# Patient Record
Sex: FEMALE | Race: WHITE | NOT HISPANIC OR LATINO | Employment: OTHER | URBAN - METROPOLITAN AREA
[De-identification: names, ages, dates, MRNs, and addresses within clinical notes are randomized per-mention and may not be internally consistent; named-entity substitution may affect disease eponyms.]

---

## 2017-01-10 ENCOUNTER — TRANSCRIBE ORDERS (OUTPATIENT)
Dept: ADMINISTRATIVE | Facility: HOSPITAL | Age: 62
End: 2017-01-10

## 2017-01-12 ENCOUNTER — TRANSCRIBE ORDERS (OUTPATIENT)
Dept: ADMINISTRATIVE | Facility: HOSPITAL | Age: 62
End: 2017-01-12

## 2017-01-12 ENCOUNTER — APPOINTMENT (OUTPATIENT)
Dept: LAB | Facility: MEDICAL CENTER | Age: 62
End: 2017-01-12
Payer: COMMERCIAL

## 2017-01-12 DIAGNOSIS — E78.49 OTHER HYPERLIPIDEMIA: Primary | ICD-10-CM

## 2017-01-12 DIAGNOSIS — E78.49 OTHER HYPERLIPIDEMIA: ICD-10-CM

## 2017-01-12 LAB
CHOLEST SERPL-MCNC: 198 MG/DL (ref 50–200)
HDLC SERPL-MCNC: 50 MG/DL (ref 40–60)
LDLC SERPL CALC-MCNC: 130 MG/DL (ref 0–100)
TRIGL SERPL-MCNC: 92 MG/DL

## 2017-01-12 PROCEDURE — 36415 COLL VENOUS BLD VENIPUNCTURE: CPT

## 2017-01-12 PROCEDURE — 80061 LIPID PANEL: CPT

## 2017-03-28 ENCOUNTER — APPOINTMENT (OUTPATIENT)
Dept: LAB | Facility: MEDICAL CENTER | Age: 62
End: 2017-03-28
Payer: COMMERCIAL

## 2017-03-28 DIAGNOSIS — E03.9 HYPOTHYROIDISM: ICD-10-CM

## 2017-03-28 DIAGNOSIS — C50.912 MALIGNANT NEOPLASM OF LEFT FEMALE BREAST (HCC): ICD-10-CM

## 2017-03-28 LAB
ALBUMIN SERPL BCP-MCNC: 3.9 G/DL (ref 3.5–5)
ALP SERPL-CCNC: 100 U/L (ref 46–116)
ALT SERPL W P-5'-P-CCNC: 28 U/L (ref 12–78)
ANION GAP SERPL CALCULATED.3IONS-SCNC: 5 MMOL/L (ref 4–13)
AST SERPL W P-5'-P-CCNC: 19 U/L (ref 5–45)
BASOPHILS # BLD AUTO: 0.06 THOUSANDS/ΜL (ref 0–0.1)
BASOPHILS NFR BLD AUTO: 1 % (ref 0–1)
BILIRUB SERPL-MCNC: 0.45 MG/DL (ref 0.2–1)
BUN SERPL-MCNC: 15 MG/DL (ref 5–25)
CALCIUM SERPL-MCNC: 9.1 MG/DL (ref 8.3–10.1)
CANCER AG27-29 SERPL-ACNC: 11.1 U/ML (ref 0–42.3)
CHLORIDE SERPL-SCNC: 103 MMOL/L (ref 100–108)
CO2 SERPL-SCNC: 29 MMOL/L (ref 21–32)
CREAT SERPL-MCNC: 0.81 MG/DL (ref 0.6–1.3)
EOSINOPHIL # BLD AUTO: 0.25 THOUSAND/ΜL (ref 0–0.61)
EOSINOPHIL NFR BLD AUTO: 2 % (ref 0–6)
ERYTHROCYTE [DISTWIDTH] IN BLOOD BY AUTOMATED COUNT: 14.7 % (ref 11.6–15.1)
GFR SERPL CREATININE-BSD FRML MDRD: >60 ML/MIN/1.73SQ M
GLUCOSE P FAST SERPL-MCNC: 93 MG/DL (ref 65–99)
HCT VFR BLD AUTO: 41.1 % (ref 34.8–46.1)
HGB BLD-MCNC: 13.6 G/DL (ref 11.5–15.4)
LYMPHOCYTES # BLD AUTO: 3.67 THOUSANDS/ΜL (ref 0.6–4.47)
LYMPHOCYTES NFR BLD AUTO: 36 % (ref 14–44)
MCH RBC QN AUTO: 31.6 PG (ref 26.8–34.3)
MCHC RBC AUTO-ENTMCNC: 33.1 G/DL (ref 31.4–37.4)
MCV RBC AUTO: 95 FL (ref 82–98)
MONOCYTES # BLD AUTO: 1.11 THOUSAND/ΜL (ref 0.17–1.22)
MONOCYTES NFR BLD AUTO: 11 % (ref 4–12)
NEUTROPHILS # BLD AUTO: 5.19 THOUSANDS/ΜL (ref 1.85–7.62)
NEUTS SEG NFR BLD AUTO: 50 % (ref 43–75)
NRBC BLD AUTO-RTO: 0 /100 WBCS
PLATELET # BLD AUTO: 393 THOUSANDS/UL (ref 149–390)
PMV BLD AUTO: 10.7 FL (ref 8.9–12.7)
POTASSIUM SERPL-SCNC: 4.7 MMOL/L (ref 3.5–5.3)
PROT SERPL-MCNC: 7.4 G/DL (ref 6.4–8.2)
RBC # BLD AUTO: 4.31 MILLION/UL (ref 3.81–5.12)
SODIUM SERPL-SCNC: 137 MMOL/L (ref 136–145)
WBC # BLD AUTO: 10.33 THOUSAND/UL (ref 4.31–10.16)

## 2017-03-28 PROCEDURE — 85025 COMPLETE CBC W/AUTO DIFF WBC: CPT

## 2017-03-28 PROCEDURE — 80053 COMPREHEN METABOLIC PANEL: CPT

## 2017-03-28 PROCEDURE — 36415 COLL VENOUS BLD VENIPUNCTURE: CPT

## 2017-03-28 PROCEDURE — 86300 IMMUNOASSAY TUMOR CA 15-3: CPT

## 2017-04-05 ENCOUNTER — ALLSCRIPTS OFFICE VISIT (OUTPATIENT)
Dept: OTHER | Facility: OTHER | Age: 62
End: 2017-04-05

## 2017-04-13 ENCOUNTER — ALLSCRIPTS OFFICE VISIT (OUTPATIENT)
Dept: OTHER | Facility: OTHER | Age: 62
End: 2017-04-13

## 2017-04-18 ENCOUNTER — LAB CONVERSION - ENCOUNTER (OUTPATIENT)
Dept: OTHER | Facility: OTHER | Age: 62
End: 2017-04-18

## 2017-04-18 LAB
ADDITIONAL INFORMATION (HISTORICAL): NORMAL
ADEQUACY: (HISTORICAL): NORMAL
COMMENT (HISTORICAL): NORMAL
CYTOTECHNOLOGIST: (HISTORICAL): NORMAL
INTERPRETATION (HISTORICAL): NORMAL
LMP (HISTORICAL): NORMAL
PREV. BX: (HISTORICAL): NORMAL
PREV. PAP (HISTORICAL): NORMAL
SOURCE (HISTORICAL): NORMAL

## 2017-07-20 ENCOUNTER — APPOINTMENT (OUTPATIENT)
Dept: LAB | Facility: MEDICAL CENTER | Age: 62
End: 2017-07-20
Payer: COMMERCIAL

## 2017-07-20 ENCOUNTER — TRANSCRIBE ORDERS (OUTPATIENT)
Dept: ADMINISTRATIVE | Facility: HOSPITAL | Age: 62
End: 2017-07-20

## 2017-07-20 DIAGNOSIS — I10 ESSENTIAL HYPERTENSION, MALIGNANT: ICD-10-CM

## 2017-07-20 DIAGNOSIS — E03.9 UNSPECIFIED HYPOTHYROIDISM: ICD-10-CM

## 2017-07-20 DIAGNOSIS — E78.5 OTHER AND UNSPECIFIED HYPERLIPIDEMIA: ICD-10-CM

## 2017-07-20 DIAGNOSIS — I10 ESSENTIAL HYPERTENSION, MALIGNANT: Primary | ICD-10-CM

## 2017-07-20 LAB
ALBUMIN SERPL BCP-MCNC: 4 G/DL (ref 3.5–5)
ALP SERPL-CCNC: 92 U/L (ref 46–116)
ALT SERPL W P-5'-P-CCNC: 29 U/L (ref 12–78)
ANION GAP SERPL CALCULATED.3IONS-SCNC: 6 MMOL/L (ref 4–13)
AST SERPL W P-5'-P-CCNC: 20 U/L (ref 5–45)
BASOPHILS # BLD AUTO: 0.06 THOUSANDS/ΜL (ref 0–0.1)
BASOPHILS NFR BLD AUTO: 1 % (ref 0–1)
BILIRUB SERPL-MCNC: 0.44 MG/DL (ref 0.2–1)
BUN SERPL-MCNC: 17 MG/DL (ref 5–25)
CALCIUM SERPL-MCNC: 9.2 MG/DL (ref 8.3–10.1)
CHLORIDE SERPL-SCNC: 103 MMOL/L (ref 100–108)
CHOLEST SERPL-MCNC: 202 MG/DL (ref 50–200)
CO2 SERPL-SCNC: 29 MMOL/L (ref 21–32)
CREAT SERPL-MCNC: 0.82 MG/DL (ref 0.6–1.3)
EOSINOPHIL # BLD AUTO: 0.34 THOUSAND/ΜL (ref 0–0.61)
EOSINOPHIL NFR BLD AUTO: 4 % (ref 0–6)
ERYTHROCYTE [DISTWIDTH] IN BLOOD BY AUTOMATED COUNT: 14.4 % (ref 11.6–15.1)
GFR SERPL CREATININE-BSD FRML MDRD: >60 ML/MIN/1.73SQ M
GLUCOSE P FAST SERPL-MCNC: 90 MG/DL (ref 65–99)
HCT VFR BLD AUTO: 42.9 % (ref 34.8–46.1)
HDLC SERPL-MCNC: 35 MG/DL (ref 40–60)
HGB BLD-MCNC: 14 G/DL (ref 11.5–15.4)
LDLC SERPL CALC-MCNC: 135 MG/DL (ref 0–100)
LYMPHOCYTES # BLD AUTO: 3.82 THOUSANDS/ΜL (ref 0.6–4.47)
LYMPHOCYTES NFR BLD AUTO: 40 % (ref 14–44)
MCH RBC QN AUTO: 31.3 PG (ref 26.8–34.3)
MCHC RBC AUTO-ENTMCNC: 32.6 G/DL (ref 31.4–37.4)
MCV RBC AUTO: 96 FL (ref 82–98)
MONOCYTES # BLD AUTO: 0.9 THOUSAND/ΜL (ref 0.17–1.22)
MONOCYTES NFR BLD AUTO: 9 % (ref 4–12)
NEUTROPHILS # BLD AUTO: 4.46 THOUSANDS/ΜL (ref 1.85–7.62)
NEUTS SEG NFR BLD AUTO: 46 % (ref 43–75)
NRBC BLD AUTO-RTO: 0 /100 WBCS
PLATELET # BLD AUTO: 426 THOUSANDS/UL (ref 149–390)
PMV BLD AUTO: 10.6 FL (ref 8.9–12.7)
POTASSIUM SERPL-SCNC: 4.1 MMOL/L (ref 3.5–5.3)
PROT SERPL-MCNC: 7.5 G/DL (ref 6.4–8.2)
RBC # BLD AUTO: 4.47 MILLION/UL (ref 3.81–5.12)
SODIUM SERPL-SCNC: 138 MMOL/L (ref 136–145)
T4 FREE SERPL-MCNC: 1.36 NG/DL (ref 0.76–1.46)
TRIGL SERPL-MCNC: 159 MG/DL
TSH SERPL DL<=0.05 MIU/L-ACNC: 1.66 UIU/ML (ref 0.36–3.74)
WBC # BLD AUTO: 9.61 THOUSAND/UL (ref 4.31–10.16)

## 2017-07-20 PROCEDURE — 80061 LIPID PANEL: CPT

## 2017-07-20 PROCEDURE — 36415 COLL VENOUS BLD VENIPUNCTURE: CPT

## 2017-07-20 PROCEDURE — 84443 ASSAY THYROID STIM HORMONE: CPT

## 2017-07-20 PROCEDURE — 84439 ASSAY OF FREE THYROXINE: CPT

## 2017-07-20 PROCEDURE — 80053 COMPREHEN METABOLIC PANEL: CPT

## 2017-07-20 PROCEDURE — 85025 COMPLETE CBC W/AUTO DIFF WBC: CPT

## 2017-09-25 DIAGNOSIS — C50.912 MALIGNANT NEOPLASM OF LEFT FEMALE BREAST (HCC): ICD-10-CM

## 2017-09-26 ENCOUNTER — TRANSCRIBE ORDERS (OUTPATIENT)
Dept: ADMINISTRATIVE | Facility: HOSPITAL | Age: 62
End: 2017-09-26

## 2017-09-26 ENCOUNTER — APPOINTMENT (OUTPATIENT)
Dept: LAB | Facility: MEDICAL CENTER | Age: 62
End: 2017-09-26
Payer: COMMERCIAL

## 2017-09-26 DIAGNOSIS — C50.912 MALIGNANT NEOPLASM OF LEFT FEMALE BREAST (HCC): ICD-10-CM

## 2017-09-26 LAB
ALBUMIN SERPL BCP-MCNC: 3.8 G/DL (ref 3.5–5)
ALP SERPL-CCNC: 91 U/L (ref 46–116)
ALT SERPL W P-5'-P-CCNC: 30 U/L (ref 12–78)
ANION GAP SERPL CALCULATED.3IONS-SCNC: 7 MMOL/L (ref 4–13)
AST SERPL W P-5'-P-CCNC: 20 U/L (ref 5–45)
BASOPHILS # BLD AUTO: 0.04 THOUSANDS/ΜL (ref 0–0.1)
BASOPHILS NFR BLD AUTO: 0 % (ref 0–1)
BILIRUB SERPL-MCNC: 0.52 MG/DL (ref 0.2–1)
BUN SERPL-MCNC: 16 MG/DL (ref 5–25)
CALCIUM SERPL-MCNC: 9.4 MG/DL (ref 8.3–10.1)
CANCER AG27-29 SERPL-ACNC: 11.4 U/ML (ref 0–42.3)
CHLORIDE SERPL-SCNC: 103 MMOL/L (ref 100–108)
CO2 SERPL-SCNC: 28 MMOL/L (ref 21–32)
CREAT SERPL-MCNC: 0.81 MG/DL (ref 0.6–1.3)
EOSINOPHIL # BLD AUTO: 0.23 THOUSAND/ΜL (ref 0–0.61)
EOSINOPHIL NFR BLD AUTO: 2 % (ref 0–6)
ERYTHROCYTE [DISTWIDTH] IN BLOOD BY AUTOMATED COUNT: 14.6 % (ref 11.6–15.1)
GFR SERPL CREATININE-BSD FRML MDRD: 79 ML/MIN/1.73SQ M
GLUCOSE P FAST SERPL-MCNC: 116 MG/DL (ref 65–99)
HCT VFR BLD AUTO: 41.2 % (ref 34.8–46.1)
HGB BLD-MCNC: 13.8 G/DL (ref 11.5–15.4)
LYMPHOCYTES # BLD AUTO: 3.19 THOUSANDS/ΜL (ref 0.6–4.47)
LYMPHOCYTES NFR BLD AUTO: 32 % (ref 14–44)
MCH RBC QN AUTO: 31.7 PG (ref 26.8–34.3)
MCHC RBC AUTO-ENTMCNC: 33.5 G/DL (ref 31.4–37.4)
MCV RBC AUTO: 95 FL (ref 82–98)
MONOCYTES # BLD AUTO: 0.92 THOUSAND/ΜL (ref 0.17–1.22)
MONOCYTES NFR BLD AUTO: 9 % (ref 4–12)
NEUTROPHILS # BLD AUTO: 5.66 THOUSANDS/ΜL (ref 1.85–7.62)
NEUTS SEG NFR BLD AUTO: 57 % (ref 43–75)
NRBC BLD AUTO-RTO: 0 /100 WBCS
PLATELET # BLD AUTO: 408 THOUSANDS/UL (ref 149–390)
PMV BLD AUTO: 10.8 FL (ref 8.9–12.7)
POTASSIUM SERPL-SCNC: 4.6 MMOL/L (ref 3.5–5.3)
PROT SERPL-MCNC: 7.7 G/DL (ref 6.4–8.2)
RBC # BLD AUTO: 4.36 MILLION/UL (ref 3.81–5.12)
SODIUM SERPL-SCNC: 138 MMOL/L (ref 136–145)
WBC # BLD AUTO: 10.06 THOUSAND/UL (ref 4.31–10.16)

## 2017-09-26 PROCEDURE — 85025 COMPLETE CBC W/AUTO DIFF WBC: CPT

## 2017-09-26 PROCEDURE — 86300 IMMUNOASSAY TUMOR CA 15-3: CPT

## 2017-09-26 PROCEDURE — 80053 COMPREHEN METABOLIC PANEL: CPT

## 2017-09-26 PROCEDURE — 36415 COLL VENOUS BLD VENIPUNCTURE: CPT

## 2017-10-04 ENCOUNTER — ALLSCRIPTS OFFICE VISIT (OUTPATIENT)
Dept: OTHER | Facility: OTHER | Age: 62
End: 2017-10-04

## 2017-10-06 NOTE — PROGRESS NOTES
Assessment  1  Breast cancer, left breast (174 9) (C50 912)    Plan  Breast cancer, left breast    · (1) CA 27 29; Status:Active; Requested for:26Mar2018; Perform:[facility]; IVT:[id number]; For:Breast cancer, left breast; Ordered By:Russell Camacho;   · (1) CBC/PLT/DIFF; Status:Active; Requested for:26Mar2018; Perform:[facility]; FRO:[id number]; For:Breast cancer, left breast; Ordered By:Russell Camacho;   · (1) COMPREHENSIVE METABOLIC PANEL; Status:Active; Requested for:26Mar2018; Perform:[facility]; LYR:[id number]; For:Breast cancer, left breast; Ordered By:Horacio Camacho;   · Follow-up visit in 6 months Evaluation and Treatment  Follow-up  Status: Complete   Done: [id number] 08:40AM   Ordered; For: Breast cancer, left breast; Ordered By: Eh Tamayo Performed:  Due: 08Apr2018; Last Updated By: Valeri Moeller; 10/4/2017 9:31:16 AM    Discussion/Summary  Discussion Summary:   Kris Davis remains in clinical remission of infiltrating ductal carcinoma of left breast, pTicpN0, hormone receptor negative, HER-2/alexia negative, diagnosed November 2013  patient aware to seek medical attention for lymph node enlargement, progressive joint pain of the knees or elsewhere, excessive fatigue, or if other new problems arise  Otherwise, I plan to see her again in 6 months  Chief Complaint  Chief Complaint Free Text Note Form: Kris Davis was seen in followup today in regards to infiltrating ductal carcinoma of left breast, pTic pN0, hormone receptor negative, HER-2/alexia negative, diagnosed November 2013  History of Present Illness  HPI: She has been feeling well  Previous Therapy:   1   Infiltrating ductal carcinoma, grade 3, 1 2 cm, ER negative, CT negative, HER-2/alexia negative and 0 7 cm, ER negative, CT negative, HER-2 negative, 3 sentinel lymph nodes with no metastatic tumor identified, pT1c,pN0, left total mastectomy and sentinel lymph node sampling  Dose dense Adriamycin and Cytoxan for 4 courses and then dose dense Taxol four courses from 2014 to 2014  Left breast implant reconstruction 2014 and again in 2014 and then TRAM flap reconstruction 2015  Leukocytosis with thrombocytosis was diagnosed in   Bone marrow examination in 1999 was minimally hypercellular with increased megakaryocytes and there was no JAK2 V617F mutation noted in 2008 at which time flow cytometry on peripheral blood demonstrated presence of granulocytes, lymphocytes, and monocytes without diagnostic changes of leukemia or lymphoma  Interval History: medical history: Hypothyroidism since age 28, arthritis of the knees bilaterally left greater than right, uterine fibroid was removed approximately 15 years ago, wisdom teeth extractions in her early 35s and tubal ligation  Past medical history: Leukocytosis with thrombocytosis was diagnosed in   Colonoscopy was done in 2010 and was unremarkable  history: Father  at age 59 with multiple myeloma  Mother  at age 76 with myocardial infarction  A brother and a sister are in good health and one other sister age 54 has diabetes mellitus and has undergone CABG  Both of her two daughters are in good health  history: She is  and lives with her   She was a cigarette smoker of a half-a-pack per day for 10 years in her 25s  She has 1-2 glasses of wine daily  She works in a bank  She gets regular exercise walking on the treadmill and walks outdoors almost daily and exercises with light weights daily  Review of Systems  Complete-Female:   Constitutional: She has been feeling well  ENT: no nosebleeds  Cardiovascular: no chest pain-and-no lower extremity edema  Respiratory: no shortness of breath-and-no cough  Gastrointestinal: no abdominal pain,-no constipation-and-no diarrhea     Musculoskeletal: There is chronic arthritis of the knees, but-as noted in HPI  Neurological: no headache-and-no difficulty walking  Psychiatric: no emotional problems  Hematologic/Lymphatic: no tendency for easy bruising  Active Problems  1  Abnormal findings on diagnostic imaging of breast (793 89) (R92 8)   2  Alopecia totalis (704 09) (L63 0)   3  Arthritis of both knees (716 96) (M17 0)   4  Breast cancer, left breast (174 9) (C50 912)   5  Carcinoma in situ of breast (233 0) (D05 90)   6  Elevated alkaline phosphatase level (790 5) (R74 8)   7  Encounter for routine gynecological examination (V72 31) (Z01 419)   8  Encounter for routine gynecological examination with Papanicolaou smear of cervix   (V7 31,V76 2) (Z01 419)   9  Encounter for screening mammogram for malignant neoplasm of breast (V76 12)   (Z12 31)   10  Herpes zoster (053 9) (B02 9)   11  History of allergy (V15 09) (Z88 9)   12  Hypothyroidism (244 9) (E03 9)   13  Lymphadenopathy, mediastinal (785 6) (R59 0)   14  Malignant neoplasm of breast, unspecified laterality   15  Menopausal symptoms (627 2) (N95 1)   16  Menopause (627 2)    Past Medical History  1  History of Breast Cancer (V10 3)   2  History of Summary Of Previous Pregnancies  2  (Total No )   3  History of Summary Of Previous Pregnancies Para 2  (Deliveries)    Surgical History  1  History of Breast Surgery Reconstruction   2  History of Radical Mastectomy Left Breast   3  History of Tubal Ligation    Family History  Father    1  Family history of Diabetes Mellitus (V18 0)   2  Family history of Hypertension (V17 49)  Sister    3  Family history of Diabetes Mellitus (V18 0)  Family History    4  Family history of Acute Myocardial Infarction (V17 3)   5  Family history of Bone Cancer   6  Family history of Hypothyroidism    Social History   · Former smoker (V15 82) (W30 170)   · Marital History - Currently    · Never A Smoker   · Sexually Active    Current Meds   1  Calcium TABS;    Therapy: (Recorded:2014) to Recorded   2  [employer] Fish Oil CAPS Recorded   3  Lisinopril 20 MG Oral Tablet Recorded   4  Multi-Day Vitamins TABS; Therapy: (Recorded:18Ykt8317) to Recorded   5  Synthroid 125 MCG Oral Tablet Recorded   6  Vitamin D CAPS; Therapy: (Recorded:04Apr2014) to Recorded   7  ZyrTEC Allergy 10 MG Oral Tablet; Therapy: (Recorded:01Yag4145) to Recorded    Allergies  1  Clindamycin HCl CAPS   2  Penicillins   3  Sulfa Drugs  4  Adhesive Tape    Vitals  Vital Signs    Recorded: [id number] 09:01AM   Temperature 97 4 F   Heart Rate 78   Respiration 16   Systolic 434   Diastolic 70   Height 5 ft 2 3 in   Weight 199 lb    BMI Calculated 36 05   BSA Calculated 1 91   O2 Saturation 97   Pain Scale 0     Physical Exam    Constitutional She appears well  Eyes EOMI  Ears, Nose, Mouth, and Throat Oropharynx is clear  Pulmonary   Auscultation of lungs: Clear to auscultation  Cardiovascular Heart has regular rate and rhythm -No lower extremity edema  Abdomen   Abdomen: Non-tender, no masses  Liver and spleen: No hepatomegaly or splenomegaly  Lymphatic   Palpation of lymph nodes in neck: No lymphadenopathy  -No axillary or inguinal lymphadenopathy  Musculoskeletal   Gait and station: Normal     Neurologic Neurological is grossly intact  Psychiatric   Orientation to person, place, and time: Normal     Mood and affect: Normal     Additional Exam:  No right breast mass, no recurrent tumor over the left chest reconstruction site  ECOG 0       Results/Data  (1) CBC/PLT/DIFF [id number] 09:10AM Jacquelyn Soler Order Number: [id number]     Test Name Result Flag Reference   WBC COUNT 10 06 Thousand/uL  4 31-10 16   RBC COUNT 4 36 Million/uL  3 81-5 12   HEMOGLOBIN 13 8 g/dL  11 5-15 4   HEMATOCRIT 41 2 %  34 8-46  1   MCV 95 fL  82-98   MCH 31 7 pg  26 8-34 3   MCHC 33 5 g/dL  31 4-37 4   RDW 14 6 %  11 6-15 1   MPV 10 8 fL  8 9-12 7   PLATELET COUNT 818 Thousands/uL H 149-390   nRBC AUTOMATED 0 /100 WBCs     NEUTROPHILS RELATIVE PERCENT 57 %  43-75   LYMPHOCYTES RELATIVE PERCENT 32 %  14-44   MONOCYTES RELATIVE PERCENT 9 %  4-12   EOSINOPHILS RELATIVE PERCENT 2 %  0-6   BASOPHILS RELATIVE PERCENT 0 %  0-1   NEUTROPHILS ABSOLUTE COUNT 5 66 Thousands/? ??L  1 85-7 62   LYMPHOCYTES ABSOLUTE COUNT 3 19 Thousands/? ??L  0 60-4 47   MONOCYTES ABSOLUTE COUNT 0 92 Thousand/? ??L  0 17-1 22   EOSINOPHILS ABSOLUTE COUNT 0 23 Thousand/? ??L  0 00-0 61   BASOPHILS ABSOLUTE COUNT 0 04 Thousands/? ??L  0 00-0 10   - Patient Instructions: This bloodwork is non-fasting  Please drink two glasses of water morning of bloodwork  (1) COMPREHENSIVE METABOLIC PANEL 14YUD1038 75:16OS Stacie Romo Order Number: [id number]     Test Name Result Flag Reference   SODIUM 138 mmol/L  136-145   POTASSIUM 4 6 mmol/L  3 5-5 3   CHLORIDE 103 mmol/L  100-108   CARBON DIOXIDE 28 mmol/L  21-32   ANION GAP (CALC) 7 mmol/L  4-13   BLOOD UREA NITROGEN 16 mg/dL  5-25   CREATININE 0 81 mg/dL  0 60-1 30   Standardized to IDMS reference method   CALCIUM 9 4 mg/dL  8 3-10 1   BILI, TOTAL 0 52 mg/dL  0 20-1 00   ALK PHOSPHATAS 91 U/L     ALT (SGPT) 30 U/L  12-78   Specimen collection should occur prior to Sulfasalazine and/or Sulfapyridine administration due to the potential for falsely depressed results  AST(SGOT) 20 U/L  5-45   Specimen collection should occur prior to Sulfasalazine administration due to the potential for falsely depressed results  ALBUMIN 3 8 g/dL  3 5-5 0   TOTAL PROTEIN 7 7 g/dL  6 4-8 2   eGFR 79 ml/min/1 73sq m     [facility] [employer] Disease Education Program recommendations are as follows:  GFR calculation is accurate only with a steady state creatinine  Chronic Kidney disease less than 60 ml/min/1 73 sq  meters  Kidney failure less than 15 ml/min/1 73 sq  meters     GLUCOSE FASTING 116 mg/dL H 65-99   Specimen collection should occur prior to Sulfasalazine administration due to the potential for falsely depressed results  Specimen collection should occur prior to Sulfapyridine administration due to the potential for falsely elevated results  (1) CA 27 29 [id number] 09:10AM Johanna Comber Order Number: [id number]     Test Name Result Flag Reference   CA 27 29(NEW) 11 4 U/mL  0 0-42 3     Results Form:   Results   Radiology Bone density of the heel from April 13, 2017: +0 3 RF  Lab Results From September 26, 2016: CA 27 29 is 20 4,March 28, 2017: CA 27 29 is11  1  September 26, 2017: CA 27 29 is 11 4, WBC 10 06, hemoglobin 13 8, platelets 508, creatinine 0 81, bili 0 52, AST 20, ALT 30, alk-phos 91  Future Appointments    Date/Time Provider Specialty Site   04/03/2018 08:40 AM AMINA Benitez   Hematology Oncology [facility]     Signatures   Electronically signed by : AMINA Mathias ; Oct  5 2017  9:48PM EST                       (Author)

## 2018-01-13 VITALS
HEIGHT: 62 IN | SYSTOLIC BLOOD PRESSURE: 102 MMHG | RESPIRATION RATE: 16 BRPM | BODY MASS INDEX: 36.62 KG/M2 | OXYGEN SATURATION: 97 % | WEIGHT: 199 LBS | TEMPERATURE: 97.4 F | DIASTOLIC BLOOD PRESSURE: 70 MMHG | HEART RATE: 78 BPM

## 2018-01-13 VITALS
WEIGHT: 200 LBS | HEART RATE: 78 BPM | RESPIRATION RATE: 16 BRPM | HEIGHT: 62 IN | BODY MASS INDEX: 36.8 KG/M2 | TEMPERATURE: 98.2 F | SYSTOLIC BLOOD PRESSURE: 150 MMHG | OXYGEN SATURATION: 97 % | DIASTOLIC BLOOD PRESSURE: 84 MMHG

## 2018-01-15 VITALS
HEIGHT: 62 IN | DIASTOLIC BLOOD PRESSURE: 94 MMHG | SYSTOLIC BLOOD PRESSURE: 144 MMHG | BODY MASS INDEX: 36.8 KG/M2 | WEIGHT: 200 LBS

## 2018-03-26 ENCOUNTER — TRANSCRIBE ORDERS (OUTPATIENT)
Dept: ADMINISTRATIVE | Facility: HOSPITAL | Age: 63
End: 2018-03-26

## 2018-03-26 ENCOUNTER — APPOINTMENT (OUTPATIENT)
Dept: LAB | Facility: MEDICAL CENTER | Age: 63
End: 2018-03-26
Payer: COMMERCIAL

## 2018-03-26 DIAGNOSIS — C50.912 MALIGNANT NEOPLASM OF LEFT FEMALE BREAST (HCC): ICD-10-CM

## 2018-03-26 LAB
ALBUMIN SERPL BCP-MCNC: 4.4 G/DL (ref 3.5–5)
ALP SERPL-CCNC: 106 U/L (ref 46–116)
ALT SERPL W P-5'-P-CCNC: 28 U/L (ref 12–78)
ANION GAP SERPL CALCULATED.3IONS-SCNC: 5 MMOL/L (ref 4–13)
AST SERPL W P-5'-P-CCNC: 18 U/L (ref 5–45)
BASOPHILS # BLD AUTO: 0.05 THOUSANDS/ΜL (ref 0–0.1)
BASOPHILS NFR BLD AUTO: 1 % (ref 0–1)
BILIRUB SERPL-MCNC: 0.65 MG/DL (ref 0.2–1)
BUN SERPL-MCNC: 15 MG/DL (ref 5–25)
CALCIUM SERPL-MCNC: 9.5 MG/DL (ref 8.3–10.1)
CANCER AG27-29 SERPL-ACNC: 14.9 U/ML (ref 0–42.3)
CHLORIDE SERPL-SCNC: 102 MMOL/L (ref 100–108)
CO2 SERPL-SCNC: 29 MMOL/L (ref 21–32)
CREAT SERPL-MCNC: 0.93 MG/DL (ref 0.6–1.3)
EOSINOPHIL # BLD AUTO: 0.33 THOUSAND/ΜL (ref 0–0.61)
EOSINOPHIL NFR BLD AUTO: 3 % (ref 0–6)
ERYTHROCYTE [DISTWIDTH] IN BLOOD BY AUTOMATED COUNT: 14 % (ref 11.6–15.1)
GFR SERPL CREATININE-BSD FRML MDRD: 67 ML/MIN/1.73SQ M
GLUCOSE P FAST SERPL-MCNC: 93 MG/DL (ref 65–99)
HCT VFR BLD AUTO: 42.4 % (ref 34.8–46.1)
HGB BLD-MCNC: 14.1 G/DL (ref 11.5–15.4)
LYMPHOCYTES # BLD AUTO: 3.77 THOUSANDS/ΜL (ref 0.6–4.47)
LYMPHOCYTES NFR BLD AUTO: 37 % (ref 14–44)
MCH RBC QN AUTO: 31.7 PG (ref 26.8–34.3)
MCHC RBC AUTO-ENTMCNC: 33.3 G/DL (ref 31.4–37.4)
MCV RBC AUTO: 95 FL (ref 82–98)
MONOCYTES # BLD AUTO: 1.01 THOUSAND/ΜL (ref 0.17–1.22)
MONOCYTES NFR BLD AUTO: 10 % (ref 4–12)
NEUTROPHILS # BLD AUTO: 5.03 THOUSANDS/ΜL (ref 1.85–7.62)
NEUTS SEG NFR BLD AUTO: 49 % (ref 43–75)
NRBC BLD AUTO-RTO: 0 /100 WBCS
PLATELET # BLD AUTO: 434 THOUSANDS/UL (ref 149–390)
PMV BLD AUTO: 10.5 FL (ref 8.9–12.7)
POTASSIUM SERPL-SCNC: 4.2 MMOL/L (ref 3.5–5.3)
PROT SERPL-MCNC: 7.9 G/DL (ref 6.4–8.2)
RBC # BLD AUTO: 4.45 MILLION/UL (ref 3.81–5.12)
SODIUM SERPL-SCNC: 136 MMOL/L (ref 136–145)
WBC # BLD AUTO: 10.21 THOUSAND/UL (ref 4.31–10.16)

## 2018-03-26 PROCEDURE — 85025 COMPLETE CBC W/AUTO DIFF WBC: CPT

## 2018-03-26 PROCEDURE — 86300 IMMUNOASSAY TUMOR CA 15-3: CPT

## 2018-03-26 PROCEDURE — 36415 COLL VENOUS BLD VENIPUNCTURE: CPT

## 2018-03-26 PROCEDURE — 80053 COMPREHEN METABOLIC PANEL: CPT

## 2018-04-03 ENCOUNTER — OFFICE VISIT (OUTPATIENT)
Dept: HEMATOLOGY ONCOLOGY | Facility: CLINIC | Age: 63
End: 2018-04-03
Payer: COMMERCIAL

## 2018-04-03 VITALS
BODY MASS INDEX: 36.99 KG/M2 | OXYGEN SATURATION: 97 % | HEIGHT: 62 IN | HEART RATE: 76 BPM | TEMPERATURE: 97.7 F | SYSTOLIC BLOOD PRESSURE: 150 MMHG | WEIGHT: 201 LBS | DIASTOLIC BLOOD PRESSURE: 100 MMHG

## 2018-04-03 DIAGNOSIS — C50.912 MALIGNANT NEOPLASM OF LEFT BREAST IN FEMALE, ESTROGEN RECEPTOR NEGATIVE, UNSPECIFIED SITE OF BREAST (HCC): Primary | ICD-10-CM

## 2018-04-03 DIAGNOSIS — Z17.1 MALIGNANT NEOPLASM OF LEFT BREAST IN FEMALE, ESTROGEN RECEPTOR NEGATIVE, UNSPECIFIED SITE OF BREAST (HCC): Primary | ICD-10-CM

## 2018-04-03 DIAGNOSIS — D72.829 LEUKOCYTOSIS, UNSPECIFIED TYPE: ICD-10-CM

## 2018-04-03 PROCEDURE — 99214 OFFICE O/P EST MOD 30 MIN: CPT | Performed by: INTERNAL MEDICINE

## 2018-04-03 RX ORDER — MULTIVIT-MIN/IRON/FOLIC ACID/K 18-600-40
CAPSULE ORAL
COMMUNITY
End: 2018-10-30 | Stop reason: CLARIF

## 2018-04-03 RX ORDER — LISINOPRIL 20 MG/1
TABLET ORAL
COMMUNITY
Start: 2018-03-30 | End: 2018-10-01 | Stop reason: SDUPTHER

## 2018-04-03 RX ORDER — CHLORHEXIDINE/GLYCERIN/HE-CELL
JELLY (GRAM) TOPICAL
COMMUNITY
End: 2021-09-24

## 2018-04-03 RX ORDER — MULTIVITAMIN
TABLET ORAL
COMMUNITY

## 2018-04-03 RX ORDER — LEVOTHYROXINE SODIUM 0.12 MG/1
125 TABLET ORAL DAILY
Refills: 1 | COMMUNITY
Start: 2018-02-22 | End: 2019-05-20 | Stop reason: SDUPTHER

## 2018-04-03 NOTE — PROGRESS NOTES
April 3, 2018    Pratik Abreu    She has been feeling well  She has been following up with Dr Dahlia Armstrong her breast surgeon every 6 months  ROS:  She has chronic arthritis of the knees  Hematology/Oncology History:    1  Infiltrating ductal carcinoma, grade 3, 1 2 cm, ER negative, MS negative, HER-2/alexia negative and 0 7 cm, ER negative, MS negative, HER-2 negative, 3 sentinel lymph nodes with no metastatic tumor identified, pT1c,pN0, left total mastectomy and sentinel lymph node sampling  Dose dense Adriamycin and Cytoxan for 4 courses and then dose dense Taxol four courses from March 2014 to June 2014  Left breast implant reconstruction August 2014 and again in October 2014 and then TRAM flap reconstruction February 2015     2  Leukocytosis, mild, and thrombocytosis, mild, was diagnosed in 1998  Bone marrow examination in January 1999 was minimally hypercellular with increased megakaryocytes and JAK2 V617F mutation not detected in April 2008 at which time flow cytometry on peripheral blood demonstrated presence of granulocytes, lymphocytes, and monocytes without diagnostic changes of leukemia or lymphoma  Physical Examination:    Blood pressure 150/100, pulse 76, temperature 97 7 °F (36 5 °C), temperature source Tympanic, height 5' 2 3" (1 582 m), weight 91 2 kg (201 lb), SpO2 97 %  Body surface area is 1 92 meters squared  She appears well  EOMI  Oropharynx clear  No lymphadenopathy of the neck or axilla  No right breast mass, no recurrent tumor over the left chest reconstruction site  Lungs are clear bilaterally  Heart has regular rate and rhythm  No hepatic or splenic enlargement  No inguinal lymphadenopathy  No lower extremity edema  Normal gait and station  Neurological is grossly intact  Oriented x3, normal mood and affect      ECOG 0    Laboratory:    From March 26, 2018:  CA 27 29 is 14 9, WBC 10 21 (4 31-10 16) with ANC 5 03 (1 85-7 62) and absolute lymphocyte count 3 77 (0  60-4 77), hemoglobin 14 1, platelets 865 (997-557), creatinine 0 93, AST 18, ALT 28, alk-phos 106, bili 0 65  3D bilateral screening mammogram and right breast ultrasound from August 24, 2017:  Breast are heterogeneously dense, no suspicious masses or calcifications, there is a 5 7 x 7 9 x 4 8 mm simple cyst at the 12 o'clock position of the right breast 2 cm from the nipple and 2 6 x 1 3 mm simple cyst at the 8 o'clock position of the right breast 5 cm from the nipple  Assessment/Plan:    Lorena Recinos remains in clinical remission of infiltrating ductal carcinoma of left breast, pTicpN0, hormone receptor negative, HER-2/alexia negative, diagnosed November 2013  There is stable mild leukocytosis, neutrophil count and lymphocyte count are within normal range, and stable mild thrombocytosis for approximately 20 years  The patient aware to seek medical attention for lymph node enlargement, progressive joint pain of the knees or elsewhere, excessive fatigue, or if other new problems arise  Otherwise, I plan to see her again in 6 months

## 2018-04-03 NOTE — PATIENT INSTRUCTIONS
The patient aware to seek medical attention for lymph node enlargement, progressive joint pain of the knees or elsewhere, excessive fatigue, or if other new problems arise

## 2018-05-03 ENCOUNTER — TRANSCRIBE ORDERS (OUTPATIENT)
Dept: ADMINISTRATIVE | Facility: HOSPITAL | Age: 63
End: 2018-05-03

## 2018-05-03 ENCOUNTER — APPOINTMENT (OUTPATIENT)
Dept: LAB | Facility: MEDICAL CENTER | Age: 63
End: 2018-05-03
Payer: COMMERCIAL

## 2018-05-03 DIAGNOSIS — I10 ESSENTIAL HYPERTENSION, MALIGNANT: ICD-10-CM

## 2018-05-03 DIAGNOSIS — E03.9 HYPOTHYROIDISM, ADULT: Primary | ICD-10-CM

## 2018-05-03 DIAGNOSIS — E03.9 HYPOTHYROIDISM, ADULT: ICD-10-CM

## 2018-05-03 LAB
ALBUMIN SERPL BCP-MCNC: 3.8 G/DL (ref 3.5–5)
ALP SERPL-CCNC: 83 U/L (ref 46–116)
ALT SERPL W P-5'-P-CCNC: 24 U/L (ref 12–78)
ANION GAP SERPL CALCULATED.3IONS-SCNC: 4 MMOL/L (ref 4–13)
AST SERPL W P-5'-P-CCNC: 17 U/L (ref 5–45)
BASOPHILS # BLD AUTO: 0.05 THOUSANDS/ΜL (ref 0–0.1)
BASOPHILS NFR BLD AUTO: 1 % (ref 0–1)
BILIRUB SERPL-MCNC: 0.38 MG/DL (ref 0.2–1)
BUN SERPL-MCNC: 17 MG/DL (ref 5–25)
CALCIUM SERPL-MCNC: 9.2 MG/DL (ref 8.3–10.1)
CHLORIDE SERPL-SCNC: 105 MMOL/L (ref 100–108)
CHOLEST SERPL-MCNC: 193 MG/DL (ref 50–200)
CO2 SERPL-SCNC: 30 MMOL/L (ref 21–32)
CREAT SERPL-MCNC: 0.83 MG/DL (ref 0.6–1.3)
EOSINOPHIL # BLD AUTO: 0.28 THOUSAND/ΜL (ref 0–0.61)
EOSINOPHIL NFR BLD AUTO: 3 % (ref 0–6)
ERYTHROCYTE [DISTWIDTH] IN BLOOD BY AUTOMATED COUNT: 14.2 % (ref 11.6–15.1)
GFR SERPL CREATININE-BSD FRML MDRD: 76 ML/MIN/1.73SQ M
GLUCOSE P FAST SERPL-MCNC: 100 MG/DL (ref 65–99)
HCT VFR BLD AUTO: 41.1 % (ref 34.8–46.1)
HDLC SERPL-MCNC: 38 MG/DL (ref 40–60)
HGB BLD-MCNC: 14 G/DL (ref 11.5–15.4)
LDLC SERPL CALC-MCNC: 125 MG/DL (ref 0–100)
LYMPHOCYTES # BLD AUTO: 3.95 THOUSANDS/ΜL (ref 0.6–4.47)
LYMPHOCYTES NFR BLD AUTO: 38 % (ref 14–44)
MCH RBC QN AUTO: 32.4 PG (ref 26.8–34.3)
MCHC RBC AUTO-ENTMCNC: 34.1 G/DL (ref 31.4–37.4)
MCV RBC AUTO: 95 FL (ref 82–98)
MONOCYTES # BLD AUTO: 1.18 THOUSAND/ΜL (ref 0.17–1.22)
MONOCYTES NFR BLD AUTO: 12 % (ref 4–12)
NEUTROPHILS # BLD AUTO: 4.81 THOUSANDS/ΜL (ref 1.85–7.62)
NEUTS SEG NFR BLD AUTO: 46 % (ref 43–75)
NONHDLC SERPL-MCNC: 155 MG/DL
NRBC BLD AUTO-RTO: 0 /100 WBCS
PLATELET # BLD AUTO: 409 THOUSANDS/UL (ref 149–390)
PMV BLD AUTO: 10.6 FL (ref 8.9–12.7)
POTASSIUM SERPL-SCNC: 4.3 MMOL/L (ref 3.5–5.3)
PROT SERPL-MCNC: 7.6 G/DL (ref 6.4–8.2)
RBC # BLD AUTO: 4.32 MILLION/UL (ref 3.81–5.12)
SODIUM SERPL-SCNC: 139 MMOL/L (ref 136–145)
T4 FREE SERPL-MCNC: 1.09 NG/DL (ref 0.76–1.46)
TRIGL SERPL-MCNC: 150 MG/DL
TSH SERPL DL<=0.05 MIU/L-ACNC: 1.33 UIU/ML (ref 0.36–3.74)
WBC # BLD AUTO: 10.3 THOUSAND/UL (ref 4.31–10.16)

## 2018-05-03 PROCEDURE — 85025 COMPLETE CBC W/AUTO DIFF WBC: CPT

## 2018-05-03 PROCEDURE — 36415 COLL VENOUS BLD VENIPUNCTURE: CPT

## 2018-05-03 PROCEDURE — 84443 ASSAY THYROID STIM HORMONE: CPT

## 2018-05-03 PROCEDURE — 80053 COMPREHEN METABOLIC PANEL: CPT

## 2018-05-03 PROCEDURE — 84439 ASSAY OF FREE THYROXINE: CPT

## 2018-05-03 PROCEDURE — 80061 LIPID PANEL: CPT

## 2018-05-30 ENCOUNTER — ANNUAL EXAM (OUTPATIENT)
Dept: GYNECOLOGY | Facility: CLINIC | Age: 63
End: 2018-05-30
Payer: COMMERCIAL

## 2018-05-30 VITALS
DIASTOLIC BLOOD PRESSURE: 82 MMHG | WEIGHT: 200.4 LBS | BODY MASS INDEX: 35.51 KG/M2 | HEIGHT: 63 IN | SYSTOLIC BLOOD PRESSURE: 120 MMHG

## 2018-05-30 DIAGNOSIS — Z85.3 HISTORY OF LEFT BREAST CANCER: ICD-10-CM

## 2018-05-30 DIAGNOSIS — Z12.4 ENCOUNTER FOR PAPANICOLAOU SMEAR FOR CERVICAL CANCER SCREENING: ICD-10-CM

## 2018-05-30 DIAGNOSIS — Z13.820 SCREENING FOR OSTEOPOROSIS: ICD-10-CM

## 2018-05-30 DIAGNOSIS — Z01.419 ENCOUNTER FOR GYNECOLOGICAL EXAMINATION WITHOUT ABNORMAL FINDING: ICD-10-CM

## 2018-05-30 DIAGNOSIS — Z12.31 ENCOUNTER FOR SCREENING MAMMOGRAM FOR MALIGNANT NEOPLASM OF BREAST: Primary | ICD-10-CM

## 2018-05-30 PROCEDURE — S0612 ANNUAL GYNECOLOGICAL EXAMINA: HCPCS | Performed by: OBSTETRICS & GYNECOLOGY

## 2018-05-30 PROCEDURE — 76977 US BONE DENSITY MEASURE: CPT | Performed by: OBSTETRICS & GYNECOLOGY

## 2018-05-30 NOTE — PROGRESS NOTES
Assessment/Plan:         Diagnoses and all orders for this visit:    Encounter for screening mammogram for malignant neoplasm of breast  -     Mammo screening right w 3d & cad; Future    History of left breast cancer  -     Mammo screening right w 3d & cad; Future    Encounter for gynecological examination without abnormal finding    Screening for osteoporosis        Subjective:      Patient ID: Niya Mayer is a 65 y o  female  Hx IDC Lt breast g3, ER/CA,HER2 negative 2013  Lt mastectomy  No c/o  Colonoscopy due this summer    HPI    The following portions of the patient's history were reviewed and updated as appropriate: allergies, current medications, past family history, past medical history, past social history, past surgical history and problem list     Review of Systems   Constitutional: Negative  Gastrointestinal: Negative  Genitourinary: Negative  Objective:      /82   Ht 5' 2 5" (1 588 m)   Wt 90 9 kg (200 lb 6 4 oz)   BMI 36 07 kg/m²          Physical Exam   Constitutional: She appears well-developed and well-nourished  Neck: Normal range of motion  Neck supple  No thyromegaly present  Cardiovascular: Normal rate, regular rhythm and normal heart sounds  Pulmonary/Chest: Effort normal and breath sounds normal  Right breast exhibits no inverted nipple, no mass, no nipple discharge, no skin change and no tenderness  Left breast exhibits no skin change  s/p Lt mastectomy with reconstruction   Abdominal: Soft  Bowel sounds are normal  She exhibits no distension and no mass  There is no tenderness  Hernia confirmed negative in the right inguinal area and confirmed negative in the left inguinal area  Genitourinary: Uterus normal  There is no rash or lesion on the right labia  There is no rash or lesion on the left labia  Cervix exhibits no motion tenderness, no discharge and no friability  Right adnexum displays no mass, no tenderness and no fullness   Left adnexum displays no mass, no tenderness and no fullness  No erythema or bleeding in the vagina  No vaginal discharge found  Lymphadenopathy:        Right: No inguinal adenopathy present  Left: No inguinal adenopathy present       Heel: -0 4

## 2018-06-01 LAB
CLINICAL INFO: NORMAL
DATE PREVIOUS BX: NORMAL
LMP START DATE: NORMAL
SL AMB PREV. PAP:: NORMAL
SPECIMEN SOURCE CVX/VAG CYTO: NORMAL

## 2018-10-01 DIAGNOSIS — I10 HYPERTENSION, UNSPECIFIED TYPE: Primary | ICD-10-CM

## 2018-10-01 RX ORDER — LISINOPRIL 20 MG/1
20 TABLET ORAL DAILY
Qty: 30 TABLET | Refills: 2 | Status: SHIPPED | OUTPATIENT
Start: 2018-10-01 | End: 2018-11-26 | Stop reason: SDUPTHER

## 2018-10-01 NOTE — TELEPHONE ENCOUNTER
Patient called to request a medication refill for Lisinopril   20 mg and she would like it called into [facility] [phone]

## 2018-10-22 ENCOUNTER — APPOINTMENT (OUTPATIENT)
Dept: LAB | Facility: MEDICAL CENTER | Age: 63
End: 2018-10-22
Payer: COMMERCIAL

## 2018-10-22 ENCOUNTER — TRANSCRIBE ORDERS (OUTPATIENT)
Dept: ADMINISTRATIVE | Facility: HOSPITAL | Age: 63
End: 2018-10-22

## 2018-10-22 DIAGNOSIS — E03.8 ADULT ONSET HYPOTHYROIDISM: ICD-10-CM

## 2018-10-22 DIAGNOSIS — Z17.1 MALIGNANT NEOPLASM OF LEFT BREAST IN FEMALE, ESTROGEN RECEPTOR NEGATIVE, UNSPECIFIED SITE OF BREAST (HCC): ICD-10-CM

## 2018-10-22 DIAGNOSIS — I10 HYPERTENSION, UNSPECIFIED TYPE: ICD-10-CM

## 2018-10-22 DIAGNOSIS — M17.9 OSTEOARTHRITIS OF KNEE, UNSPECIFIED (CODE): ICD-10-CM

## 2018-10-22 DIAGNOSIS — D48.62 NEOPLASM OF UNCERTAIN BEHAVIOR OF LEFT BREAST: ICD-10-CM

## 2018-10-22 DIAGNOSIS — D72.829 LEUKOCYTOSIS, UNSPECIFIED TYPE: ICD-10-CM

## 2018-10-22 DIAGNOSIS — E55.9 VITAMIN D DEFICIENCY: ICD-10-CM

## 2018-10-22 DIAGNOSIS — D48.62 NEOPLASM OF UNCERTAIN BEHAVIOR OF LEFT BREAST: Primary | ICD-10-CM

## 2018-10-22 DIAGNOSIS — C50.912 MALIGNANT NEOPLASM OF LEFT BREAST IN FEMALE, ESTROGEN RECEPTOR NEGATIVE, UNSPECIFIED SITE OF BREAST (HCC): ICD-10-CM

## 2018-10-22 LAB
25(OH)D3 SERPL-MCNC: 26 NG/ML (ref 30–100)
ALBUMIN SERPL BCP-MCNC: 3.9 G/DL (ref 3.5–5)
ALP SERPL-CCNC: 92 U/L (ref 46–116)
ALT SERPL W P-5'-P-CCNC: 38 U/L (ref 12–78)
ANION GAP SERPL CALCULATED.3IONS-SCNC: 5 MMOL/L (ref 4–13)
AST SERPL W P-5'-P-CCNC: 25 U/L (ref 5–45)
BASOPHILS # BLD AUTO: 0.1 THOUSANDS/ΜL (ref 0–0.1)
BASOPHILS NFR BLD AUTO: 1 % (ref 0–1)
BILIRUB SERPL-MCNC: 0.62 MG/DL (ref 0.2–1)
BILIRUB UR QL STRIP: NEGATIVE
BUN SERPL-MCNC: 15 MG/DL (ref 5–25)
CALCIUM SERPL-MCNC: 9 MG/DL (ref 8.3–10.1)
CANCER AG27-29 SERPL-ACNC: 14.8 U/ML (ref 0–42.3)
CHLORIDE SERPL-SCNC: 101 MMOL/L (ref 100–108)
CHOLEST SERPL-MCNC: 193 MG/DL (ref 50–200)
CLARITY UR: CLEAR
CO2 SERPL-SCNC: 29 MMOL/L (ref 21–32)
COLOR UR: YELLOW
CREAT SERPL-MCNC: 0.86 MG/DL (ref 0.6–1.3)
EOSINOPHIL # BLD AUTO: 0.36 THOUSAND/ΜL (ref 0–0.61)
EOSINOPHIL NFR BLD AUTO: 4 % (ref 0–6)
ERYTHROCYTE [DISTWIDTH] IN BLOOD BY AUTOMATED COUNT: 14 % (ref 11.6–15.1)
ERYTHROCYTE [SEDIMENTATION RATE] IN BLOOD: 10 MM/HOUR (ref 0–20)
GFR SERPL CREATININE-BSD FRML MDRD: 73 ML/MIN/1.73SQ M
GGT SERPL-CCNC: 29 U/L (ref 5–85)
GLUCOSE P FAST SERPL-MCNC: 94 MG/DL (ref 65–99)
GLUCOSE UR STRIP-MCNC: NEGATIVE MG/DL
HCT VFR BLD AUTO: 44.6 % (ref 34.8–46.1)
HDLC SERPL-MCNC: 36 MG/DL (ref 40–60)
HGB BLD-MCNC: 14.5 G/DL (ref 11.5–15.4)
HGB UR QL STRIP.AUTO: NEGATIVE
IMM GRANULOCYTES # BLD AUTO: 0.02 THOUSAND/UL (ref 0–0.2)
IMM GRANULOCYTES NFR BLD AUTO: 0 % (ref 0–2)
KETONES UR STRIP-MCNC: NEGATIVE MG/DL
LDLC SERPL CALC-MCNC: 117 MG/DL (ref 0–100)
LEUKOCYTE ESTERASE UR QL STRIP: NEGATIVE
LYMPHOCYTES # BLD AUTO: 3.59 THOUSANDS/ΜL (ref 0.6–4.47)
LYMPHOCYTES NFR BLD AUTO: 37 % (ref 14–44)
MAGNESIUM SERPL-MCNC: 2.2 MG/DL (ref 1.6–2.6)
MCH RBC QN AUTO: 31.9 PG (ref 26.8–34.3)
MCHC RBC AUTO-ENTMCNC: 32.5 G/DL (ref 31.4–37.4)
MCV RBC AUTO: 98 FL (ref 82–98)
MONOCYTES # BLD AUTO: 1 THOUSAND/ΜL (ref 0.17–1.22)
MONOCYTES NFR BLD AUTO: 10 % (ref 4–12)
NEUTROPHILS # BLD AUTO: 4.61 THOUSANDS/ΜL (ref 1.85–7.62)
NEUTS SEG NFR BLD AUTO: 48 % (ref 43–75)
NITRITE UR QL STRIP: NEGATIVE
NONHDLC SERPL-MCNC: 157 MG/DL
NRBC BLD AUTO-RTO: 0 /100 WBCS
PH UR STRIP.AUTO: 7 [PH] (ref 4.5–8)
PLATELET # BLD AUTO: 456 THOUSANDS/UL (ref 149–390)
PMV BLD AUTO: 10.3 FL (ref 8.9–12.7)
POTASSIUM SERPL-SCNC: 4.8 MMOL/L (ref 3.5–5.3)
PROT SERPL-MCNC: 7.7 G/DL (ref 6.4–8.2)
PROT UR STRIP-MCNC: NEGATIVE MG/DL
RBC # BLD AUTO: 4.54 MILLION/UL (ref 3.81–5.12)
SODIUM SERPL-SCNC: 135 MMOL/L (ref 136–145)
SP GR UR STRIP.AUTO: 1.01 (ref 1–1.03)
T4 FREE SERPL-MCNC: 1.12 NG/DL (ref 0.76–1.46)
TRIGL SERPL-MCNC: 202 MG/DL
TSH SERPL DL<=0.05 MIU/L-ACNC: 4.11 UIU/ML (ref 0.36–3.74)
URATE SERPL-MCNC: 5 MG/DL (ref 2–6.8)
UROBILINOGEN UR QL STRIP.AUTO: 0.2 E.U./DL
WBC # BLD AUTO: 9.68 THOUSAND/UL (ref 4.31–10.16)

## 2018-10-22 PROCEDURE — 83735 ASSAY OF MAGNESIUM: CPT

## 2018-10-22 PROCEDURE — 85025 COMPLETE CBC W/AUTO DIFF WBC: CPT

## 2018-10-22 PROCEDURE — 84550 ASSAY OF BLOOD/URIC ACID: CPT

## 2018-10-22 PROCEDURE — 86300 IMMUNOASSAY TUMOR CA 15-3: CPT

## 2018-10-22 PROCEDURE — 85652 RBC SED RATE AUTOMATED: CPT

## 2018-10-22 PROCEDURE — 80053 COMPREHEN METABOLIC PANEL: CPT

## 2018-10-22 PROCEDURE — 84439 ASSAY OF FREE THYROXINE: CPT

## 2018-10-22 PROCEDURE — 81003 URINALYSIS AUTO W/O SCOPE: CPT | Performed by: SPECIALIST

## 2018-10-22 PROCEDURE — 82306 VITAMIN D 25 HYDROXY: CPT

## 2018-10-22 PROCEDURE — 80061 LIPID PANEL: CPT

## 2018-10-22 PROCEDURE — 36415 COLL VENOUS BLD VENIPUNCTURE: CPT

## 2018-10-22 PROCEDURE — 84443 ASSAY THYROID STIM HORMONE: CPT

## 2018-10-22 PROCEDURE — 82977 ASSAY OF GGT: CPT

## 2018-10-30 ENCOUNTER — OFFICE VISIT (OUTPATIENT)
Dept: HEMATOLOGY ONCOLOGY | Facility: CLINIC | Age: 63
End: 2018-10-30
Payer: COMMERCIAL

## 2018-10-30 VITALS
RESPIRATION RATE: 18 BRPM | HEIGHT: 63 IN | OXYGEN SATURATION: 96 % | WEIGHT: 198 LBS | TEMPERATURE: 98.7 F | DIASTOLIC BLOOD PRESSURE: 86 MMHG | HEART RATE: 78 BPM | SYSTOLIC BLOOD PRESSURE: 136 MMHG | BODY MASS INDEX: 35.08 KG/M2

## 2018-10-30 DIAGNOSIS — D75.839 THROMBOCYTOSIS: ICD-10-CM

## 2018-10-30 DIAGNOSIS — C50.912 MALIGNANT NEOPLASM OF LEFT BREAST IN FEMALE, ESTROGEN RECEPTOR NEGATIVE, UNSPECIFIED SITE OF BREAST (HCC): Primary | ICD-10-CM

## 2018-10-30 DIAGNOSIS — Z17.1 MALIGNANT NEOPLASM OF LEFT BREAST IN FEMALE, ESTROGEN RECEPTOR NEGATIVE, UNSPECIFIED SITE OF BREAST (HCC): Primary | ICD-10-CM

## 2018-10-30 PROCEDURE — 99214 OFFICE O/P EST MOD 30 MIN: CPT | Performed by: INTERNAL MEDICINE

## 2018-10-30 NOTE — PROGRESS NOTES
Patient ID: Cezar Peters is a 65 y o  female  HPI     She has been feeling well  She has been taking calcium 600 mg with vitamin-D daily  She has been following up with Dr Sangeetha Dubois her breast surgeon every 6 months  Hematology/Oncology History:     1  Infiltrating ductal carcinoma, grade 3, 1 2 cm, ER negative, NC negative, HER-2/alexia negative and 0 7 cm, ER negative, NC negative, HER-2 negative, 3 sentinel lymph nodes with no metastatic tumor identified, pT1c,pN0, left total mastectomy and sentinel lymph node sampling  Dose dense Adriamycin and Cytoxan for 4 courses and then dose dense Taxol four courses from March 2014 to June 2014  Left breast implant reconstruction August 2014 and again in October 2014 and then TRAM flap reconstruction February 2015      2  Leukocytosis, mild, and thrombocytosis, mild, was diagnosed in 1998  Bone marrow examination in January 1999 was minimally hypercellular with increased megakaryocytes and JAK2 V617F mutation not detected in April 2008 at which time flow cytometry on peripheral blood demonstrated presence of granulocytes, lymphocytes, and monocytes without diagnostic changes of leukemia or lymphoma  Review of Systems      Constitutional: She has been feeling well, she denies chills or sweats  HEENT: She denies nose bleeds  She denies oral cavity or throat soreness  Cardiovascular:  She denies chest pain, no edema  Respiratory:  She denies cough or dyspnea  GI:  Her appetite has been good  No abdominal pain  Bowel habits have been formed and regular  Musculoskeletal: She has chronic arthritis of the knees  She denies back pain  Neurologic: She denies headache or paresthesias  She denies difficulty walking  Skin:  She denies rash  Psychiatric:  She denies emotional problems  Hematologic:  She denies easy bruising       Physical Exam      /86   Pulse 78   Temp 98 7 °F (37 1 °C)   Resp 18   Ht 5' 2 5" (1 588 m)   Wt 89 8 kg (198 lb)   SpO2 96%   BMI 35 64 kg/m²     She appears well  EOMI  Oropharynx clear  No lymphadenopathy of the neck  No axillary lymphadenopathy  (breast examination was not done today having been done recently at her breast surgeons office )   Lungs are clear bilaterally  Heart has regular rate and rhythm  No hepatic or splenic enlargement  No inguinal lymphadenopathy  No lower extremity edema  Normal gait and station  Neurological is grossly intact  Oriented x3, normal mood and affect      ECOG 0    Laboratory:    From October 22, 2000 6:25 p m  hydroxy vitamin-D is 26, TSH is 4 110, free T4 is 1 12, CA 27 29 is 14 8, creatinine 0 86, AST 25, ALT 38, alk-phos 92, bili 0 62, WBC 9 68, hemoglobin 14 5, platelets 843    Bilateral 3D screening mammogram from August 23, 2018: There are scattered areas of fibroglandular density, no suspicious spiculated lesion, architectural distortion or suspicious microcalcification cluster, there are a few benign calcifications and benign axillary nodes  Assessment/Plan:     Kesha Burleson remains in clinical remission of infiltrating ductal carcinoma of left breast, pTicpN0, hormone receptor negative, HER-2/alexia negative, diagnosed November 2013       There is top-normal to mildly elevated WBC and stable mild thrombocytosis for approximately 20 years  In view of mild vitamin D insufficiency she was instructed to take vitamin-D 1000 units daily in addition to calcium 600 mg of vitamin-D daily      The patient aware to seek medical attention for lymph node enlargement, progressive joint pain of the knees or elsewhere, excessive fatigue, or if other new problems arise  Otherwise, I plan to see her again in 6 months

## 2018-11-05 RX ORDER — CIPROFLOXACIN 500 MG/1
TABLET, FILM COATED ORAL
COMMUNITY
End: 2019-06-11 | Stop reason: ALTCHOICE

## 2018-11-05 RX ORDER — OXYCODONE HYDROCHLORIDE AND ACETAMINOPHEN 5; 325 MG/1; MG/1
TABLET ORAL
COMMUNITY
End: 2019-02-15

## 2018-11-05 RX ORDER — METHYLPREDNISOLONE ACETATE 40 MG/ML
INJECTION, SUSPENSION INTRA-ARTICULAR; INTRALESIONAL; INTRAMUSCULAR; SOFT TISSUE
COMMUNITY
End: 2019-06-11 | Stop reason: ALTCHOICE

## 2018-11-05 RX ORDER — HYALURONATE SODIUM 10 MG/ML
SYRINGE (ML) INTRAARTICULAR
COMMUNITY
End: 2019-11-06

## 2018-11-06 ENCOUNTER — OFFICE VISIT (OUTPATIENT)
Dept: FAMILY MEDICINE CLINIC | Facility: CLINIC | Age: 63
End: 2018-11-06
Payer: COMMERCIAL

## 2018-11-06 VITALS
TEMPERATURE: 98.7 F | OXYGEN SATURATION: 97 % | WEIGHT: 200 LBS | BODY MASS INDEX: 36 KG/M2 | HEART RATE: 92 BPM | DIASTOLIC BLOOD PRESSURE: 92 MMHG | RESPIRATION RATE: 16 BRPM | SYSTOLIC BLOOD PRESSURE: 154 MMHG

## 2018-11-06 DIAGNOSIS — M17.0 OSTEOARTHRITIS OF BOTH KNEES, UNSPECIFIED OSTEOARTHRITIS TYPE: ICD-10-CM

## 2018-11-06 DIAGNOSIS — Z12.11 SCREENING FOR COLON CANCER: Primary | ICD-10-CM

## 2018-11-06 DIAGNOSIS — I10 ESSENTIAL HYPERTENSION: ICD-10-CM

## 2018-11-06 DIAGNOSIS — E03.9 ACQUIRED HYPOTHYROIDISM: ICD-10-CM

## 2018-11-06 DIAGNOSIS — C50.912 MALIGNANT NEOPLASM OF LEFT FEMALE BREAST, UNSPECIFIED ESTROGEN RECEPTOR STATUS, UNSPECIFIED SITE OF BREAST (HCC): ICD-10-CM

## 2018-11-06 DIAGNOSIS — E78.5 DYSLIPIDEMIA: ICD-10-CM

## 2018-11-06 PROCEDURE — 99213 OFFICE O/P EST LOW 20 MIN: CPT | Performed by: SPECIALIST

## 2018-11-06 PROCEDURE — 1036F TOBACCO NON-USER: CPT | Performed by: SPECIALIST

## 2018-11-06 NOTE — PROGRESS NOTES
Assessment/Plan:    Pt  Seen   Lab  Reviewed  Hx  Left  Breast  Ca    2014  Sees   Oncology      Elevated  Triglycerides    Diet  Reviewed   Decrease  The  Cho    Vit  D   Low  On  Daily  vits    Add   d3   `1000   Units    Check  Stool   Hemoccult  Due  For  Colonoscopy    Sees  Dentist   Gyn   As   Directed    Non  Smoker    Repeat  bp    138/80    Needs  Home   bp   Cuff     Check  Am   bp     Had  Flu  vaccine             Diagnoses and all orders for this visit:    Screening for colon cancer  -     Ambulatory referral to Gastroenterology; Future    Other orders  -     azithromycin (AZASITE) 1 % ophthalmic solution; INSTILL 1 DROP INTO BOTH EYES TWICE A DAY FOR 7 DAYS, THEN ONCE A DAY FOR 4 WEEKS  -     ciprofloxacin (CIPRO) 500 mg tablet; ciprofloxacin 500 mg tablet  -     methylPREDNISolone acetate (DEPO-MEDROL) 40 mg/mL injection; 2-1mL dosages given by injection route into bilateral knees  -     diclofenac sodium (VOLTAREN) 1 %; APPLY 2 GRAM TO THE AFFECTED AREA(S) BY TOPICAL ROUTE 2 TIMES PER DAY  -     Sodium Hyaluronate (EUFLEXXA) 20 MG/2ML SOSY; 2-2ml knee injection  -     oxyCODONE-acetaminophen (PERCOCET) 5-325 mg per tablet; oxycodone-acetaminophen 5 mg-325 mg tablet  -     Discontinue: Cetirizine HCl (ZYRTEC ALLERGY) 10 MG CAPS; Daily          Subjective:      Patient ID: Alix Olmos is a 65 y o  female  64 Yo    Stable            The following portions of the patient's history were reviewed and updated as appropriate: allergies, current medications, past family history, past medical history, past social history, past surgical history and problem list     Review of Systems   Constitutional: Negative for activity change, appetite change, chills, diaphoresis, fatigue and fever  HENT: Negative for congestion, sinus pain, sinus pressure and voice change  Eyes: Negative for visual disturbance  Respiratory: Negative for shortness of breath and wheezing      Cardiovascular: Negative for chest pain, palpitations and leg swelling  Gastrointestinal: Negative for abdominal distention, abdominal pain and anal bleeding  Genitourinary: Negative for difficulty urinating  Musculoskeletal: Positive for arthralgias  Negative for back pain, gait problem, joint swelling, myalgias and neck pain  Skin: Negative  Neurological: Negative for dizziness, tremors, seizures, syncope, facial asymmetry, speech difficulty, weakness, light-headedness, numbness and headaches  Psychiatric/Behavioral: Negative for agitation and confusion  Objective:      /92 (BP Location: Right arm, Patient Position: Sitting, Cuff Size: Adult)   Pulse 92   Temp 98 7 °F (37 1 °C) (Tympanic)   Resp 16   Wt 90 7 kg (200 lb)   SpO2 97%   BMI 36 00 kg/m²          Physical Exam   Constitutional: She is oriented to person, place, and time  She appears well-developed and well-nourished  No distress  HENT:   Head: Normocephalic  Right Ear: External ear normal    Left Ear: External ear normal    Eyes: Pupils are equal, round, and reactive to light  Conjunctivae and EOM are normal  Right eye exhibits no discharge  Left eye exhibits no discharge  No scleral icterus  Neck: No JVD present  Cardiovascular: Normal rate, regular rhythm, normal heart sounds and intact distal pulses  No murmur heard  Pulmonary/Chest: Effort normal and breath sounds normal    Abdominal: She exhibits distension  Musculoskeletal: She exhibits no edema or tenderness  Neurological: She is alert and oriented to person, place, and time  Coordination normal    Skin: Skin is warm and dry  No rash noted  She is not diaphoretic  No erythema  No pallor  Psychiatric: She has a normal mood and affect   Her behavior is normal

## 2018-11-26 DIAGNOSIS — I10 HYPERTENSION, UNSPECIFIED TYPE: ICD-10-CM

## 2018-11-26 RX ORDER — LISINOPRIL 20 MG/1
20 TABLET ORAL DAILY
Qty: 90 TABLET | Refills: 1 | Status: SHIPPED | OUTPATIENT
Start: 2018-11-26 | End: 2020-02-21

## 2019-01-04 ENCOUNTER — APPOINTMENT (OUTPATIENT)
Dept: LAB | Facility: HOSPITAL | Age: 64
End: 2019-01-04
Payer: COMMERCIAL

## 2019-01-04 DIAGNOSIS — Z12.11 SCREENING FOR COLON CANCER: ICD-10-CM

## 2019-01-04 LAB — HEMOCCULT STL QL IA: POSITIVE

## 2019-01-04 PROCEDURE — G0328 FECAL BLOOD SCRN IMMUNOASSAY: HCPCS

## 2019-01-09 ENCOUNTER — TELEPHONE (OUTPATIENT)
Dept: FAMILY MEDICINE CLINIC | Facility: CLINIC | Age: 64
End: 2019-01-09

## 2019-01-09 NOTE — TELEPHONE ENCOUNTER
Called patient to let her know occult blood test was (+)  Dr Concepcion Combs is referring her to GI for further testing   She stated an understanding

## 2019-02-15 ENCOUNTER — OFFICE VISIT (OUTPATIENT)
Dept: FAMILY MEDICINE CLINIC | Facility: CLINIC | Age: 64
End: 2019-02-15
Payer: COMMERCIAL

## 2019-02-15 ENCOUNTER — APPOINTMENT (OUTPATIENT)
Dept: RADIOLOGY | Facility: MEDICAL CENTER | Age: 64
End: 2019-02-15
Payer: COMMERCIAL

## 2019-02-15 VITALS
SYSTOLIC BLOOD PRESSURE: 140 MMHG | HEIGHT: 63 IN | TEMPERATURE: 99.9 F | BODY MASS INDEX: 34.73 KG/M2 | WEIGHT: 196 LBS | OXYGEN SATURATION: 96 % | DIASTOLIC BLOOD PRESSURE: 90 MMHG | HEART RATE: 85 BPM | RESPIRATION RATE: 17 BRPM

## 2019-02-15 DIAGNOSIS — E03.8 OTHER SPECIFIED HYPOTHYROIDISM: ICD-10-CM

## 2019-02-15 DIAGNOSIS — J06.9 UPPER RESPIRATORY TRACT INFECTION, UNSPECIFIED TYPE: Primary | ICD-10-CM

## 2019-02-15 DIAGNOSIS — C50.912 MALIGNANT NEOPLASM OF LEFT BREAST IN FEMALE, ESTROGEN RECEPTOR NEGATIVE, UNSPECIFIED SITE OF BREAST (HCC): ICD-10-CM

## 2019-02-15 DIAGNOSIS — J06.9 UPPER RESPIRATORY TRACT INFECTION, UNSPECIFIED TYPE: ICD-10-CM

## 2019-02-15 DIAGNOSIS — Z17.1 MALIGNANT NEOPLASM OF LEFT BREAST IN FEMALE, ESTROGEN RECEPTOR NEGATIVE, UNSPECIFIED SITE OF BREAST (HCC): ICD-10-CM

## 2019-02-15 DIAGNOSIS — J30.2 SEASONAL ALLERGIC RHINITIS, UNSPECIFIED TRIGGER: ICD-10-CM

## 2019-02-15 DIAGNOSIS — J20.8 ACUTE BRONCHITIS DUE TO OTHER SPECIFIED ORGANISMS: ICD-10-CM

## 2019-02-15 PROCEDURE — 71046 X-RAY EXAM CHEST 2 VIEWS: CPT

## 2019-02-15 PROCEDURE — 99214 OFFICE O/P EST MOD 30 MIN: CPT | Performed by: INTERNAL MEDICINE

## 2019-02-15 PROCEDURE — 3008F BODY MASS INDEX DOCD: CPT | Performed by: INTERNAL MEDICINE

## 2019-02-15 RX ORDER — AZITHROMYCIN 500 MG/1
500 TABLET, FILM COATED ORAL DAILY
Qty: 7 TABLET | Refills: 0 | Status: SHIPPED | OUTPATIENT
Start: 2019-02-15 | End: 2019-02-22

## 2019-02-15 RX ORDER — BENZONATATE 200 MG/1
200 CAPSULE ORAL 3 TIMES DAILY PRN
Qty: 30 CAPSULE | Refills: 1 | Status: SHIPPED | OUTPATIENT
Start: 2019-02-15 | End: 2019-06-11 | Stop reason: ALTCHOICE

## 2019-02-15 NOTE — PROGRESS NOTES
Assessment/Plan:  Patient will be given has a 3 mycin 500 mg daily for 7 days for possible pneumonia bibasilar versus acute bronchitis  Tessalon Perles were ordered for cough  Chest x-ray was ordered to rule out pneumonia  Addendum:  Chest x-ray results are now available and showed clear lung fields with no evidence of pneumonia or other pathology  Diet reviewed  Lifestyle modifications reviewed  Medications reviewed and ordered  Laboratory tests and studies reviewed and ordered  All patient's questions answered to patient satisfaction  Diagnoses and all orders for this visit:    Upper respiratory tract infection, unspecified type  -     benzonatate (TESSALON) 200 MG capsule; Take 1 capsule (200 mg total) by mouth 3 (three) times a day as needed for cough  -     azithromycin (ZITHROMAX) 500 MG tablet; Take 1 tablet (500 mg total) by mouth daily for 7 days  -     XR chest pa & lateral; Future    Acute bronchitis due to other specified organisms    Seasonal allergic rhinitis, unspecified trigger    Malignant neoplasm of left breast in female, estrogen receptor negative, unspecified site of breast ([facility] [address] )    Other specified hypothyroidism        Subjective:      Patient ID: Maru Silveira is a 59 y o  female  HPI  This 59-year-old female is seen for the following:    Mild sore throat nasal congestion low-grade fever hacking persistent cough minimally productive of clear sputum he for 4 days  She has a history of seasonal allergies and has stopped taking her Zyrtec while she is sick she normally takes this daily  She has a history in the past of breast cancer and hypothyroidism      Current Outpatient Medications:     azithromycin (AZASITE) 1 % ophthalmic solution, INSTILL 1 DROP INTO BOTH EYES TWICE A DAY FOR 7 DAYS, THEN ONCE A DAY FOR 4 WEEKS, Disp: , Rfl:     azithromycin (ZITHROMAX) 500 MG tablet, Take 1 tablet (500 mg total) by mouth daily for 7 days, Disp: 7 tablet, Rfl: 0    benzonatate (TESSALON) 200 MG capsule, Take 1 capsule (200 mg total) by mouth 3 (three) times a day as needed for cough, Disp: 30 capsule, Rfl: 1    Calcium Carb-Cholecalciferol (CALCIUM 1000 + D) 1000-800 MG-UNIT TABS, Take by mouth, Disp: , Rfl:     Cetirizine HCl (ZYRTEC ALLERGY) 10 MG CAPS, Take by mouth, Disp: , Rfl:     ciprofloxacin (CIPRO) 500 mg tablet, ciprofloxacin 500 mg tablet, Disp: , Rfl:     diclofenac sodium (VOLTAREN) 1 %, APPLY 2 GRAM TO THE AFFECTED AREA(S) BY TOPICAL ROUTE 2 TIMES PER DAY, Disp: , Rfl:     levothyroxine 125 mcg tablet, Take 125 mcg by mouth daily As directed, Disp: , Rfl: 1    lisinopril (ZESTRIL) 20 mg tablet, Take 1 tablet (20 mg total) by mouth daily, Disp: 90 tablet, Rfl: 1    methylPREDNISolone acetate (DEPO-MEDROL) 40 mg/mL injection, 2-1mL dosages given by injection route into bilateral knees  , Disp: , Rfl:     Multiple Vitamin (MULTI-DAY VITAMINS) TABS, Take by mouth, Disp: , Rfl:     Omega-3 Fatty Acids ([employer] FISH OIL) 1000 MG CAPS, Take by mouth, Disp: , Rfl:     Sodium Hyaluronate (EUFLEXXA) 20 MG/2ML SOSY, 2-2ml knee injection, Disp: , Rfl:     The following portions of the patient's history were reviewed and updated as appropriate: allergies, current medications, past family history, past medical history, past social history, past surgical history and problem list     Review of Systems   Constitutional: Negative for appetite change, fatigue, fever and unexpected weight change  HENT: Positive for rhinorrhea, sneezing and sore throat  Negative for sinus pressure and sinus pain  Eyes: Negative for visual disturbance  Respiratory: Positive for cough  Negative for chest tightness, shortness of breath and wheezing  Cardiovascular: Negative for chest pain, palpitations and leg swelling  Gastrointestinal: Negative for abdominal distention, abdominal pain, blood in stool, constipation, diarrhea, nausea and vomiting  Endocrine: Negative for polydipsia and polyuria  Genitourinary: Negative for decreased urine volume, difficulty urinating, dysuria, hematuria and urgency  Musculoskeletal: Negative for arthralgias, back pain, joint swelling and neck pain  Skin: Negative for rash  Allergic/Immunologic: Negative for environmental allergies  Neurological: Negative for tremors, weakness, light-headedness, numbness and headaches  Hematological: Does not bruise/bleed easily  Psychiatric/Behavioral: Negative for agitation, behavioral problems, confusion and dysphoric mood  The patient is not nervous/anxious  Family History   Problem Relation Age of Onset    Diabetes Father     Hypertension Father     Bone cancer Father     Diabetes Sister     Heart attack Mother     Stroke Mother         CVA?        Past Medical History:   Diagnosis Date    Alopecia totalis     Arthritis of knee     bilateral    Breast CA (HCC)     Left    Herpes zoster     Hypothyroidism     Lymphadenopathy, mediastinal     Menopause     Multiple allergies     PHN (postherpetic neuralgia)     Postherpetic neuralgia     Thrombocytopathy (HCC)        Past Surgical History:   Procedure Laterality Date    BREAST RECONSTRUCTION      MASTECTOMY, RADICAL Left     OVARIAN CYST REMOVAL  2002    TUBAL LIGATION Bilateral     WISDOM TOOTH EXTRACTION         Social History     Socioeconomic History    Marital status: /Civil Union     Spouse name: None    Number of children: None    Years of education: None    Highest education level: None   Occupational History    None   Social Needs    Financial resource strain: None    Food insecurity:     Worry: None     Inability: None    Transportation needs:     Medical: None     Non-medical: None   Tobacco Use    Smoking status: Former Smoker     Packs/day: 6 00     Types: Cigarettes     Last attempt to quit: 1980     Years since quittin 1    Smokeless tobacco: Never Used    Tobacco comment: as per [employer]   Substance and Sexual Activity    Alcohol use: None    Drug use: None    Sexual activity: None   Lifestyle    Physical activity:     Days per week: None     Minutes per session: None    Stress: None   Relationships    Social connections:     Talks on phone: None     Gets together: None     Attends Jain service: None     Active member of club or organization: None     Attends meetings of clubs or organizations: None     Relationship status: None    Intimate partner violence:     Fear of current or ex partner: None     Emotionally abused: None     Physically abused: None     Forced sexual activity: None   Other Topics Concern    None   Social History Narrative    None       Allergies   Allergen Reactions    Clarithromycin     Clindamycin Abdominal Pain    Other     Sulfanilamide     Adhesive [Medical Tape] Rash    Penicillins Rash    Sulfa Antibiotics Rash         BMI Counseling: Body mass index is 35 28 kg/m²  Discussed the patient's BMI with her  The BMI is above average  BMI counseling and education was provided to the patient  Nutrition recommendations include decreasing overall calorie intake  Exercise recommendations include exercising 3-5 times per week  Objective:      /90   Pulse 85   Temp 99 9 °F (37 7 °C)   Resp 17   Ht 5' 2 5" (1 588 m)   Wt 88 9 kg (196 lb)   SpO2 96%   Breastfeeding? No   BMI 35 28 kg/m²        Physical Exam   Pulmonary/Chest: She has rales (Bibasilar dry crackles on inspiration which do not clear with deep breathing)

## 2019-02-16 PROBLEM — E03.9 HYPOTHYROIDISM: Status: ACTIVE | Noted: 2019-02-16

## 2019-02-17 ENCOUNTER — TELEPHONE (OUTPATIENT)
Dept: OTHER | Facility: OTHER | Age: 64
End: 2019-02-17

## 2019-02-17 NOTE — TELEPHONE ENCOUNTER
Amando Scott 1955  CONFIDENTIALTY NOTICE: This fax transmission is intended only for the addressee  It contains information that is legally privileged,  confidential or otherwise protected from use or disclosure  If you are not the intended recipient, you are strictly prohibited from reviewing,  disclosing, copying using or disseminating any of this information or taking any action in reliance on or regarding this information  If you have  received this fax in error, please notify us immediately by telephone so that we can arrange for its return to us  Page: 1 of 2  Call Id: [id number]  Health Call  Standard Call Report  Health Call  Patient Name: Amando Scott  Gender: Female  : 1955  Age: 59 Y 2 M 25 D  Return Phone  Number: [phone] (Home)  Address: [address]/State/Zip: [address] [place] [address]  Practice Name: Estefani Larios / Yaneth Canales  Practice Charged:  Physician:  Columba Brown Name:  Relationship To  Patient: Self  Return Phone Number: [phone] (Home)  Presenting Problem: "I was seen in the office on Friday and  prescribed azithromycin for a sinus  infection  I developed a reaction and  am unsure on what I need to do next "  Service Type: Triage  Charged Service 1: Eleonora Freitas U  [address]  Name and  Number:  Nurse Assessment  Nurse: Israel Fuentes Date/Time: 2019 8:49:44 AM  Type of assessment required:  ---General (Adult or Child)  Duration of Current S/S  ---Since yesterday afternoon  Location/Radiation  ---Face  Temperature (F) and route:  ---Denies fever  Symptom Specific Meds (Dose/Time):  ---None  Other S/S  ---Is on azithromycin for Sinus Infection  Yesterday noticed that her face became puffy  Lips and inside of mouth also became itchy  No difficulty breathing or wheezing  Skin became painful to touch at some point late at night  Took Benadryl last night and  symptoms have since resolved   Is concerned about stopping antibiotic and possibly  starting a new antibiotic  Pain Scale on scale of 1-10, 10 being the worst:  ---No pain  Symptom progression:  Amando Scott 1955  CONFIDENTIALTY NOTICE: This fax transmission is intended only for the addressee  It contains information that is legally privileged,  confidential or otherwise protected from use or disclosure  If you are not the intended recipient, you are strictly prohibited from reviewing,  disclosing, copying using or disseminating any of this information or taking any action in reliance on or regarding this information  If you have  received this fax in error, please notify us immediately by telephone so that we can arrange for its return to us  Page: 2 of 2  Call Id: [id number]  Nurse Assessment  ---better  Intake and Output  ---Drinking normally / WNL  Last Exam/Treatment:  ---Seen in the office on 2/15/2019 for Sinus Infection and prescribed azithromycin  500mg once daily for 7 days  Took dose on Friday and yesterday- nothing today  (2/17/2019)  Protocols  Protocol Title Nurse Date/Time  Anaphylaxis Jersey Altamirano RN, Rolfe Perch 2/17/2019 8:58:38 AM  Question Caller Affirmed  Disp  Time Disposition Final User  2/17/2019 9:13:54 AM See PCP When Office is Open (within 3  days)  Jersey Altamirano RN, Rolfe Perch  2/17/2019 9:18:46 AM RN Triaged Yes Jersey Altamirano RN, [address] Advice Given Per Protocol  SEE PCP WITHIN 3 DAYS: * You need to be seen within 2 or 3 days  Call your doctor during regular office hours and make an  appointment  An urgent care center is often the best source of care if your doctor's office is closed or you can't get an appointment  NOTE: If office will be open tomorrow, tell caller to call then, not in 3 days  OBSERVE: Watch out for any new symptoms during the  next 2 hours  AVOID ALLERGENS: If you think your symptoms were triggered by a particular substance (e g , food, cat, medications,  weeds), avoid it until you have seen or talked with your doctor   CALL BACK IF: * Difficulty breathing occurs * Difficulty swallowing  occurs * Allergic symptoms come back  CARE ADVICE given per Anaphylaxis (Adult) guideline  Caller Understands: Yes  Caller Disagree/Comply: Comply  PreDisposition: Unsure  Comments  User: Susy Qureshi RN Date/Time: 2/17/2019 9:18:33 AM  Patient triaged and recommendation was for her to stop antibiotic and follow up with the office when they reopen  Patient was  concerned on whether she should start a different antibiotic for the sinus infection  Paged on call provider (Dr Sidney Pryor)  who recommended for patient to follow up with the office tomorrow - patient has a large allergy history and does not want to  complicate her symptoms by potentially causing a bigger reaction  Explained doctors recommendations to patient and patient  verbalized understanding  Will follow up with the office when it reopens

## 2019-02-18 ENCOUNTER — TELEPHONE (OUTPATIENT)
Dept: FAMILY MEDICINE CLINIC | Facility: CLINIC | Age: 64
End: 2019-02-18

## 2019-02-18 NOTE — TELEPHONE ENCOUNTER
Patient called and was seen by Dr Maribell Zamora on 2/15/19 for severe bronchitis and sinus infection  She said that she thinks that she had a reaction to the medication that was prescribed for her  She spoke to someone at [facility] over the weekend and they told her stop using the medication and only use her Flonase  She did that and she said that she started to have a reaction to the Flonase  Her said that she had swelling and tingling of her lips but NOT of her throat  She wanted to know if she should continue to take her Z-mitzi or wait a little longer   Please advise the patient [phone]

## 2019-02-19 ENCOUNTER — TELEPHONE (OUTPATIENT)
Dept: FAMILY MEDICINE CLINIC | Facility: CLINIC | Age: 64
End: 2019-02-19

## 2019-02-19 NOTE — TELEPHONE ENCOUNTER
Patient called and was seen by Dr Rex Medel on 2/15/19 for severe bronchitis and sinus infection  She said that she thinks that she had a reaction to the medication that was prescribed for her  She spoke to someone at [facility] over the weekend and they told her stop using the medication and only use her Flonase  She did that and she said that she started to have a reaction to the Flonase  Her said that she had swelling and tingling of her lips but NOT of her throat  She wanted to know if she should continue to take her Z-mitzi or wait a little longer   Please advise the patient [phone]

## 2019-02-20 NOTE — TELEPHONE ENCOUNTER
If  There  Is  Any question  Of  Allergic  Reaction  Then  Stop  The  Medications  And  If bronchitis  Still  Present  Get  Re  Examines  By  Dr Maye Hunt

## 2019-04-18 ENCOUNTER — TRANSCRIBE ORDERS (OUTPATIENT)
Dept: ADMINISTRATIVE | Facility: HOSPITAL | Age: 64
End: 2019-04-18

## 2019-04-18 DIAGNOSIS — K62.5 HEMORRHAGE OF RECTUM AND ANUS: Primary | ICD-10-CM

## 2019-04-24 ENCOUNTER — APPOINTMENT (OUTPATIENT)
Dept: LAB | Facility: MEDICAL CENTER | Age: 64
End: 2019-04-24
Payer: COMMERCIAL

## 2019-04-24 DIAGNOSIS — C50.912 MALIGNANT NEOPLASM OF LEFT BREAST IN FEMALE, ESTROGEN RECEPTOR NEGATIVE, UNSPECIFIED SITE OF BREAST (HCC): ICD-10-CM

## 2019-04-24 DIAGNOSIS — I10 ESSENTIAL HYPERTENSION: ICD-10-CM

## 2019-04-24 DIAGNOSIS — D75.839 THROMBOCYTOSIS: ICD-10-CM

## 2019-04-24 DIAGNOSIS — E03.9 ACQUIRED HYPOTHYROIDISM: ICD-10-CM

## 2019-04-24 DIAGNOSIS — E78.5 DYSLIPIDEMIA: ICD-10-CM

## 2019-04-24 DIAGNOSIS — Z17.1 MALIGNANT NEOPLASM OF LEFT BREAST IN FEMALE, ESTROGEN RECEPTOR NEGATIVE, UNSPECIFIED SITE OF BREAST (HCC): ICD-10-CM

## 2019-04-24 DIAGNOSIS — Z12.11 SCREENING FOR COLON CANCER: ICD-10-CM

## 2019-04-24 LAB
25(OH)D3 SERPL-MCNC: 46.7 NG/ML (ref 30–100)
ALBUMIN SERPL BCP-MCNC: 3.8 G/DL (ref 3.5–5)
ALP SERPL-CCNC: 87 U/L (ref 46–116)
ALT SERPL W P-5'-P-CCNC: 33 U/L (ref 12–78)
ANION GAP SERPL CALCULATED.3IONS-SCNC: 5 MMOL/L (ref 4–13)
AST SERPL W P-5'-P-CCNC: 26 U/L (ref 5–45)
BASOPHILS # BLD AUTO: 0.09 THOUSANDS/ΜL (ref 0–0.1)
BASOPHILS NFR BLD AUTO: 1 % (ref 0–1)
BILIRUB SERPL-MCNC: 0.42 MG/DL (ref 0.2–1)
BILIRUB UR QL STRIP: NEGATIVE
BUN SERPL-MCNC: 18 MG/DL (ref 5–25)
CALCIUM SERPL-MCNC: 9 MG/DL (ref 8.3–10.1)
CANCER AG27-29 SERPL-ACNC: 18.8 U/ML (ref 0–42.3)
CHLORIDE SERPL-SCNC: 104 MMOL/L (ref 100–108)
CHOLEST SERPL-MCNC: 220 MG/DL (ref 50–200)
CLARITY UR: CLEAR
CO2 SERPL-SCNC: 27 MMOL/L (ref 21–32)
COLOR UR: YELLOW
CREAT SERPL-MCNC: 0.8 MG/DL (ref 0.6–1.3)
EOSINOPHIL # BLD AUTO: 0.27 THOUSAND/ΜL (ref 0–0.61)
EOSINOPHIL NFR BLD AUTO: 3 % (ref 0–6)
ERYTHROCYTE [DISTWIDTH] IN BLOOD BY AUTOMATED COUNT: 14.3 % (ref 11.6–15.1)
GFR SERPL CREATININE-BSD FRML MDRD: 79 ML/MIN/1.73SQ M
GLUCOSE P FAST SERPL-MCNC: 89 MG/DL (ref 65–99)
GLUCOSE UR STRIP-MCNC: NEGATIVE MG/DL
HCT VFR BLD AUTO: 43.8 % (ref 34.8–46.1)
HDLC SERPL-MCNC: 37 MG/DL (ref 40–60)
HGB BLD-MCNC: 14 G/DL (ref 11.5–15.4)
HGB UR QL STRIP.AUTO: NEGATIVE
IMM GRANULOCYTES # BLD AUTO: 0.02 THOUSAND/UL (ref 0–0.2)
IMM GRANULOCYTES NFR BLD AUTO: 0 % (ref 0–2)
KETONES UR STRIP-MCNC: NEGATIVE MG/DL
LDLC SERPL CALC-MCNC: 142 MG/DL (ref 0–100)
LEUKOCYTE ESTERASE UR QL STRIP: NEGATIVE
LYMPHOCYTES # BLD AUTO: 4.1 THOUSANDS/ΜL (ref 0.6–4.47)
LYMPHOCYTES NFR BLD AUTO: 40 % (ref 14–44)
MCH RBC QN AUTO: 31.7 PG (ref 26.8–34.3)
MCHC RBC AUTO-ENTMCNC: 32 G/DL (ref 31.4–37.4)
MCV RBC AUTO: 99 FL (ref 82–98)
MONOCYTES # BLD AUTO: 1.06 THOUSAND/ΜL (ref 0.17–1.22)
MONOCYTES NFR BLD AUTO: 10 % (ref 4–12)
NEUTROPHILS # BLD AUTO: 4.66 THOUSANDS/ΜL (ref 1.85–7.62)
NEUTS SEG NFR BLD AUTO: 46 % (ref 43–75)
NITRITE UR QL STRIP: NEGATIVE
NONHDLC SERPL-MCNC: 183 MG/DL
NRBC BLD AUTO-RTO: 0 /100 WBCS
PH UR STRIP.AUTO: 7.5 [PH]
PLATELET # BLD AUTO: 419 THOUSANDS/UL (ref 149–390)
PMV BLD AUTO: 10.4 FL (ref 8.9–12.7)
POTASSIUM SERPL-SCNC: 5.1 MMOL/L (ref 3.5–5.3)
PROT SERPL-MCNC: 7.9 G/DL (ref 6.4–8.2)
PROT UR STRIP-MCNC: NEGATIVE MG/DL
RBC # BLD AUTO: 4.42 MILLION/UL (ref 3.81–5.12)
SODIUM SERPL-SCNC: 136 MMOL/L (ref 136–145)
SP GR UR STRIP.AUTO: 1.01 (ref 1–1.03)
T4 FREE SERPL-MCNC: 1.04 NG/DL (ref 0.76–1.46)
TRIGL SERPL-MCNC: 205 MG/DL
TSH SERPL DL<=0.05 MIU/L-ACNC: 2.02 UIU/ML (ref 0.36–3.74)
UROBILINOGEN UR QL STRIP.AUTO: 0.2 E.U./DL
WBC # BLD AUTO: 10.2 THOUSAND/UL (ref 4.31–10.16)

## 2019-04-24 PROCEDURE — 85025 COMPLETE CBC W/AUTO DIFF WBC: CPT

## 2019-04-24 PROCEDURE — 84443 ASSAY THYROID STIM HORMONE: CPT

## 2019-04-24 PROCEDURE — 36415 COLL VENOUS BLD VENIPUNCTURE: CPT

## 2019-04-24 PROCEDURE — 80061 LIPID PANEL: CPT

## 2019-04-24 PROCEDURE — 81003 URINALYSIS AUTO W/O SCOPE: CPT | Performed by: SPECIALIST

## 2019-04-24 PROCEDURE — 86300 IMMUNOASSAY TUMOR CA 15-3: CPT

## 2019-04-24 PROCEDURE — 80053 COMPREHEN METABOLIC PANEL: CPT

## 2019-04-24 PROCEDURE — 82306 VITAMIN D 25 HYDROXY: CPT

## 2019-04-24 PROCEDURE — 84439 ASSAY OF FREE THYROXINE: CPT

## 2019-04-30 ENCOUNTER — OFFICE VISIT (OUTPATIENT)
Dept: HEMATOLOGY ONCOLOGY | Facility: CLINIC | Age: 64
End: 2019-04-30
Payer: COMMERCIAL

## 2019-04-30 VITALS
OXYGEN SATURATION: 97 % | SYSTOLIC BLOOD PRESSURE: 140 MMHG | HEIGHT: 63 IN | WEIGHT: 194 LBS | DIASTOLIC BLOOD PRESSURE: 92 MMHG | BODY MASS INDEX: 34.38 KG/M2 | HEART RATE: 67 BPM | RESPIRATION RATE: 18 BRPM | TEMPERATURE: 98.7 F

## 2019-04-30 DIAGNOSIS — D72.829 LEUKOCYTOSIS, UNSPECIFIED TYPE: ICD-10-CM

## 2019-04-30 DIAGNOSIS — D75.839 THROMBOCYTOSIS: ICD-10-CM

## 2019-04-30 DIAGNOSIS — C50.912 MALIGNANT NEOPLASM OF LEFT BREAST IN FEMALE, ESTROGEN RECEPTOR NEGATIVE, UNSPECIFIED SITE OF BREAST (HCC): Primary | ICD-10-CM

## 2019-04-30 DIAGNOSIS — Z17.1 MALIGNANT NEOPLASM OF LEFT BREAST IN FEMALE, ESTROGEN RECEPTOR NEGATIVE, UNSPECIFIED SITE OF BREAST (HCC): Primary | ICD-10-CM

## 2019-04-30 PROCEDURE — 99214 OFFICE O/P EST MOD 30 MIN: CPT | Performed by: INTERNAL MEDICINE

## 2019-04-30 RX ORDER — ASPIRIN 81 MG/1
81 TABLET ORAL DAILY
COMMUNITY
End: 2019-12-13

## 2019-05-06 ENCOUNTER — OFFICE VISIT (OUTPATIENT)
Dept: FAMILY MEDICINE CLINIC | Facility: CLINIC | Age: 64
End: 2019-05-06
Payer: COMMERCIAL

## 2019-05-06 VITALS
HEART RATE: 74 BPM | WEIGHT: 196 LBS | TEMPERATURE: 98.8 F | BODY MASS INDEX: 34.73 KG/M2 | OXYGEN SATURATION: 98 % | DIASTOLIC BLOOD PRESSURE: 74 MMHG | HEIGHT: 63 IN | SYSTOLIC BLOOD PRESSURE: 126 MMHG

## 2019-05-06 DIAGNOSIS — I10 HYPERTENSION, UNSPECIFIED TYPE: ICD-10-CM

## 2019-05-06 DIAGNOSIS — Z17.1 MALIGNANT NEOPLASM OF LEFT BREAST IN FEMALE, ESTROGEN RECEPTOR NEGATIVE, UNSPECIFIED SITE OF BREAST (HCC): ICD-10-CM

## 2019-05-06 DIAGNOSIS — Z23 ENCOUNTER FOR VACCINATION: ICD-10-CM

## 2019-05-06 DIAGNOSIS — E03.9 ACQUIRED HYPOTHYROIDISM: ICD-10-CM

## 2019-05-06 DIAGNOSIS — C50.912 MALIGNANT NEOPLASM OF LEFT BREAST IN FEMALE, ESTROGEN RECEPTOR NEGATIVE, UNSPECIFIED SITE OF BREAST (HCC): ICD-10-CM

## 2019-05-06 DIAGNOSIS — Z00.00 ANNUAL PHYSICAL EXAM: Primary | ICD-10-CM

## 2019-05-06 DIAGNOSIS — E78.5 DYSLIPIDEMIA: ICD-10-CM

## 2019-05-06 DIAGNOSIS — Z11.59 NEED FOR HEPATITIS C SCREENING TEST: ICD-10-CM

## 2019-05-06 DIAGNOSIS — M17.0 OSTEOARTHRITIS OF BOTH KNEES, UNSPECIFIED OSTEOARTHRITIS TYPE: ICD-10-CM

## 2019-05-06 PROCEDURE — 3074F SYST BP LT 130 MM HG: CPT | Performed by: SPECIALIST

## 2019-05-06 PROCEDURE — 99214 OFFICE O/P EST MOD 30 MIN: CPT | Performed by: SPECIALIST

## 2019-05-06 PROCEDURE — 90715 TDAP VACCINE 7 YRS/> IM: CPT

## 2019-05-06 PROCEDURE — 99396 PREV VISIT EST AGE 40-64: CPT | Performed by: SPECIALIST

## 2019-05-06 PROCEDURE — 3078F DIAST BP <80 MM HG: CPT | Performed by: SPECIALIST

## 2019-05-06 PROCEDURE — 90471 IMMUNIZATION ADMIN: CPT

## 2019-05-16 ENCOUNTER — HOSPITAL ENCOUNTER (OUTPATIENT)
Dept: CT IMAGING | Facility: HOSPITAL | Age: 64
Discharge: HOME | End: 2019-05-16
Attending: INTERNAL MEDICINE
Payer: COMMERCIAL

## 2019-05-16 DIAGNOSIS — K62.5 HEMORRHAGE OF RECTUM AND ANUS: ICD-10-CM

## 2019-05-16 PROCEDURE — 74261 CT COLONOGRAPHY DX: CPT

## 2019-05-20 DIAGNOSIS — E03.9 ACQUIRED HYPOTHYROIDISM: ICD-10-CM

## 2019-05-20 DIAGNOSIS — Z23 ENCOUNTER FOR VACCINATION: Primary | ICD-10-CM

## 2019-05-20 RX ORDER — LEVOTHYROXINE SODIUM 0.12 MG/1
TABLET ORAL
Qty: 90 TABLET | Refills: 3 | Status: SHIPPED | OUTPATIENT
Start: 2019-05-20 | End: 2020-05-15

## 2019-06-11 ENCOUNTER — ANNUAL EXAM (OUTPATIENT)
Dept: GYNECOLOGY | Facility: CLINIC | Age: 64
End: 2019-06-11
Payer: COMMERCIAL

## 2019-06-11 DIAGNOSIS — Z01.419 ENCOUNTER FOR GYNECOLOGICAL EXAMINATION WITHOUT ABNORMAL FINDING: ICD-10-CM

## 2019-06-11 DIAGNOSIS — Z85.3 HISTORY OF LEFT BREAST CANCER: ICD-10-CM

## 2019-06-11 DIAGNOSIS — N95.2 ATROPHIC VAGINITIS: ICD-10-CM

## 2019-06-11 DIAGNOSIS — Z01.419 ENCOUNTER FOR GYNECOLOGICAL EXAMINATION WITH PAPANICOLAOU SMEAR OF CERVIX: ICD-10-CM

## 2019-06-11 DIAGNOSIS — Z12.31 ENCOUNTER FOR SCREENING MAMMOGRAM FOR MALIGNANT NEOPLASM OF BREAST: Primary | ICD-10-CM

## 2019-06-11 PROCEDURE — G0145 SCR C/V CYTO,THINLAYER,RESCR: HCPCS | Performed by: OBSTETRICS & GYNECOLOGY

## 2019-06-11 PROCEDURE — S0612 ANNUAL GYNECOLOGICAL EXAMINA: HCPCS | Performed by: OBSTETRICS & GYNECOLOGY

## 2019-06-11 RX ORDER — LOTEPREDNOL ETABONATE 5 MG/G
OINTMENT OPHTHALMIC
COMMUNITY
Start: 2019-05-14 | End: 2019-11-06

## 2019-06-19 LAB
LAB AP GYN PRIMARY INTERPRETATION: NORMAL
Lab: NORMAL

## 2019-08-26 ENCOUNTER — OFFICE VISIT (OUTPATIENT)
Dept: FAMILY MEDICINE CLINIC | Facility: CLINIC | Age: 64
End: 2019-08-26
Payer: COMMERCIAL

## 2019-08-26 VITALS
HEART RATE: 71 BPM | DIASTOLIC BLOOD PRESSURE: 90 MMHG | BODY MASS INDEX: 34.2 KG/M2 | SYSTOLIC BLOOD PRESSURE: 130 MMHG | OXYGEN SATURATION: 98 % | RESPIRATION RATE: 17 BRPM | TEMPERATURE: 98.1 F | WEIGHT: 193 LBS | HEIGHT: 63 IN

## 2019-08-26 DIAGNOSIS — L23.7 POISON IVY: Primary | ICD-10-CM

## 2019-08-26 PROCEDURE — 99212 OFFICE O/P EST SF 10 MIN: CPT | Performed by: SPECIALIST

## 2019-08-26 PROCEDURE — 1036F TOBACCO NON-USER: CPT | Performed by: SPECIALIST

## 2019-08-26 PROCEDURE — 3008F BODY MASS INDEX DOCD: CPT | Performed by: SPECIALIST

## 2019-08-26 RX ORDER — TRIAMCINOLONE ACETONIDE 1 MG/G
CREAM TOPICAL 2 TIMES DAILY
Qty: 30 G | Refills: 2 | Status: SHIPPED | OUTPATIENT
Start: 2019-08-26 | End: 2019-11-06

## 2019-08-26 RX ORDER — TRIAMCINOLONE ACETONIDE 1 MG/G
CREAM TOPICAL 2 TIMES DAILY
Qty: 30 G | Refills: 0 | Status: SHIPPED | OUTPATIENT
Start: 2019-08-26 | End: 2019-08-26 | Stop reason: SDUPTHER

## 2019-08-26 NOTE — PROGRESS NOTES
Assessment/Plan:    Patient seen in office today  During the visit I was accompanied by MA while physical exam was completed  No problem-specific Assessment & Plan notes found for this encounter  Problem List Items Addressed This Visit     None      Visit Diagnoses     Poison ivy    -  Primary    Relevant Medications    triamcinolone (KENALOG) 0 1 % cream            Subjective:        Patient ID: Osmany Lee is a 59 y o  female  61-year-old female with 4 arms showing evidence of linear vesicles history of exposure to poison ivy consistent with poison ivy dermatitis there is not any nothing between the fingers are in the face or body recommend triamcinolone cream twice a day   if not improved Medrol dose pack      The following portions of the patient's history were reviewed and updated as appropriate: allergies, current medications, past family history, past medical history, past social history, past surgical history and problem list     Review of Systems   Skin: Positive for rash  Linear vesicles consistent with exposure  poison ivy         Objective:      /90   Pulse 71   Temp 98 1 °F (36 7 °C)   Resp 17   Ht 5' 3" (1 6 m)   Wt 87 5 kg (193 lb)   SpO2 98%   BMI 34 19 kg/m²          Physical Exam   Cardiovascular: Normal rate and regular rhythm  Pulmonary/Chest: Breath sounds normal    Skin: Skin is dry  Rash ( rash both arms mostly on the left above the wrist linear vesicles consistent with poison ivy) noted

## 2019-08-26 NOTE — PATIENT INSTRUCTIONS
Use triamcinolone cream twice a day to forearms    If not improved within 4-5 days or getting worse cough for Medrol Dosepak

## 2019-10-04 ENCOUNTER — TRANSCRIBE ORDERS (OUTPATIENT)
Dept: ADMINISTRATIVE | Facility: HOSPITAL | Age: 64
End: 2019-10-04

## 2019-10-31 ENCOUNTER — OFFICE VISIT (OUTPATIENT)
Dept: FAMILY MEDICINE CLINIC | Facility: CLINIC | Age: 64
End: 2019-10-31
Payer: COMMERCIAL

## 2019-10-31 VITALS
HEIGHT: 63 IN | OXYGEN SATURATION: 95 % | HEART RATE: 81 BPM | WEIGHT: 195 LBS | SYSTOLIC BLOOD PRESSURE: 134 MMHG | DIASTOLIC BLOOD PRESSURE: 98 MMHG | BODY MASS INDEX: 34.55 KG/M2

## 2019-10-31 DIAGNOSIS — B02.39 HERPES ZOSTER BLEPHARITIS: Primary | ICD-10-CM

## 2019-10-31 PROCEDURE — 3008F BODY MASS INDEX DOCD: CPT | Performed by: INTERNAL MEDICINE

## 2019-10-31 PROCEDURE — 99213 OFFICE O/P EST LOW 20 MIN: CPT | Performed by: INTERNAL MEDICINE

## 2019-10-31 RX ORDER — VALACYCLOVIR HYDROCHLORIDE 1 G/1
1000 TABLET, FILM COATED ORAL 3 TIMES DAILY
Qty: 21 TABLET | Refills: 0 | Status: SHIPPED | OUTPATIENT
Start: 2019-10-31 | End: 2019-12-13

## 2019-10-31 NOTE — PROGRESS NOTES
Assessment/Plan:         Diagnoses and all orders for this visit:    Herpes zoster blepharitis  -     valACYclovir (VALTREX) 1,000 mg tablet; Take 1 tablet (1,000 mg total) by mouth 3 (three) times a day for 7 days    I am not sure if this is zoster infection or not  Differential diagnosis was discussed with the patient  I will start the patient on antiviral in the interest of protecting her vision  Patient happily agrees with it  Did encourage the patient to follow with her ophthalmologist     Subjective:      Patient ID: Adia Lomeli is a 59 y o  female  HPI  Patient is here for an acute visit complaining of pruritus and redness inner canthus right eye started about 2 days ago  This morning her eyes were open somewhat worsened did call her eye doctor worried that she had herpes zoster in her eye  She was evaluated by her ophthalmologist who could not find any evidence of dendritic lesions in her cornea I did ask her to follow up with me  Denies any fevers and chills myalgias  Denies any pain in her eye  She does suffer from chronic dry eye syndrome has excessive tearing  The following portions of the patient's history were reviewed and updated as appropriate: allergies, current medications, past medical history, past social history and problem list     Review of Systems      Objective:      /98 (BP Location: Left arm, Patient Position: Sitting, Cuff Size: Standard)   Pulse 81   Ht 5' 3" (1 6 m)   Wt 88 5 kg (195 lb)   SpO2 95%   BMI 34 54 kg/m²          Physical Exam   Eyes: Conjunctivae are normal    Right inner canthus erythema with small tiny blister  Conjunctiva is clear  No discharge  No ptosis   Lymphadenopathy:     She has cervical adenopathy

## 2019-11-01 ENCOUNTER — APPOINTMENT (OUTPATIENT)
Dept: LAB | Facility: MEDICAL CENTER | Age: 64
End: 2019-11-01
Payer: COMMERCIAL

## 2019-11-01 DIAGNOSIS — Z23 ENCOUNTER FOR VACCINATION: ICD-10-CM

## 2019-11-01 DIAGNOSIS — Z11.59 NEED FOR HEPATITIS C SCREENING TEST: ICD-10-CM

## 2019-11-01 DIAGNOSIS — I10 HYPERTENSION, UNSPECIFIED TYPE: ICD-10-CM

## 2019-11-01 DIAGNOSIS — E03.9 ACQUIRED HYPOTHYROIDISM: ICD-10-CM

## 2019-11-01 DIAGNOSIS — Z17.1 MALIGNANT NEOPLASM OF LEFT BREAST IN FEMALE, ESTROGEN RECEPTOR NEGATIVE, UNSPECIFIED SITE OF BREAST (HCC): ICD-10-CM

## 2019-11-01 DIAGNOSIS — M17.0 OSTEOARTHRITIS OF BOTH KNEES, UNSPECIFIED OSTEOARTHRITIS TYPE: ICD-10-CM

## 2019-11-01 DIAGNOSIS — C50.912 MALIGNANT NEOPLASM OF LEFT BREAST IN FEMALE, ESTROGEN RECEPTOR NEGATIVE, UNSPECIFIED SITE OF BREAST (HCC): ICD-10-CM

## 2019-11-01 DIAGNOSIS — E78.5 DYSLIPIDEMIA: ICD-10-CM

## 2019-11-01 LAB
ALBUMIN SERPL BCP-MCNC: 4 G/DL (ref 3.5–5)
ALP SERPL-CCNC: 87 U/L (ref 46–116)
ALT SERPL W P-5'-P-CCNC: 31 U/L (ref 12–78)
ANION GAP SERPL CALCULATED.3IONS-SCNC: 5 MMOL/L (ref 4–13)
AST SERPL W P-5'-P-CCNC: 20 U/L (ref 5–45)
BASOPHILS # BLD AUTO: 0.08 THOUSANDS/ΜL (ref 0–0.1)
BASOPHILS NFR BLD AUTO: 1 % (ref 0–1)
BILIRUB SERPL-MCNC: 0.44 MG/DL (ref 0.2–1)
BILIRUB UR QL STRIP: NEGATIVE
BUN SERPL-MCNC: 16 MG/DL (ref 5–25)
CALCIUM SERPL-MCNC: 9.5 MG/DL (ref 8.3–10.1)
CANCER AG27-29 SERPL-ACNC: 15.9 U/ML (ref 0–42.3)
CHLORIDE SERPL-SCNC: 103 MMOL/L (ref 100–108)
CHOLEST SERPL-MCNC: 220 MG/DL (ref 50–200)
CLARITY UR: CLEAR
CO2 SERPL-SCNC: 28 MMOL/L (ref 21–32)
COLOR UR: YELLOW
CREAT SERPL-MCNC: 0.85 MG/DL (ref 0.6–1.3)
EOSINOPHIL # BLD AUTO: 0.28 THOUSAND/ΜL (ref 0–0.61)
EOSINOPHIL NFR BLD AUTO: 3 % (ref 0–6)
ERYTHROCYTE [DISTWIDTH] IN BLOOD BY AUTOMATED COUNT: 14.5 % (ref 11.6–15.1)
GFR SERPL CREATININE-BSD FRML MDRD: 74 ML/MIN/1.73SQ M
GLUCOSE P FAST SERPL-MCNC: 90 MG/DL (ref 65–99)
GLUCOSE UR STRIP-MCNC: NEGATIVE MG/DL
HCT VFR BLD AUTO: 44.9 % (ref 34.8–46.1)
HCV AB SER QL: NORMAL
HDLC SERPL-MCNC: 47 MG/DL
HGB BLD-MCNC: 14.4 G/DL (ref 11.5–15.4)
HGB UR QL STRIP.AUTO: NEGATIVE
IMM GRANULOCYTES # BLD AUTO: 0.03 THOUSAND/UL (ref 0–0.2)
IMM GRANULOCYTES NFR BLD AUTO: 0 % (ref 0–2)
KETONES UR STRIP-MCNC: NEGATIVE MG/DL
LDLC SERPL CALC-MCNC: 147 MG/DL (ref 0–100)
LEUKOCYTE ESTERASE UR QL STRIP: NEGATIVE
LYMPHOCYTES # BLD AUTO: 3.37 THOUSANDS/ΜL (ref 0.6–4.47)
LYMPHOCYTES NFR BLD AUTO: 35 % (ref 14–44)
MCH RBC QN AUTO: 31.7 PG (ref 26.8–34.3)
MCHC RBC AUTO-ENTMCNC: 32.1 G/DL (ref 31.4–37.4)
MCV RBC AUTO: 99 FL (ref 82–98)
MONOCYTES # BLD AUTO: 1.17 THOUSAND/ΜL (ref 0.17–1.22)
MONOCYTES NFR BLD AUTO: 12 % (ref 4–12)
NEUTROPHILS # BLD AUTO: 4.7 THOUSANDS/ΜL (ref 1.85–7.62)
NEUTS SEG NFR BLD AUTO: 49 % (ref 43–75)
NITRITE UR QL STRIP: NEGATIVE
NONHDLC SERPL-MCNC: 173 MG/DL
NRBC BLD AUTO-RTO: 0 /100 WBCS
PH UR STRIP.AUTO: 7 [PH]
PLATELET # BLD AUTO: 418 THOUSANDS/UL (ref 149–390)
PMV BLD AUTO: 10.1 FL (ref 8.9–12.7)
POTASSIUM SERPL-SCNC: 4.4 MMOL/L (ref 3.5–5.3)
PROT SERPL-MCNC: 8.4 G/DL (ref 6.4–8.2)
PROT UR STRIP-MCNC: NEGATIVE MG/DL
RBC # BLD AUTO: 4.54 MILLION/UL (ref 3.81–5.12)
SODIUM SERPL-SCNC: 136 MMOL/L (ref 136–145)
SP GR UR STRIP.AUTO: 1.01 (ref 1–1.03)
T4 FREE SERPL-MCNC: 1.26 NG/DL (ref 0.76–1.46)
TRIGL SERPL-MCNC: 132 MG/DL
TSH SERPL DL<=0.05 MIU/L-ACNC: 3.2 UIU/ML (ref 0.36–3.74)
UROBILINOGEN UR QL STRIP.AUTO: 0.2 E.U./DL
WBC # BLD AUTO: 9.63 THOUSAND/UL (ref 4.31–10.16)

## 2019-11-01 PROCEDURE — 84443 ASSAY THYROID STIM HORMONE: CPT

## 2019-11-01 PROCEDURE — 86803 HEPATITIS C AB TEST: CPT

## 2019-11-01 PROCEDURE — 80053 COMPREHEN METABOLIC PANEL: CPT

## 2019-11-01 PROCEDURE — 36415 COLL VENOUS BLD VENIPUNCTURE: CPT

## 2019-11-01 PROCEDURE — 81003 URINALYSIS AUTO W/O SCOPE: CPT | Performed by: SPECIALIST

## 2019-11-01 PROCEDURE — 85025 COMPLETE CBC W/AUTO DIFF WBC: CPT

## 2019-11-01 PROCEDURE — 86300 IMMUNOASSAY TUMOR CA 15-3: CPT

## 2019-11-01 PROCEDURE — 80061 LIPID PANEL: CPT

## 2019-11-01 PROCEDURE — 84439 ASSAY OF FREE THYROXINE: CPT

## 2019-11-06 ENCOUNTER — OFFICE VISIT (OUTPATIENT)
Dept: FAMILY MEDICINE CLINIC | Facility: CLINIC | Age: 64
End: 2019-11-06
Payer: COMMERCIAL

## 2019-11-06 VITALS
TEMPERATURE: 98.2 F | BODY MASS INDEX: 34.55 KG/M2 | OXYGEN SATURATION: 95 % | HEART RATE: 76 BPM | SYSTOLIC BLOOD PRESSURE: 128 MMHG | HEIGHT: 63 IN | WEIGHT: 195 LBS | DIASTOLIC BLOOD PRESSURE: 80 MMHG

## 2019-11-06 DIAGNOSIS — M17.0 OSTEOARTHRITIS OF BOTH KNEES, UNSPECIFIED OSTEOARTHRITIS TYPE: ICD-10-CM

## 2019-11-06 DIAGNOSIS — Z17.1 MALIGNANT NEOPLASM OF LEFT BREAST IN FEMALE, ESTROGEN RECEPTOR NEGATIVE, UNSPECIFIED SITE OF BREAST (HCC): ICD-10-CM

## 2019-11-06 DIAGNOSIS — I10 HYPERTENSION, UNSPECIFIED TYPE: ICD-10-CM

## 2019-11-06 DIAGNOSIS — E03.9 ACQUIRED HYPOTHYROIDISM: Primary | ICD-10-CM

## 2019-11-06 DIAGNOSIS — C50.912 MALIGNANT NEOPLASM OF LEFT BREAST IN FEMALE, ESTROGEN RECEPTOR NEGATIVE, UNSPECIFIED SITE OF BREAST (HCC): ICD-10-CM

## 2019-11-06 DIAGNOSIS — E78.5 DYSLIPIDEMIA: ICD-10-CM

## 2019-11-06 PROCEDURE — 3074F SYST BP LT 130 MM HG: CPT | Performed by: SPECIALIST

## 2019-11-06 PROCEDURE — 3079F DIAST BP 80-89 MM HG: CPT | Performed by: SPECIALIST

## 2019-11-06 PROCEDURE — 99214 OFFICE O/P EST MOD 30 MIN: CPT | Performed by: SPECIALIST

## 2019-11-06 NOTE — PATIENT INSTRUCTIONS
Continue present thyroid medications    Do blood test prior to next visit in 6 months    Okay for shingles vaccine in approximately 1 month

## 2019-11-06 NOTE — PROGRESS NOTES
Assessment/Plan:    She had a small lesion near her right eye this was treated with Valtrex with good results she also saw her ophthalmologist    She is stable at present time    Laboratory data was reviewed    She goes to the eye doctor the dentist the oncologist and is scheduled for colonoscopy    She also sees Orthopedics for her chronic knee pain    She is will be doing a weight reduction program        Patient seen in office today  During the visit I was accompanied by MA while physical exam was completed  No problem-specific Assessment & Plan notes found for this encounter           Problem List Items Addressed This Visit        Endocrine    Hypothyroidism - Primary    Relevant Orders    CBC and differential    Comprehensive metabolic panel    Lipid panel    Magnesium    TSH, 3rd generation    T4, free    UA w Reflex to Microscopic w Reflex to Culture -Lab Collect       Other    Malignant neoplasm of left breast in female, estrogen receptor negative (HCC)    Relevant Orders    CBC and differential    Comprehensive metabolic panel    Lipid panel    Magnesium    TSH, 3rd generation    T4, free    UA w Reflex to Microscopic w Reflex to Culture -Lab Collect      Other Visit Diagnoses     Hypertension, unspecified type        Relevant Orders    CBC and differential    Comprehensive metabolic panel    Lipid panel    Magnesium    TSH, 3rd generation    T4, free    UA w Reflex to Microscopic w Reflex to Culture -Lab Collect    Dyslipidemia        Relevant Orders    CBC and differential    Comprehensive metabolic panel    Lipid panel    Magnesium    TSH, 3rd generation    T4, free    UA w Reflex to Microscopic w Reflex to Culture -Lab Collect    Osteoarthritis of both knees, unspecified osteoarthritis type        Relevant Orders    CBC and differential    Comprehensive metabolic panel    Lipid panel    Magnesium    TSH, 3rd generation    T4, free    UA w Reflex to Microscopic w Reflex to Culture -Lab Collect Subjective:     BMI Counseling: Body mass index is 34 54 kg/m²  Discussed the patient's BMI with her  The BMI is above normal  Nutrition recommendations include decreasing overall calorie intake  Depression Screening Follow-up Plan: Patient's depression screening was positive with a PHQ-2 score of   Their PHQ-9 score was   Clinically patient does not have depression  No treatment is required  Patient ID: Yanet Murphy is a 59 y o  female  52-year-old female who was seen by Dr Jacinto Ceja for pruritus and redness in the inner canthus of the right eye she was also seen by ophthalmologist who did not find any lesions of the cornea but because of the question of herpes zoster she was treated with Valtrex with good results    Insurance questionnaire was filled out she does have an elevated body weight and BMI and we talked about calorie count for 2 weeks and then reduce to calorie count 500 calories a day and increase exercise activities 3 times a week to 150 minutes within reason with her knee pains and osteoarthritis of her knees for which she sees orthopedic surgeons    All laboratory   was reviewed her triglycerides have precipitously dropped her HDL was increased LDL is increased to 147 and total cholesterol 220 sugars normal    Dose of thyroid is acceptable T4 and TSH are within normal range    She is being closely monitored by Oncology Ophthalmology she has appointment to see orthopedic surgeons regarding her osteoarthritis of her knees    She follows with the GI for colonoscopies and she gets routine dental care      The following portions of the patient's history were reviewed and updated as appropriate: allergies, past family history and past social history  Review of Systems   Constitutional: Negative for activity change, appetite change, chills, diaphoresis, fatigue and fever  HENT: Negative for congestion, sinus pressure, sinus pain and voice change      Respiratory: Negative for cough, chest tightness, shortness of breath and wheezing  Cardiovascular: Negative for chest pain, palpitations and leg swelling  Gastrointestinal: Negative for abdominal distention, abdominal pain and blood in stool  Genitourinary: Negative for difficulty urinating and dysuria  Musculoskeletal: Positive for arthralgias  Negative for back pain, gait problem, joint swelling, myalgias, neck pain and neck stiffness  Bilateral knee pain osteoarthritis   Skin: Negative  Neurological: Negative for dizziness, tremors, seizures, syncope, facial asymmetry, speech difficulty, weakness, light-headedness, numbness and headaches  Psychiatric/Behavioral: Negative for agitation and behavioral problems  Objective:      /80 (BP Location: Right arm, Patient Position: Sitting, Cuff Size: Large)   Pulse 76   Temp 98 2 °F (36 8 °C) (Tympanic)   Ht 5' 3" (1 6 m)   Wt 88 5 kg (195 lb)   SpO2 95%   BMI 34 54 kg/m²          Physical Exam   Constitutional: She is oriented to person, place, and time  She appears well-developed and well-nourished  No distress  HENT:   Mouth/Throat: No oropharyngeal exudate  Eyes: Right eye exhibits no discharge  Left eye exhibits no discharge  No scleral icterus  Neck: No JVD present  Cardiovascular: Normal rate, regular rhythm, normal heart sounds and intact distal pulses  No murmur heard  Pulmonary/Chest: Effort normal and breath sounds normal  She has no wheezes  She has no rales  Abdominal: Soft  Musculoskeletal: She exhibits no edema  Neurological: She is alert and oriented to person, place, and time  Skin: Skin is warm and dry  She is not diaphoretic  Psychiatric: She has a normal mood and affect

## 2019-11-12 ENCOUNTER — OFFICE VISIT (OUTPATIENT)
Dept: HEMATOLOGY ONCOLOGY | Facility: CLINIC | Age: 64
End: 2019-11-12
Payer: COMMERCIAL

## 2019-11-12 VITALS
RESPIRATION RATE: 16 BRPM | OXYGEN SATURATION: 97 % | BODY MASS INDEX: 34.73 KG/M2 | HEIGHT: 63 IN | HEART RATE: 81 BPM | WEIGHT: 196 LBS | TEMPERATURE: 98.7 F | SYSTOLIC BLOOD PRESSURE: 138 MMHG | DIASTOLIC BLOOD PRESSURE: 80 MMHG

## 2019-11-12 DIAGNOSIS — Z17.1 MALIGNANT NEOPLASM OF LEFT BREAST IN FEMALE, ESTROGEN RECEPTOR NEGATIVE, UNSPECIFIED SITE OF BREAST (HCC): Primary | ICD-10-CM

## 2019-11-12 DIAGNOSIS — R77.8 ELEVATED TOTAL PROTEIN: ICD-10-CM

## 2019-11-12 DIAGNOSIS — D75.839 THROMBOCYTOSIS: ICD-10-CM

## 2019-11-12 DIAGNOSIS — C50.912 MALIGNANT NEOPLASM OF LEFT BREAST IN FEMALE, ESTROGEN RECEPTOR NEGATIVE, UNSPECIFIED SITE OF BREAST (HCC): Primary | ICD-10-CM

## 2019-11-12 PROCEDURE — 99214 OFFICE O/P EST MOD 30 MIN: CPT | Performed by: INTERNAL MEDICINE

## 2019-11-12 NOTE — PROGRESS NOTES
11/12/2019    Margarita Range    She has been feeling well  She continues to take calcium 600 mg with vitamin-D daily and additional vitamin-D 1000 units daily  She is scheduled for bilateral total knee replacement to be done by Dr Taylor Carpenter on January 21, 2020 and then on February 25, 2020  Hematology/Oncology History:    1  Infiltrating ductal carcinoma, grade 3, 1 2 cm, ER negative, NV negative, HER-2/alexia negative and 0 7 cm, ER negative, NV negative, HER-2 negative, 3 sentinel lymph nodes with no metastatic tumor identified, pT1c,pN0, left total mastectomy and sentinel lymph node sampling  Dose dense Adriamycin and Cytoxan for 4 courses and then dose dense Taxol four courses from March 2014 to June 2014  Left breast implant reconstruction August 2014 and again in October 2014 and then TRAM flap reconstruction February 2015      2  Leukocytosis, mild, and thrombocytosis, mild, diagnosed in 1998  Bone marrow examination in January 1999 was minimally hypercellular with increased megakaryocytes and JAK2 V617F mutation not detected in April 2008 at which time flow cytometry on peripheral blood demonstrated presence of granulocytes, lymphocytes, and monocytes without diagnostic changes of leukemia or lymphoma      3  Colonoscopy on March 21, 2019 done by Dr Agusto Wakefield revealed 3 mm polyps at the hepatic flexure (colonic mucosa) and splenic flexure(colonic mucosa) and internal hemorrhoids  Review of systems:     Constitutional: She has been feeling well, she denies chills or sweats     HEENT: She denies nose bleeds   She denies oral cavity or throat soreness  Cardiovascular:  She denies chest pain, no edema  Respiratory:  She denies cough or dyspnea     GI:  Her appetite has been good   No abdominal pain  Bowel habits have been formed and regular       Musculoskeletal: She has chronic arthritis of the knees  She denies back pain     Neurologic: She denies headache or paresthesias  Junette Homans denies difficulty walking  Skin:  She denies rash     Psychiatric:  She denies emotional problems     Hematologic:  She denies easy bruising  Physical Examination:    Blood pressure 138/80, pulse 81, temperature 98 7 °F (37 1 °C), temperature source Tympanic, resp  rate 16, height 5' 3" (1 6 m), weight 88 9 kg (196 lb), SpO2 97 %, not currently breastfeeding  Body surface area is 1 92 meters squared  She appears well  EOMI   Oropharynx clear   No lymphadenopathy of the neck  No axillary lymphadenopathy   (Breast examination was not done today having been done by her breast surgeon Dr Fannie Leon on August 30, 2019 )   Lungs are clear bilaterally  Heart has regular rate and rhythm  No hepatic or splenic enlargement   No inguinal lymphadenopathy     No lower extremity edema  Normal gait and station   Neurological is grossly intact     Oriented x3, normal mood and affect      ECOG 0     Laboratory:    From November 1, 2019:  TSH is 3 2, CA 27 29 is 15 9, WBC 9 63, hemoglobin 14 4 with MCV 99, platelets 320, WBC differential neutrophils 49%, lymphs 35%, monos 12%, eos 3%, basos 1%, creatinine 0 85, calcium 9 5, AST 20, ALT 30, alk-phos 87, total protein 8 4, bili 0 44  PET/CT 02/25/14:  There are multiple normal sized lymph nodes in the neck, left supraclavicular area, axilla, retroperitoneal, and mesenteric areas are not hypermetabolic  There are no suspicious foci to suggest metastatic disease  CT colonoscopy without contrast from May 17, 2019:   There is thickened mucosal fold versus sessile polyp of the distal transverse colon which is suboptimally evaluated, limited evaluation demonstrates no significant abnormality liver, kidneys, pancreas or adrenal glands, there is markedly diminutive spleen with diffuse parenchymal calcifications, no ascites or lymphadenopathy, uterus enlarged with several leiomyomas including a dominant calcified leiomyoma in the anterior uterine fundus 7 1 x 5 9 cm     Assessment/Plan:    Elly Britton remains in clinical remission of infiltrating ductal carcinoma of left breast, pTicpN0, hormone receptor negative, HER-2/alexia negative, diagnosed November 2013  Bilateral 3D screening mammogram is planned for August 2019      There is top-normal to mildly elevated WBC and stable mild thrombocytosis for over 20 years  If available, report and images of PET-CT of February 25, 2014 are to be obtained and compared to the recent CT colonoscopy as to chronicity of the diminutive spleen  The patient was advised as to pneumococcal vaccination as she may be hyposplenic, she does not recollect ever undergoing pneumococcal vaccination  In addition there may be a role of meningococcus vaccination  Further evaluation of elevated total protein is to begin with SPEP  In the case of hypogammaglobulinemia the elevated total protein may be on the basis of chronic inflammation  In view of mild vitamin D insufficiency she is to continue vitamin-D 1000 units daily in addition to calcium 600 mg of vitamin-D daily      The patient aware to seek medical attention for lymph node enlargement, progressive joint pain of the knees or elsewhere, excessive fatigue, or if other new problems arise  Otherwise, I plan to see her again in 6 months      Today's office visit required 25 minutes, over 50% of the time was utilized to review diagnostic tests, impressions and recommendations

## 2019-11-18 ENCOUNTER — TELEPHONE (OUTPATIENT)
Dept: HEMATOLOGY ONCOLOGY | Facility: CLINIC | Age: 64
End: 2019-11-18

## 2019-11-18 NOTE — TELEPHONE ENCOUNTER
Spoke to Nickie at [address] [place] [address] ,to burn disc of PET scan done on 2/25/2014, to send to [facility]

## 2019-11-20 ENCOUNTER — APPOINTMENT (OUTPATIENT)
Dept: LAB | Facility: MEDICAL CENTER | Age: 64
End: 2019-11-20
Payer: COMMERCIAL

## 2019-11-20 DIAGNOSIS — Z11.59 NEED FOR HEPATITIS C SCREENING TEST: ICD-10-CM

## 2019-11-20 DIAGNOSIS — E03.9 ACQUIRED HYPOTHYROIDISM: ICD-10-CM

## 2019-11-20 DIAGNOSIS — M17.0 OSTEOARTHRITIS OF BOTH KNEES, UNSPECIFIED OSTEOARTHRITIS TYPE: ICD-10-CM

## 2019-11-20 DIAGNOSIS — E78.5 DYSLIPIDEMIA: ICD-10-CM

## 2019-11-20 DIAGNOSIS — R77.8 ELEVATED TOTAL PROTEIN: ICD-10-CM

## 2019-11-20 DIAGNOSIS — C50.912 MALIGNANT NEOPLASM OF LEFT BREAST IN FEMALE, ESTROGEN RECEPTOR NEGATIVE, UNSPECIFIED SITE OF BREAST (HCC): ICD-10-CM

## 2019-11-20 DIAGNOSIS — Z23 ENCOUNTER FOR VACCINATION: ICD-10-CM

## 2019-11-20 DIAGNOSIS — Z17.1 MALIGNANT NEOPLASM OF LEFT BREAST IN FEMALE, ESTROGEN RECEPTOR NEGATIVE, UNSPECIFIED SITE OF BREAST (HCC): ICD-10-CM

## 2019-11-20 DIAGNOSIS — I10 HYPERTENSION, UNSPECIFIED TYPE: ICD-10-CM

## 2019-11-20 LAB — HEMOCCULT STL QL IA: POSITIVE

## 2019-11-20 PROCEDURE — 36415 COLL VENOUS BLD VENIPUNCTURE: CPT

## 2019-11-20 PROCEDURE — G0328 FECAL BLOOD SCRN IMMUNOASSAY: HCPCS

## 2019-11-20 PROCEDURE — 84165 PROTEIN E-PHORESIS SERUM: CPT

## 2019-11-20 PROCEDURE — 84165 PROTEIN E-PHORESIS SERUM: CPT | Performed by: PATHOLOGY

## 2019-11-21 ENCOUNTER — TELEPHONE (OUTPATIENT)
Dept: FAMILY MEDICINE CLINIC | Facility: CLINIC | Age: 64
End: 2019-11-21

## 2019-11-22 LAB
ALBUMIN SERPL ELPH-MCNC: 4.8 G/DL (ref 3.5–5)
ALBUMIN SERPL ELPH-MCNC: 60 % (ref 52–65)
ALPHA1 GLOB SERPL ELPH-MCNC: 0.32 G/DL (ref 0.1–0.4)
ALPHA1 GLOB SERPL ELPH-MCNC: 4 % (ref 2.5–5)
ALPHA2 GLOB SERPL ELPH-MCNC: 0.81 G/DL (ref 0.4–1.2)
ALPHA2 GLOB SERPL ELPH-MCNC: 10.1 % (ref 7–13)
BETA GLOB ABNORMAL SERPL ELPH-MCNC: 0.4 G/DL (ref 0.4–0.8)
BETA1 GLOB SERPL ELPH-MCNC: 5 % (ref 5–13)
BETA2 GLOB SERPL ELPH-MCNC: 3.8 % (ref 2–8)
BETA2+GAMMA GLOB SERPL ELPH-MCNC: 0.3 G/DL (ref 0.2–0.5)
GAMMA GLOB ABNORMAL SERPL ELPH-MCNC: 1.37 G/DL (ref 0.5–1.6)
GAMMA GLOB SERPL ELPH-MCNC: 17.1 % (ref 12–22)
IGG/ALB SER: 1.5 {RATIO} (ref 1.1–1.8)
PROT PATTERN SERPL ELPH-IMP: NORMAL
PROT SERPL-MCNC: 8 G/DL (ref 6.4–8.2)

## 2019-11-25 ENCOUNTER — TELEPHONE (OUTPATIENT)
Dept: HEMATOLOGY ONCOLOGY | Facility: CLINIC | Age: 64
End: 2019-11-25

## 2019-11-25 NOTE — TELEPHONE ENCOUNTER
Patient called to inform Dr Thompson she had protein test done  Also  Patient states Dr Camacho was suppose to refer patient to Infectious Disease for small spline  Patient would like to know the status  Please call patient back at [phone]

## 2019-12-13 ENCOUNTER — OFFICE VISIT (OUTPATIENT)
Dept: FAMILY MEDICINE CLINIC | Facility: CLINIC | Age: 64
End: 2019-12-13
Payer: COMMERCIAL

## 2019-12-13 VITALS
HEIGHT: 63 IN | OXYGEN SATURATION: 96 % | HEART RATE: 86 BPM | SYSTOLIC BLOOD PRESSURE: 140 MMHG | WEIGHT: 193 LBS | DIASTOLIC BLOOD PRESSURE: 90 MMHG | BODY MASS INDEX: 34.2 KG/M2

## 2019-12-13 DIAGNOSIS — Z88.0 PENICILLIN ALLERGY: ICD-10-CM

## 2019-12-13 DIAGNOSIS — J01.00 ACUTE NON-RECURRENT MAXILLARY SINUSITIS: Primary | ICD-10-CM

## 2019-12-13 PROCEDURE — 1036F TOBACCO NON-USER: CPT | Performed by: INTERNAL MEDICINE

## 2019-12-13 PROCEDURE — 3008F BODY MASS INDEX DOCD: CPT | Performed by: INTERNAL MEDICINE

## 2019-12-13 PROCEDURE — 99213 OFFICE O/P EST LOW 20 MIN: CPT | Performed by: INTERNAL MEDICINE

## 2019-12-13 RX ORDER — DOXYCYCLINE HYCLATE 100 MG
100 TABLET ORAL 2 TIMES DAILY
Qty: 14 TABLET | Refills: 0 | Status: SHIPPED | OUTPATIENT
Start: 2019-12-13 | End: 2019-12-20

## 2019-12-13 NOTE — PROGRESS NOTES
Assessment/Plan:         Diagnoses and all orders for this visit:    Acute non-recurrent maxillary sinusitis  -     doxycycline hyclate (VIBRA-TABS) 100 mg tablet; Take 1 tablet (100 mg total) by mouth 2 (two) times a day for 7 days  Increase fluid intake, rest, saline gargles, NSAID/tylenol as tolerated  Follow up if symptoms getting worse or seek immediate medical help for emergency  Pt understands the plan  Penicillin allergy  -     Ambulatory referral to Allergy; Future        Subjective:      Patient ID: Armida Lynn is a 62 y o  female  Sinusitis   This is a new problem  The current episode started in the past 7 days  The problem is unchanged  Associated symptoms include congestion, coughing, sinus pressure and a sore throat  Pertinent negatives include no chills or shortness of breath  The following portions of the patient's history were reviewed and updated as appropriate: allergies, current medications, past family history, past medical history, past social history, past surgical history and problem list     Review of Systems   Constitutional: Negative for chills and fever  HENT: Positive for congestion, postnasal drip, sinus pressure and sore throat  Respiratory: Positive for cough  Negative for shortness of breath  Objective:      /90 (BP Location: Left arm, Patient Position: Sitting, Cuff Size: Standard)   Pulse 86   Ht 5' 3" (1 6 m)   Wt 87 5 kg (193 lb)   SpO2 96%   BMI 34 19 kg/m²          Physical Exam   Constitutional:   Sounds congested   HENT:   Pressure on palpation maxillary  and frontal sinuses bilaterally  Erythematous throat   Cardiovascular: Normal rate, regular rhythm and normal heart sounds  No murmur heard  Pulmonary/Chest: Effort normal and breath sounds normal  No stridor  No respiratory distress  She has no wheezes  Lymphadenopathy:     She has cervical adenopathy

## 2020-01-07 ENCOUNTER — CONSULT (OUTPATIENT)
Dept: FAMILY MEDICINE CLINIC | Facility: CLINIC | Age: 65
End: 2020-01-07
Payer: COMMERCIAL

## 2020-01-07 VITALS
OXYGEN SATURATION: 96 % | WEIGHT: 195.2 LBS | BODY MASS INDEX: 34.59 KG/M2 | TEMPERATURE: 98.9 F | RESPIRATION RATE: 18 BRPM | DIASTOLIC BLOOD PRESSURE: 90 MMHG | SYSTOLIC BLOOD PRESSURE: 140 MMHG | HEART RATE: 76 BPM | HEIGHT: 63 IN

## 2020-01-07 DIAGNOSIS — Z01.818 PRE-OPERATIVE CLEARANCE: Primary | ICD-10-CM

## 2020-01-07 PROCEDURE — 99214 OFFICE O/P EST MOD 30 MIN: CPT | Performed by: SPECIALIST

## 2020-01-07 NOTE — PROGRESS NOTES
Assessment    Patient seen prior to right total needed replacement   no cardio or pulmonary symptoms    She will be having preop blood work and she will also be having a stress test done    She has had stools positive for trace blood and has seen Dr Jenn Nathan gastroenterologist he did any colonoscopy    a virtual colonoscopy with CT scan    and recommended EGD    I asked the patient to speak to the orthopedic surgeon and Dr Jenn Nathan regarding anticoagulation postop for knee replacement which could be a problem if stools are consistently positive for trace blood    EGD may help if there is any issue with gastritis or potential site of bleeding              Patient seen in office today  During the visit I was accompanied by MA while physical exam was completed  No problem-specific Assessment & Plan notes found for this encounter  Problem List Items Addressed This Visit     None      Visit Diagnoses     Pre-operative clearance    -  Primary            Subjective:            Patient ID: Pamela Snell is a 62 y o  female  25-year-old female for right total knee replacement    medically there is no cardiopulmonary symptoms but she has had a stool positive for blood on 2 occasions had a colonoscopy a virtual colonoscopy with CT scan and is being followed by Dr Jenn Nathan gastroenterologists who also recommended EGD there would be a reason to do the EGD preop since patient will be on anticoagulation for her total knee replacement and could potentially worsen any bleeding    She has a history of left breast    triple negative    carcinoma treated with mastectomy and chemotherapy       The following portions of the patient's history were reviewed and updated as appropriate: current medications, past family history and past social history  Review of Systems   Constitutional: Negative for activity change, appetite change, chills, diaphoresis, fatigue and fever          The only activity change would be related to the right knee pain she also has left knee pain is well   HENT: Positive for congestion  Negative for sinus pressure, sinus pain and voice change  There is also been some upper respiratory congestion that has been there for weeks and may be allergic in nature not severe   Eyes: Negative for visual disturbance  Respiratory: Negative for cough, chest tightness, shortness of breath and wheezing  Cardiovascular: Negative for chest pain, palpitations and leg swelling  Gastrointestinal: Negative for abdominal distention and abdominal pain  Genitourinary: Negative for difficulty urinating  Musculoskeletal: Positive for arthralgias  Negative for back pain, gait problem, joint swelling, myalgias, neck pain and neck stiffness  Skin: Negative  Neurological: Negative for dizziness, tremors, seizures, syncope, facial asymmetry, speech difficulty, weakness, light-headedness, numbness and headaches  Psychiatric/Behavioral: Negative for agitation and behavioral problems  Objective:      /90 (BP Location: Left arm, Patient Position: Sitting, Cuff Size: Adult)   Pulse 76   Temp 98 9 °F (37 2 °C)   Resp 18   Ht 5' 3" (1 6 m)   Wt 88 5 kg (195 lb 3 2 oz)   SpO2 96%   BMI 34 58 kg/m²          Physical Exam   Constitutional: She is oriented to person, place, and time  She appears well-developed and well-nourished  No distress  Eyes: Pupils are equal, round, and reactive to light  EOM are normal  No scleral icterus  Neck: No JVD present  No thyromegaly present  Cardiovascular: Normal rate, regular rhythm, normal heart sounds and intact distal pulses  No murmur heard  Pulmonary/Chest: Effort normal and breath sounds normal  She has no wheezes  She has no rales  Abdominal: She exhibits no distension and no mass  There is no tenderness  There is no rebound and no guarding  Musculoskeletal: She exhibits no edema  Neurological: She is alert and oriented to person, place, and time  Skin: Skin is warm and dry  She is not diaphoretic  Psychiatric: She has a normal mood and affect

## 2020-01-07 NOTE — PATIENT INSTRUCTIONS
Await results of preop testing at [facility]    No active cardiopulmonary symptoms

## 2020-01-14 PROCEDURE — 88305 TISSUE EXAM BY PATHOLOGIST: CPT | Performed by: PATHOLOGY

## 2020-01-15 ENCOUNTER — LAB REQUISITION (OUTPATIENT)
Dept: LAB | Facility: HOSPITAL | Age: 65
End: 2020-01-15
Payer: COMMERCIAL

## 2020-01-15 DIAGNOSIS — K29.70 GASTRITIS, UNSPECIFIED, WITHOUT BLEEDING: ICD-10-CM

## 2020-01-15 DIAGNOSIS — K92.1 MELENA: ICD-10-CM

## 2020-01-16 ENCOUNTER — TELEPHONE (OUTPATIENT)
Dept: FAMILY MEDICINE CLINIC | Facility: CLINIC | Age: 65
End: 2020-01-16

## 2020-02-11 ENCOUNTER — OFFICE VISIT (OUTPATIENT)
Dept: FAMILY MEDICINE CLINIC | Facility: CLINIC | Age: 65
End: 2020-02-11
Payer: COMMERCIAL

## 2020-02-11 VITALS
DIASTOLIC BLOOD PRESSURE: 82 MMHG | BODY MASS INDEX: 34.55 KG/M2 | WEIGHT: 195 LBS | HEART RATE: 74 BPM | SYSTOLIC BLOOD PRESSURE: 120 MMHG | TEMPERATURE: 97.4 F | OXYGEN SATURATION: 98 % | HEIGHT: 63 IN

## 2020-02-11 DIAGNOSIS — E03.9 ACQUIRED HYPOTHYROIDISM: ICD-10-CM

## 2020-02-11 DIAGNOSIS — Z01.818 PRE-OPERATIVE CLEARANCE: Primary | ICD-10-CM

## 2020-02-11 DIAGNOSIS — I10 HYPERTENSION, UNSPECIFIED TYPE: ICD-10-CM

## 2020-02-11 DIAGNOSIS — M17.0 OSTEOARTHRITIS OF BOTH KNEES, UNSPECIFIED OSTEOARTHRITIS TYPE: ICD-10-CM

## 2020-02-11 PROCEDURE — 3008F BODY MASS INDEX DOCD: CPT | Performed by: SPECIALIST

## 2020-02-11 PROCEDURE — 99214 OFFICE O/P EST MOD 30 MIN: CPT | Performed by: SPECIALIST

## 2020-02-11 PROCEDURE — 3079F DIAST BP 80-89 MM HG: CPT | Performed by: SPECIALIST

## 2020-02-11 PROCEDURE — 1036F TOBACCO NON-USER: CPT | Performed by: SPECIALIST

## 2020-02-11 PROCEDURE — 3074F SYST BP LT 130 MM HG: CPT | Performed by: SPECIALIST

## 2020-02-11 NOTE — PROGRESS NOTES
Assessment/Plan:    Patient was seen today prior to her proposed left total knee replacement 2 weeks ago she had a right knee done without any difficulty she is receiving physical therapy in the plans are to go ahead and do the left knee    No active cardio or pulmonary symptoms    Patient seen in office today  During the visit I was accompanied by MA while physical exam was completed  No problem-specific Assessment & Plan notes found for this encounter  Problem List Items Addressed This Visit        Endocrine    Hypothyroidism      Other Visit Diagnoses     Pre-operative clearance    -  Primary    Hypertension, unspecified type        Osteoarthritis of both knees, unspecified osteoarthritis type                Subjective:            Patient ID: Ly Augustin is a 62 y o  female  59-year-old female who had her right total knee replacement 2 weeks ago thin 3 weeks she is receiving physical therapy using a walker knee but she has full range of motion   she does not have the pain in the right knee that she had prior to surgery      The following portions of the patient's history were reviewed and updated as appropriate: allergies, past family history and past social history  Review of Systems   Constitutional: Positive for activity change  Negative for appetite change, chills, diaphoresis, fatigue and fever  Activity change in relation to her surgery in her right knee and her DJD left knee   HENT: Negative for congestion, sinus pressure, sinus pain and voice change  Respiratory: Negative for cough, chest tightness, shortness of breath and wheezing  Cardiovascular: Negative for chest pain, palpitations and leg swelling  Gastrointestinal: Negative for abdominal distention  Genitourinary: Negative for difficulty urinating and dysuria  Musculoskeletal: Positive for arthralgias and gait problem  Negative for back pain, joint swelling, myalgias and neck pain          The gait problems relation to her surgery in the right knee and her DJD left knee   Skin: Negative  Neurological: Negative for dizziness, tremors, seizures, syncope, facial asymmetry, speech difficulty, weakness, light-headedness, numbness and headaches  Psychiatric/Behavioral: Negative for agitation  Objective:      /82 (BP Location: Left arm, Patient Position: Sitting, Cuff Size: Standard)   Pulse 74   Temp (!) 97 4 °F (36 3 °C) (Tympanic)   Ht 5' 3" (1 6 m)   Wt 88 5 kg (195 lb)   SpO2 98%   BMI 34 54 kg/m²          Physical Exam   Constitutional: She is oriented to person, place, and time  She appears well-developed and well-nourished  No distress  Eyes: Pupils are equal, round, and reactive to light  EOM are normal  Right eye exhibits no discharge  Left eye exhibits no discharge  No scleral icterus  Neck: No JVD present  Cardiovascular: Normal rate, regular rhythm and normal heart sounds  No murmur heard  Pulmonary/Chest: Effort normal and breath sounds normal    Abdominal: She exhibits no distension  Musculoskeletal: She exhibits no edema  Neurological: She is alert and oriented to person, place, and time  Skin: Skin is warm and dry  She is not diaphoretic  Psychiatric: She has a normal mood and affect

## 2020-02-21 DIAGNOSIS — I10 HYPERTENSION, UNSPECIFIED TYPE: ICD-10-CM

## 2020-02-21 RX ORDER — LISINOPRIL 20 MG/1
TABLET ORAL
Qty: 90 TABLET | Refills: 1 | Status: SHIPPED | OUTPATIENT
Start: 2020-02-21 | End: 2020-08-18

## 2020-03-30 ENCOUNTER — TELEPHONE (OUTPATIENT)
Dept: HEMATOLOGY ONCOLOGY | Facility: CLINIC | Age: 65
End: 2020-03-30

## 2020-03-30 NOTE — TELEPHONE ENCOUNTER
I called the patient and informed her that her appointment with Dr Jodee Villa on 5/12 had to be rescheduled to 5/14 @ 8:40 am to see Dr Adina Garcia due to Dr Jodee Villa no longer being with the practice  Patient verbalized agreement and understanding

## 2020-05-06 ENCOUNTER — TELEPHONE (OUTPATIENT)
Dept: HEMATOLOGY ONCOLOGY | Facility: CLINIC | Age: 65
End: 2020-05-06

## 2020-05-14 DIAGNOSIS — E03.9 ACQUIRED HYPOTHYROIDISM: ICD-10-CM

## 2020-05-15 ENCOUNTER — APPOINTMENT (OUTPATIENT)
Dept: LAB | Facility: MEDICAL CENTER | Age: 65
End: 2020-05-15
Payer: COMMERCIAL

## 2020-05-15 DIAGNOSIS — M17.0 OSTEOARTHRITIS OF BOTH KNEES, UNSPECIFIED OSTEOARTHRITIS TYPE: ICD-10-CM

## 2020-05-15 DIAGNOSIS — E03.9 ACQUIRED HYPOTHYROIDISM: ICD-10-CM

## 2020-05-15 DIAGNOSIS — E78.5 DYSLIPIDEMIA: ICD-10-CM

## 2020-05-15 DIAGNOSIS — C50.912 MALIGNANT NEOPLASM OF LEFT BREAST IN FEMALE, ESTROGEN RECEPTOR NEGATIVE, UNSPECIFIED SITE OF BREAST (HCC): ICD-10-CM

## 2020-05-15 DIAGNOSIS — Z17.1 MALIGNANT NEOPLASM OF LEFT BREAST IN FEMALE, ESTROGEN RECEPTOR NEGATIVE, UNSPECIFIED SITE OF BREAST (HCC): ICD-10-CM

## 2020-05-15 DIAGNOSIS — I10 HYPERTENSION, UNSPECIFIED TYPE: ICD-10-CM

## 2020-05-15 DIAGNOSIS — D75.839 THROMBOCYTOSIS: ICD-10-CM

## 2020-05-15 LAB
ALBUMIN SERPL BCP-MCNC: 3.9 G/DL (ref 3.5–5)
ALP SERPL-CCNC: 105 U/L (ref 46–116)
ALT SERPL W P-5'-P-CCNC: 29 U/L (ref 12–78)
ANION GAP SERPL CALCULATED.3IONS-SCNC: 1 MMOL/L (ref 4–13)
AST SERPL W P-5'-P-CCNC: 20 U/L (ref 5–45)
BASOPHILS # BLD AUTO: 0.07 THOUSANDS/ΜL (ref 0–0.1)
BASOPHILS NFR BLD AUTO: 1 % (ref 0–1)
BILIRUB SERPL-MCNC: 0.39 MG/DL (ref 0.2–1)
BUN SERPL-MCNC: 15 MG/DL (ref 5–25)
CALCIUM SERPL-MCNC: 9.6 MG/DL (ref 8.3–10.1)
CANCER AG27-29 SERPL-ACNC: 14.9 U/ML (ref 0–42.3)
CHLORIDE SERPL-SCNC: 104 MMOL/L (ref 100–108)
CHOLEST SERPL-MCNC: 204 MG/DL (ref 50–200)
CO2 SERPL-SCNC: 30 MMOL/L (ref 21–32)
CREAT SERPL-MCNC: 0.78 MG/DL (ref 0.6–1.3)
EOSINOPHIL # BLD AUTO: 0.15 THOUSAND/ΜL (ref 0–0.61)
EOSINOPHIL NFR BLD AUTO: 2 % (ref 0–6)
ERYTHROCYTE [DISTWIDTH] IN BLOOD BY AUTOMATED COUNT: 14.5 % (ref 11.6–15.1)
GFR SERPL CREATININE-BSD FRML MDRD: 81 ML/MIN/1.73SQ M
GLUCOSE P FAST SERPL-MCNC: 90 MG/DL (ref 65–99)
HCT VFR BLD AUTO: 42 % (ref 34.8–46.1)
HDLC SERPL-MCNC: 41 MG/DL
HGB BLD-MCNC: 13.3 G/DL (ref 11.5–15.4)
IMM GRANULOCYTES # BLD AUTO: 0.02 THOUSAND/UL (ref 0–0.2)
IMM GRANULOCYTES NFR BLD AUTO: 0 % (ref 0–2)
LDLC SERPL CALC-MCNC: 132 MG/DL (ref 0–100)
LYMPHOCYTES # BLD AUTO: 3.78 THOUSANDS/ΜL (ref 0.6–4.47)
LYMPHOCYTES NFR BLD AUTO: 43 % (ref 14–44)
MAGNESIUM SERPL-MCNC: 2.1 MG/DL (ref 1.6–2.6)
MCH RBC QN AUTO: 31.1 PG (ref 26.8–34.3)
MCHC RBC AUTO-ENTMCNC: 31.7 G/DL (ref 31.4–37.4)
MCV RBC AUTO: 98 FL (ref 82–98)
MONOCYTES # BLD AUTO: 1.07 THOUSAND/ΜL (ref 0.17–1.22)
MONOCYTES NFR BLD AUTO: 12 % (ref 4–12)
NEUTROPHILS # BLD AUTO: 3.64 THOUSANDS/ΜL (ref 1.85–7.62)
NEUTS SEG NFR BLD AUTO: 42 % (ref 43–75)
NONHDLC SERPL-MCNC: 163 MG/DL
NRBC BLD AUTO-RTO: 0 /100 WBCS
PLATELET # BLD AUTO: 425 THOUSANDS/UL (ref 149–390)
PMV BLD AUTO: 10.1 FL (ref 8.9–12.7)
POTASSIUM SERPL-SCNC: 4.7 MMOL/L (ref 3.5–5.3)
PROT SERPL-MCNC: 7.8 G/DL (ref 6.4–8.2)
RBC # BLD AUTO: 4.28 MILLION/UL (ref 3.81–5.12)
SODIUM SERPL-SCNC: 135 MMOL/L (ref 136–145)
T4 FREE SERPL-MCNC: 1.08 NG/DL (ref 0.76–1.46)
TRIGL SERPL-MCNC: 156 MG/DL
TSH SERPL DL<=0.05 MIU/L-ACNC: 0.46 UIU/ML (ref 0.36–3.74)
WBC # BLD AUTO: 8.73 THOUSAND/UL (ref 4.31–10.16)

## 2020-05-15 PROCEDURE — 80053 COMPREHEN METABOLIC PANEL: CPT

## 2020-05-15 PROCEDURE — 84439 ASSAY OF FREE THYROXINE: CPT

## 2020-05-15 PROCEDURE — 86300 IMMUNOASSAY TUMOR CA 15-3: CPT

## 2020-05-15 PROCEDURE — 85025 COMPLETE CBC W/AUTO DIFF WBC: CPT

## 2020-05-15 PROCEDURE — 36415 COLL VENOUS BLD VENIPUNCTURE: CPT

## 2020-05-15 PROCEDURE — 83735 ASSAY OF MAGNESIUM: CPT

## 2020-05-15 PROCEDURE — 80061 LIPID PANEL: CPT

## 2020-05-15 PROCEDURE — 84443 ASSAY THYROID STIM HORMONE: CPT

## 2020-05-15 RX ORDER — LEVOTHYROXINE SODIUM 0.12 MG/1
TABLET ORAL
Qty: 90 TABLET | Refills: 3 | Status: SHIPPED | OUTPATIENT
Start: 2020-05-15 | End: 2021-05-20

## 2020-05-20 ENCOUNTER — TELEPHONE (OUTPATIENT)
Dept: FAMILY MEDICINE CLINIC | Facility: CLINIC | Age: 65
End: 2020-05-20

## 2020-05-29 RX ORDER — METHOCARBAMOL 500 MG/1
500 TABLET, FILM COATED ORAL EVERY 6 HOURS PRN
COMMUNITY
Start: 2020-03-23 | End: 2020-06-04 | Stop reason: ALTCHOICE

## 2020-05-29 RX ORDER — OXYCODONE HYDROCHLORIDE 5 MG/1
TABLET ORAL
COMMUNITY
Start: 2020-03-23 | End: 2020-06-04 | Stop reason: ALTCHOICE

## 2020-06-04 ENCOUNTER — OFFICE VISIT (OUTPATIENT)
Dept: FAMILY MEDICINE CLINIC | Facility: CLINIC | Age: 65
End: 2020-06-04
Payer: COMMERCIAL

## 2020-06-04 VITALS
TEMPERATURE: 97.7 F | DIASTOLIC BLOOD PRESSURE: 80 MMHG | HEIGHT: 63 IN | OXYGEN SATURATION: 98 % | HEART RATE: 76 BPM | BODY MASS INDEX: 34.73 KG/M2 | SYSTOLIC BLOOD PRESSURE: 124 MMHG | WEIGHT: 196 LBS

## 2020-06-04 DIAGNOSIS — I10 HYPERTENSION, UNSPECIFIED TYPE: ICD-10-CM

## 2020-06-04 DIAGNOSIS — Z00.00 ANNUAL PHYSICAL EXAM: Primary | ICD-10-CM

## 2020-06-04 DIAGNOSIS — E03.9 ACQUIRED HYPOTHYROIDISM: ICD-10-CM

## 2020-06-04 PROCEDURE — 99396 PREV VISIT EST AGE 40-64: CPT | Performed by: INTERNAL MEDICINE

## 2020-06-04 PROCEDURE — 3079F DIAST BP 80-89 MM HG: CPT | Performed by: INTERNAL MEDICINE

## 2020-06-04 PROCEDURE — 3008F BODY MASS INDEX DOCD: CPT | Performed by: INTERNAL MEDICINE

## 2020-06-04 PROCEDURE — 3074F SYST BP LT 130 MM HG: CPT | Performed by: INTERNAL MEDICINE

## 2020-08-18 DIAGNOSIS — I10 HYPERTENSION, UNSPECIFIED TYPE: ICD-10-CM

## 2020-08-18 RX ORDER — LISINOPRIL 20 MG/1
TABLET ORAL
Qty: 90 TABLET | Refills: 1 | Status: SHIPPED | OUTPATIENT
Start: 2020-08-18 | End: 2021-02-19

## 2020-10-30 ENCOUNTER — TELEMEDICINE (OUTPATIENT)
Dept: FAMILY MEDICINE CLINIC | Facility: CLINIC | Age: 65
End: 2020-10-30
Payer: COMMERCIAL

## 2020-10-30 VITALS — TEMPERATURE: 97.8 F

## 2020-10-30 DIAGNOSIS — Z20.828 EXPOSURE TO SARS-ASSOCIATED CORONAVIRUS: ICD-10-CM

## 2020-10-30 DIAGNOSIS — Z20.828 EXPOSURE TO SARS-ASSOCIATED CORONAVIRUS: Primary | ICD-10-CM

## 2020-10-30 PROCEDURE — 99214 OFFICE O/P EST MOD 30 MIN: CPT | Performed by: INTERNAL MEDICINE

## 2020-10-30 PROCEDURE — U0003 INFECTIOUS AGENT DETECTION BY NUCLEIC ACID (DNA OR RNA); SEVERE ACUTE RESPIRATORY SYNDROME CORONAVIRUS 2 (SARS-COV-2) (CORONAVIRUS DISEASE [COVID-19]), AMPLIFIED PROBE TECHNIQUE, MAKING USE OF HIGH THROUGHPUT TECHNOLOGIES AS DESCRIBED BY CMS-2020-01-R: HCPCS | Performed by: INTERNAL MEDICINE

## 2020-10-31 LAB — SARS-COV-2 RNA SPEC QL NAA+PROBE: NOT DETECTED

## 2020-11-04 ENCOUNTER — TELEPHONE (OUTPATIENT)
Dept: HEMATOLOGY ONCOLOGY | Facility: CLINIC | Age: 65
End: 2020-11-04

## 2020-11-05 ENCOUNTER — OFFICE VISIT (OUTPATIENT)
Dept: HEMATOLOGY ONCOLOGY | Facility: CLINIC | Age: 65
End: 2020-11-05
Payer: COMMERCIAL

## 2020-11-05 VITALS
RESPIRATION RATE: 18 BRPM | WEIGHT: 198 LBS | HEART RATE: 74 BPM | DIASTOLIC BLOOD PRESSURE: 84 MMHG | HEIGHT: 63 IN | TEMPERATURE: 98.9 F | SYSTOLIC BLOOD PRESSURE: 142 MMHG | OXYGEN SATURATION: 97 % | BODY MASS INDEX: 35.08 KG/M2

## 2020-11-05 DIAGNOSIS — Z17.1 MALIGNANT NEOPLASM OF LEFT BREAST IN FEMALE, ESTROGEN RECEPTOR NEGATIVE, UNSPECIFIED SITE OF BREAST (HCC): Primary | ICD-10-CM

## 2020-11-05 DIAGNOSIS — C50.912 MALIGNANT NEOPLASM OF LEFT BREAST IN FEMALE, ESTROGEN RECEPTOR NEGATIVE, UNSPECIFIED SITE OF BREAST (HCC): Primary | ICD-10-CM

## 2020-11-05 DIAGNOSIS — D75.839 THROMBOCYTOSIS: ICD-10-CM

## 2020-11-05 PROCEDURE — 3008F BODY MASS INDEX DOCD: CPT | Performed by: INTERNAL MEDICINE

## 2020-11-05 PROCEDURE — 1036F TOBACCO NON-USER: CPT | Performed by: INTERNAL MEDICINE

## 2020-11-05 PROCEDURE — 3077F SYST BP >= 140 MM HG: CPT | Performed by: INTERNAL MEDICINE

## 2020-11-05 PROCEDURE — 3079F DIAST BP 80-89 MM HG: CPT | Performed by: INTERNAL MEDICINE

## 2020-11-05 PROCEDURE — 99215 OFFICE O/P EST HI 40 MIN: CPT | Performed by: INTERNAL MEDICINE

## 2020-11-05 RX ORDER — OMEPRAZOLE 20 MG/1
20 CAPSULE, DELAYED RELEASE ORAL DAILY
COMMUNITY
End: 2021-09-24

## 2020-12-04 ENCOUNTER — OFFICE VISIT (OUTPATIENT)
Dept: FAMILY MEDICINE CLINIC | Facility: CLINIC | Age: 65
End: 2020-12-04
Payer: COMMERCIAL

## 2020-12-04 VITALS
DIASTOLIC BLOOD PRESSURE: 74 MMHG | WEIGHT: 200 LBS | BODY MASS INDEX: 35.44 KG/M2 | SYSTOLIC BLOOD PRESSURE: 132 MMHG | HEART RATE: 79 BPM | TEMPERATURE: 98.6 F | HEIGHT: 63 IN | OXYGEN SATURATION: 97 %

## 2020-12-04 DIAGNOSIS — D75.839 THROMBOCYTOSIS: ICD-10-CM

## 2020-12-04 DIAGNOSIS — E78.5 HYPERLIPIDEMIA, UNSPECIFIED HYPERLIPIDEMIA TYPE: ICD-10-CM

## 2020-12-04 DIAGNOSIS — I10 HYPERTENSION, UNSPECIFIED TYPE: ICD-10-CM

## 2020-12-04 DIAGNOSIS — E03.8 OTHER SPECIFIED HYPOTHYROIDISM: Primary | ICD-10-CM

## 2020-12-04 PROCEDURE — 1036F TOBACCO NON-USER: CPT | Performed by: INTERNAL MEDICINE

## 2020-12-04 PROCEDURE — 99214 OFFICE O/P EST MOD 30 MIN: CPT | Performed by: INTERNAL MEDICINE

## 2020-12-11 ENCOUNTER — ANNUAL EXAM (OUTPATIENT)
Dept: GYNECOLOGY | Facility: CLINIC | Age: 65
End: 2020-12-11
Payer: COMMERCIAL

## 2020-12-11 VITALS
HEIGHT: 63 IN | BODY MASS INDEX: 35.44 KG/M2 | WEIGHT: 200 LBS | DIASTOLIC BLOOD PRESSURE: 82 MMHG | HEART RATE: 72 BPM | SYSTOLIC BLOOD PRESSURE: 136 MMHG

## 2020-12-11 DIAGNOSIS — Z01.419 ENCOUNTER FOR GYNECOLOGICAL EXAMINATION WITH PAPANICOLAOU SMEAR OF CERVIX: ICD-10-CM

## 2020-12-11 DIAGNOSIS — C50.412 MALIGNANT NEOPLASM OF UPPER-OUTER QUADRANT OF LEFT BREAST IN FEMALE, ESTROGEN RECEPTOR NEGATIVE (HCC): ICD-10-CM

## 2020-12-11 DIAGNOSIS — Z01.419 ENCOUNTER FOR GYNECOLOGICAL EXAMINATION WITHOUT ABNORMAL FINDING: Primary | ICD-10-CM

## 2020-12-11 DIAGNOSIS — Z17.1 MALIGNANT NEOPLASM OF UPPER-OUTER QUADRANT OF LEFT BREAST IN FEMALE, ESTROGEN RECEPTOR NEGATIVE (HCC): ICD-10-CM

## 2020-12-11 PROCEDURE — 76977 US BONE DENSITY MEASURE: CPT | Performed by: OBSTETRICS & GYNECOLOGY

## 2020-12-11 PROCEDURE — S0612 ANNUAL GYNECOLOGICAL EXAMINA: HCPCS | Performed by: OBSTETRICS & GYNECOLOGY

## 2020-12-11 PROCEDURE — 3008F BODY MASS INDEX DOCD: CPT | Performed by: INTERNAL MEDICINE

## 2020-12-11 PROCEDURE — G0145 SCR C/V CYTO,THINLAYER,RESCR: HCPCS | Performed by: OBSTETRICS & GYNECOLOGY

## 2020-12-15 LAB
LAB AP GYN PRIMARY INTERPRETATION: NORMAL
Lab: NORMAL

## 2020-12-22 ENCOUNTER — LAB (OUTPATIENT)
Dept: LAB | Facility: MEDICAL CENTER | Age: 65
End: 2020-12-22
Payer: COMMERCIAL

## 2020-12-22 LAB
ALBUMIN SERPL BCP-MCNC: 3.8 G/DL (ref 3.5–5)
ALP SERPL-CCNC: 114 U/L (ref 46–116)
ALT SERPL W P-5'-P-CCNC: 35 U/L (ref 12–78)
ANION GAP SERPL CALCULATED.3IONS-SCNC: 3 MMOL/L (ref 4–13)
AST SERPL W P-5'-P-CCNC: 22 U/L (ref 5–45)
BASOPHILS # BLD AUTO: 0.07 THOUSANDS/ΜL (ref 0–0.1)
BASOPHILS NFR BLD AUTO: 1 % (ref 0–1)
BILIRUB SERPL-MCNC: 0.46 MG/DL (ref 0.2–1)
BUN SERPL-MCNC: 19 MG/DL (ref 5–25)
CALCIUM SERPL-MCNC: 9.7 MG/DL (ref 8.3–10.1)
CHLORIDE SERPL-SCNC: 104 MMOL/L (ref 100–108)
CHOLEST SERPL-MCNC: 219 MG/DL (ref 50–200)
CO2 SERPL-SCNC: 30 MMOL/L (ref 21–32)
CREAT SERPL-MCNC: 0.85 MG/DL (ref 0.6–1.3)
EOSINOPHIL # BLD AUTO: 0.22 THOUSAND/ΜL (ref 0–0.61)
EOSINOPHIL NFR BLD AUTO: 2 % (ref 0–6)
ERYTHROCYTE [DISTWIDTH] IN BLOOD BY AUTOMATED COUNT: 14.4 % (ref 11.6–15.1)
GFR SERPL CREATININE-BSD FRML MDRD: 73 ML/MIN/1.73SQ M
GLUCOSE P FAST SERPL-MCNC: 89 MG/DL (ref 65–99)
HCT VFR BLD AUTO: 43.5 % (ref 34.8–46.1)
HDLC SERPL-MCNC: 39 MG/DL
HGB BLD-MCNC: 14.2 G/DL (ref 11.5–15.4)
IMM GRANULOCYTES # BLD AUTO: 0.02 THOUSAND/UL (ref 0–0.2)
IMM GRANULOCYTES NFR BLD AUTO: 0 % (ref 0–2)
LDLC SERPL CALC-MCNC: 147 MG/DL (ref 0–100)
LYMPHOCYTES # BLD AUTO: 3.63 THOUSANDS/ΜL (ref 0.6–4.47)
LYMPHOCYTES NFR BLD AUTO: 37 % (ref 14–44)
MCH RBC QN AUTO: 31.6 PG (ref 26.8–34.3)
MCHC RBC AUTO-ENTMCNC: 32.6 G/DL (ref 31.4–37.4)
MCV RBC AUTO: 97 FL (ref 82–98)
MONOCYTES # BLD AUTO: 1.07 THOUSAND/ΜL (ref 0.17–1.22)
MONOCYTES NFR BLD AUTO: 11 % (ref 4–12)
NEUTROPHILS # BLD AUTO: 4.87 THOUSANDS/ΜL (ref 1.85–7.62)
NEUTS SEG NFR BLD AUTO: 49 % (ref 43–75)
NONHDLC SERPL-MCNC: 180 MG/DL
NRBC BLD AUTO-RTO: 0 /100 WBCS
PLATELET # BLD AUTO: 429 THOUSANDS/UL (ref 149–390)
PMV BLD AUTO: 10.1 FL (ref 8.9–12.7)
POTASSIUM SERPL-SCNC: 4.5 MMOL/L (ref 3.5–5.3)
PROT SERPL-MCNC: 7.6 G/DL (ref 6.4–8.2)
RBC # BLD AUTO: 4.49 MILLION/UL (ref 3.81–5.12)
SODIUM SERPL-SCNC: 137 MMOL/L (ref 136–145)
T4 FREE SERPL-MCNC: 0.96 NG/DL (ref 0.76–1.46)
TRIGL SERPL-MCNC: 167 MG/DL
TSH SERPL DL<=0.05 MIU/L-ACNC: 4.02 UIU/ML (ref 0.36–3.74)
WBC # BLD AUTO: 9.88 THOUSAND/UL (ref 4.31–10.16)

## 2020-12-22 PROCEDURE — 84443 ASSAY THYROID STIM HORMONE: CPT | Performed by: INTERNAL MEDICINE

## 2020-12-22 PROCEDURE — 85025 COMPLETE CBC W/AUTO DIFF WBC: CPT | Performed by: INTERNAL MEDICINE

## 2020-12-22 PROCEDURE — 84439 ASSAY OF FREE THYROXINE: CPT | Performed by: INTERNAL MEDICINE

## 2020-12-22 PROCEDURE — 80053 COMPREHEN METABOLIC PANEL: CPT | Performed by: INTERNAL MEDICINE

## 2020-12-22 PROCEDURE — 80061 LIPID PANEL: CPT | Performed by: INTERNAL MEDICINE

## 2020-12-22 PROCEDURE — 36415 COLL VENOUS BLD VENIPUNCTURE: CPT | Performed by: INTERNAL MEDICINE

## 2020-12-23 DIAGNOSIS — E78.5 HYPERLIPIDEMIA, UNSPECIFIED HYPERLIPIDEMIA TYPE: Primary | ICD-10-CM

## 2021-01-28 ENCOUNTER — TELEPHONE (OUTPATIENT)
Dept: FAMILY MEDICINE CLINIC | Facility: CLINIC | Age: 66
End: 2021-01-28

## 2021-02-19 DIAGNOSIS — I10 HYPERTENSION, UNSPECIFIED TYPE: ICD-10-CM

## 2021-02-19 RX ORDER — LISINOPRIL 20 MG/1
TABLET ORAL
Qty: 90 TABLET | Refills: 1 | Status: SHIPPED | OUTPATIENT
Start: 2021-02-19 | End: 2022-02-11

## 2021-02-19 RX ORDER — LISINOPRIL 20 MG/1
TABLET ORAL
Qty: 90 TABLET | Refills: 1 | Status: SHIPPED | OUTPATIENT
Start: 2021-02-19 | End: 2021-08-03

## 2021-03-10 DIAGNOSIS — Z23 ENCOUNTER FOR IMMUNIZATION: ICD-10-CM

## 2021-05-20 DIAGNOSIS — E03.9 ACQUIRED HYPOTHYROIDISM: ICD-10-CM

## 2021-05-20 RX ORDER — LEVOTHYROXINE SODIUM 0.12 MG/1
TABLET ORAL
Qty: 90 TABLET | Refills: 3 | Status: SHIPPED | OUTPATIENT
Start: 2021-05-20 | End: 2022-01-26 | Stop reason: SDUPTHER

## 2021-06-18 ENCOUNTER — TELEPHONE (OUTPATIENT)
Dept: SURGICAL ONCOLOGY | Facility: CLINIC | Age: 66
End: 2021-06-18

## 2021-06-18 NOTE — TELEPHONE ENCOUNTER
New Patient Breast Form   Patient Details:     Maru Silveira     1955     [id number]     Background Information:   [facility] [place] starts by opening a telephone encounter and gathering the following information   Who is calling to schedule and relationship? Patient   Referring Provider Dr Milena Griffith pt   To which speciality is the referral?  Surgical Oncology   Reason for Visit? History Of   Tumor Type? Breast Cancer 2013 diagnosis w/surg  In 2014   Is this Cancer or Non-Cancer? Cancer   Is there a confimed diagnosis from biopsy/tissue reviewed by Pathology? If no, why?  yes   Date of Tissue Diagnosis (If done outside of [facility] please obtain report and slides) 2013   Is patient aware of diagnosis, and who made them aware? If no, why? Yes  [facility]   If no tissue diagnosis, please stop and discuss with Navigator prior to scheduling   When was the diagnosis made? 2013   Were outside slides requested? If no, why? Need records   If biopsy done externally, obtain reports and slides for internal review   Have you had any imaging or labs done? If no, why?  yes   If YES, when and  where was the blood work and imaging done? [facility]   If outside of [facility] obtain records   Was the patient told to bring a disk? If no why? yes   Are records in EPIC? If no, why? No  [facility] records   Is there a personal history of cancer? If no, why? yes   If YES please list type and YR diagnosed 2013   If patient has a prior history of breast cancer were old records obtained? If no, why? Pt is requesting   Have you ever had genetic testing done for breast cancer? If so, can you please bring a copy to appointment for timely treatment planning  If no, why? Yes  BRCA done in 2013 and it was negative   Is there a family history of cancer? If no, why? yes   If YES please list type Dad had bone cancer   Scheduling Information:   Preferred [facility]   Are you willing to see another provider to be seen sooner? If no, why?  No Pt wants [phone]   Are there any days the patient cannot be seen? If no, why? no   How was New Patient Packet Sent? No appt scheduled yet   Miscellaneous: gave pt fax # and will await records prior to scheduling  After completing the above information, please route to finance, nurse navigation and clinical trials for review

## 2021-06-23 ENCOUNTER — TELEPHONE (OUTPATIENT)
Dept: SURGICAL ONCOLOGY | Facility: CLINIC | Age: 66
End: 2021-06-23

## 2021-06-23 NOTE — TELEPHONE ENCOUNTER
Called pt as a reminder that "to date" we still have not received any records from Dr Leonard Tirado office  Had to leave that message on her voice mail

## 2021-07-21 NOTE — TELEPHONE ENCOUNTER
Patient called to follow up on a new patient appt request, states that [facility] sent over her records, best call back [phone]

## 2021-08-03 ENCOUNTER — OFFICE VISIT (OUTPATIENT)
Dept: FAMILY MEDICINE CLINIC | Facility: CLINIC | Age: 66
End: 2021-08-03
Payer: COMMERCIAL

## 2021-08-03 VITALS
TEMPERATURE: 99.6 F | BODY MASS INDEX: 35.79 KG/M2 | WEIGHT: 202 LBS | SYSTOLIC BLOOD PRESSURE: 140 MMHG | HEIGHT: 63 IN | DIASTOLIC BLOOD PRESSURE: 82 MMHG

## 2021-08-03 DIAGNOSIS — E78.5 HYPERLIPIDEMIA, UNSPECIFIED HYPERLIPIDEMIA TYPE: ICD-10-CM

## 2021-08-03 DIAGNOSIS — C44.91 BASAL CELL CARCINOMA (BCC), UNSPECIFIED SITE: Primary | ICD-10-CM

## 2021-08-03 DIAGNOSIS — Z00.00 ANNUAL PHYSICAL EXAM: ICD-10-CM

## 2021-08-03 DIAGNOSIS — K63.5 POLYP OF COLON, UNSPECIFIED PART OF COLON, UNSPECIFIED TYPE: ICD-10-CM

## 2021-08-03 DIAGNOSIS — Z13.21 ENCOUNTER FOR VITAMIN DEFICIENCY SCREENING: ICD-10-CM

## 2021-08-03 DIAGNOSIS — I10 HYPERTENSION, UNSPECIFIED TYPE: ICD-10-CM

## 2021-08-03 DIAGNOSIS — Z13.89 SCREENING FOR GENITOURINARY CONDITION: ICD-10-CM

## 2021-08-03 DIAGNOSIS — E03.8 OTHER SPECIFIED HYPOTHYROIDISM: ICD-10-CM

## 2021-08-03 PROCEDURE — 3008F BODY MASS INDEX DOCD: CPT | Performed by: INTERNAL MEDICINE

## 2021-08-03 PROCEDURE — 1036F TOBACCO NON-USER: CPT | Performed by: INTERNAL MEDICINE

## 2021-08-03 PROCEDURE — 3077F SYST BP >= 140 MM HG: CPT | Performed by: INTERNAL MEDICINE

## 2021-08-03 PROCEDURE — 99396 PREV VISIT EST AGE 40-64: CPT | Performed by: INTERNAL MEDICINE

## 2021-08-03 PROCEDURE — 3079F DIAST BP 80-89 MM HG: CPT | Performed by: INTERNAL MEDICINE

## 2021-08-03 PROCEDURE — 3725F SCREEN DEPRESSION PERFORMED: CPT | Performed by: INTERNAL MEDICINE

## 2021-08-03 NOTE — PROGRESS NOTES
Assessment/Plan:         Diagnoses and all orders for this visit:      Annual physical exam    See discussion above  Other specified hypothyroidism  -     TSH, 3rd generation with Free T4 reflex    Hypertension, unspecified type  -     CBC and differential  -     Comprehensive metabolic panel    Hyperlipidemia, unspecified hyperlipidemia type  -     Lipid panel    Encounter for vitamin deficiency screening  -     Vitamin D 25 hydroxy    Screening for genitourinary condition  -     Urinalysis with microscopic    Polyp of colon, unspecified part of colon, unspecified type  -     Ambulatory referral to Gastroenterology; Future      Basal cell carcinoma (BCC), unspecified site  -     Ambulatory referral to Dermatology; Future    Subjective:      Patient ID: Melinda Mccoy is a 60 y o  female  HPI    Patient is here for annual physical   She found on lesion on her chest that was diagnosis basal cell carcinoma and will be getting that removed soon  She needs a dermatology consultation which will be addressed today  Blood pressure has been running well with lisinopril 20 mg a day  Denies any cough or any other rash with so  Denies any chest pain shortness of breath lightheadedness palpitation  She will be going for repeat colonoscopy given a colon polyp that was seen few years ago  GI referral will be made  She is also due for lab studies which will be ordered today  I did discuss her high risk of cerebrovascular cardiovascular risk factor given her last LDL  Treatment options were discussed  Patient is due for 2nd pneumonia vaccine in October as well as her flu vaccination  Encouraged her to look into shingles vaccination as well    The following portions of the patient's history were reviewed and updated as appropriate: allergies, current medications, past family history, past medical history, past social history, past surgical history and problem list     Review of Systems   Constitutional: Negative for appetite change, chills, fatigue, fever and unexpected weight change  HENT: Negative for congestion, hearing loss, postnasal drip, trouble swallowing and voice change  Eyes: Negative for pain and visual disturbance  Respiratory: Negative for cough, chest tightness and shortness of breath  Cardiovascular: Negative for chest pain, palpitations and leg swelling  Gastrointestinal: Negative for abdominal pain, blood in stool, constipation, diarrhea, nausea and vomiting  Endocrine: Negative for cold intolerance, heat intolerance, polydipsia and polyphagia  Genitourinary: Negative for difficulty urinating, flank pain, frequency and hematuria  Musculoskeletal: Negative for arthralgias, back pain, gait problem, joint swelling and myalgias  Skin: Negative for rash  Basal cell carcinoma found on her chest that will be getting removed by Dermatology soon   Neurological: Negative for dizziness, weakness, light-headedness, numbness and headaches  Hematological: Negative for adenopathy  Does not bruise/bleed easily  Psychiatric/Behavioral: Negative for confusion, dysphoric mood and sleep disturbance  Objective:      /82 (BP Location: Left arm, Patient Position: Sitting, Cuff Size: Large)   Temp 99 6 °F (37 6 °C) (Tympanic)   Ht 5' 3" (1 6 m)   Wt 91 6 kg (202 lb)   BMI 35 78 kg/m²          Physical Exam  Constitutional:       General: She is not in acute distress  Appearance: She is well-developed  HENT:      Head: Normocephalic  Mouth/Throat:      Pharynx: No oropharyngeal exudate  Eyes:      General: No scleral icterus  Pupils: Pupils are equal, round, and reactive to light  Neck:      Thyroid: No thyromegaly  Vascular: No carotid bruit  Cardiovascular:      Rate and Rhythm: Normal rate and regular rhythm  Heart sounds: Normal heart sounds  No murmur heard  Pulmonary:      Effort: Pulmonary effort is normal  No respiratory distress        Breath sounds: Normal breath sounds  No wheezing or rales  Abdominal:      General: Bowel sounds are normal       Palpations: Abdomen is soft  Tenderness: There is no abdominal tenderness  There is no rebound  Musculoskeletal:      Right lower leg: No edema  Left lower leg: No edema  Comments: Bilateral knee replacement   Lymphadenopathy:      Cervical: No cervical adenopathy  Skin:     General: Skin is warm  Findings: Rash present  Comments: Varicose veins left lower extremity   Neurological:      Mental Status: She is alert and oriented to person, place, and time  Cranial Nerves: No cranial nerve deficit  Deep Tendon Reflexes: Reflexes are normal and symmetric  Psychiatric:         Behavior: Behavior normal          Thought Content: Thought content normal          Judgment: Judgment normal              BMI Counseling: Body mass index is 35 78 kg/m²  The BMI is above normal  Nutrition recommendations include encouraging healthy choices of fruits and vegetables, reducing intake of saturated and trans fat and reducing intake of cholesterol  Exercise recommendations include exercising 3-5 times per week

## 2021-08-12 ENCOUNTER — APPOINTMENT (OUTPATIENT)
Dept: LAB | Facility: MEDICAL CENTER | Age: 66
End: 2021-08-12
Payer: COMMERCIAL

## 2021-08-12 LAB
25(OH)D3 SERPL-MCNC: 38.4 NG/ML (ref 30–100)
ALBUMIN SERPL BCP-MCNC: 3.8 G/DL (ref 3.5–5)
ALP SERPL-CCNC: 112 U/L (ref 46–116)
ALT SERPL W P-5'-P-CCNC: 35 U/L (ref 12–78)
ANION GAP SERPL CALCULATED.3IONS-SCNC: 5 MMOL/L (ref 4–13)
AST SERPL W P-5'-P-CCNC: 21 U/L (ref 5–45)
BACTERIA UR QL AUTO: ABNORMAL /HPF
BASOPHILS # BLD AUTO: 0.09 THOUSANDS/ΜL (ref 0–0.1)
BASOPHILS NFR BLD AUTO: 1 % (ref 0–1)
BILIRUB SERPL-MCNC: 0.43 MG/DL (ref 0.2–1)
BILIRUB UR QL STRIP: NEGATIVE
BUN SERPL-MCNC: 21 MG/DL (ref 5–25)
CALCIUM SERPL-MCNC: 9.3 MG/DL (ref 8.3–10.1)
CHLORIDE SERPL-SCNC: 103 MMOL/L (ref 100–108)
CHOLEST SERPL-MCNC: 215 MG/DL (ref 50–200)
CLARITY UR: CLEAR
CO2 SERPL-SCNC: 29 MMOL/L (ref 21–32)
COLOR UR: YELLOW
CREAT SERPL-MCNC: 0.77 MG/DL (ref 0.6–1.3)
EOSINOPHIL # BLD AUTO: 0.31 THOUSAND/ΜL (ref 0–0.61)
EOSINOPHIL NFR BLD AUTO: 3 % (ref 0–6)
ERYTHROCYTE [DISTWIDTH] IN BLOOD BY AUTOMATED COUNT: 14.5 % (ref 11.6–15.1)
GFR SERPL CREATININE-BSD FRML MDRD: 82 ML/MIN/1.73SQ M
GLUCOSE P FAST SERPL-MCNC: 94 MG/DL (ref 65–99)
GLUCOSE UR STRIP-MCNC: NEGATIVE MG/DL
HCT VFR BLD AUTO: 45.4 % (ref 34.8–46.1)
HDLC SERPL-MCNC: 36 MG/DL
HGB BLD-MCNC: 14.7 G/DL (ref 11.5–15.4)
HGB UR QL STRIP.AUTO: NEGATIVE
HYALINE CASTS #/AREA URNS LPF: ABNORMAL /LPF
IMM GRANULOCYTES # BLD AUTO: 0.03 THOUSAND/UL (ref 0–0.2)
IMM GRANULOCYTES NFR BLD AUTO: 0 % (ref 0–2)
KETONES UR STRIP-MCNC: NEGATIVE MG/DL
LDLC SERPL CALC-MCNC: 133 MG/DL (ref 0–100)
LEUKOCYTE ESTERASE UR QL STRIP: ABNORMAL
LYMPHOCYTES # BLD AUTO: 4.07 THOUSANDS/ΜL (ref 0.6–4.47)
LYMPHOCYTES NFR BLD AUTO: 36 % (ref 14–44)
MCH RBC QN AUTO: 31.7 PG (ref 26.8–34.3)
MCHC RBC AUTO-ENTMCNC: 32.4 G/DL (ref 31.4–37.4)
MCV RBC AUTO: 98 FL (ref 82–98)
MONOCYTES # BLD AUTO: 1.32 THOUSAND/ΜL (ref 0.17–1.22)
MONOCYTES NFR BLD AUTO: 12 % (ref 4–12)
NEUTROPHILS # BLD AUTO: 5.42 THOUSANDS/ΜL (ref 1.85–7.62)
NEUTS SEG NFR BLD AUTO: 48 % (ref 43–75)
NITRITE UR QL STRIP: NEGATIVE
NON-SQ EPI CELLS URNS QL MICRO: ABNORMAL /HPF
NONHDLC SERPL-MCNC: 179 MG/DL
NRBC BLD AUTO-RTO: 0 /100 WBCS
PH UR STRIP.AUTO: 6.5 [PH]
PLATELET # BLD AUTO: 405 THOUSANDS/UL (ref 149–390)
PMV BLD AUTO: 10.6 FL (ref 8.9–12.7)
POTASSIUM SERPL-SCNC: 4.4 MMOL/L (ref 3.5–5.3)
PROT SERPL-MCNC: 7.8 G/DL (ref 6.4–8.2)
PROT UR STRIP-MCNC: NEGATIVE MG/DL
RBC # BLD AUTO: 4.63 MILLION/UL (ref 3.81–5.12)
RBC #/AREA URNS AUTO: ABNORMAL /HPF
SODIUM SERPL-SCNC: 137 MMOL/L (ref 136–145)
SP GR UR STRIP.AUTO: 1.01 (ref 1–1.03)
T4 FREE SERPL-MCNC: 0.9 NG/DL (ref 0.76–1.46)
TRIGL SERPL-MCNC: 230 MG/DL
TSH SERPL DL<=0.05 MIU/L-ACNC: 5.05 UIU/ML (ref 0.36–3.74)
UROBILINOGEN UR QL STRIP.AUTO: 0.2 E.U./DL
WBC # BLD AUTO: 11.24 THOUSAND/UL (ref 4.31–10.16)
WBC #/AREA URNS AUTO: ABNORMAL /HPF

## 2021-08-12 PROCEDURE — 84443 ASSAY THYROID STIM HORMONE: CPT | Performed by: INTERNAL MEDICINE

## 2021-08-12 PROCEDURE — 82306 VITAMIN D 25 HYDROXY: CPT | Performed by: INTERNAL MEDICINE

## 2021-08-12 PROCEDURE — 81001 URINALYSIS AUTO W/SCOPE: CPT | Performed by: INTERNAL MEDICINE

## 2021-08-12 PROCEDURE — 36415 COLL VENOUS BLD VENIPUNCTURE: CPT | Performed by: INTERNAL MEDICINE

## 2021-08-12 PROCEDURE — 85025 COMPLETE CBC W/AUTO DIFF WBC: CPT | Performed by: INTERNAL MEDICINE

## 2021-08-12 PROCEDURE — 84439 ASSAY OF FREE THYROXINE: CPT | Performed by: INTERNAL MEDICINE

## 2021-08-12 PROCEDURE — 80053 COMPREHEN METABOLIC PANEL: CPT | Performed by: INTERNAL MEDICINE

## 2021-08-12 PROCEDURE — 80061 LIPID PANEL: CPT | Performed by: INTERNAL MEDICINE

## 2021-08-16 NOTE — RESULT ENCOUNTER NOTE
Cholesterol is still high  Should start statin if patient agrees  TSH slightly elevated as well  Patient taking the medication regularly  Lastly WBC count slightly elevated  Any infections?

## 2021-08-17 NOTE — RESULT ENCOUNTER NOTE
I am suspecting basal cell surgery to be the explanation mild WBC count    Calling in a prescription statin lab studies in 6 weeks

## 2021-08-25 ENCOUNTER — TELEPHONE (OUTPATIENT)
Dept: FAMILY MEDICINE CLINIC | Facility: CLINIC | Age: 66
End: 2021-08-25

## 2021-09-03 ENCOUNTER — RA CDI HCC (OUTPATIENT)
Dept: OTHER | Facility: HOSPITAL | Age: 66
End: 2021-09-03

## 2021-09-03 NOTE — PROGRESS NOTES
[facility]  coding opportunities       Chart reviewed, no opportunity found: CHART REVIEWED, NO OPPORTUNITY FOUND                        Patients insurance company: [facility] [address]  (Medicare Advantage and Commercial)

## 2021-09-09 ENCOUNTER — OFFICE VISIT (OUTPATIENT)
Dept: FAMILY MEDICINE CLINIC | Facility: CLINIC | Age: 66
End: 2021-09-09
Payer: COMMERCIAL

## 2021-09-09 VITALS
BODY MASS INDEX: 36.14 KG/M2 | DIASTOLIC BLOOD PRESSURE: 72 MMHG | HEART RATE: 66 BPM | TEMPERATURE: 97.2 F | WEIGHT: 204 LBS | RESPIRATION RATE: 18 BRPM | HEIGHT: 63 IN | OXYGEN SATURATION: 98 % | SYSTOLIC BLOOD PRESSURE: 140 MMHG

## 2021-09-09 DIAGNOSIS — E03.8 OTHER SPECIFIED HYPOTHYROIDISM: Primary | ICD-10-CM

## 2021-09-09 DIAGNOSIS — D72.829 LEUKOCYTOSIS, UNSPECIFIED TYPE: ICD-10-CM

## 2021-09-09 DIAGNOSIS — E78.5 HYPERLIPIDEMIA, UNSPECIFIED HYPERLIPIDEMIA TYPE: ICD-10-CM

## 2021-09-09 DIAGNOSIS — I10 HYPERTENSION, UNSPECIFIED TYPE: ICD-10-CM

## 2021-09-09 PROCEDURE — 1036F TOBACCO NON-USER: CPT | Performed by: INTERNAL MEDICINE

## 2021-09-09 PROCEDURE — 99213 OFFICE O/P EST LOW 20 MIN: CPT | Performed by: INTERNAL MEDICINE

## 2021-09-09 RX ORDER — ASPIRIN 81 MG/1
81 TABLET ORAL DAILY
COMMUNITY

## 2021-09-09 RX ORDER — ASCORBIC ACID 500 MG
TABLET ORAL
COMMUNITY

## 2021-09-09 NOTE — PROGRESS NOTES
Assessment/Plan:         Diagnoses and all orders for this visit:      Hypertension, unspecified type   blood pressure good continue current regimen  Hyperlipidemia, unspecified hyperlipidemia type    Tolerating medication continue current regimen lab studies as discussed  Leukocytosis, unspecified type  -     CBC and differential    Other orders  -     ascorbic acid (VITAMIN C) 500 MG tablet; Take by mouth  -     aspirin (ECOTRIN LOW STRENGTH) 81 mg EC tablet; Take 81 mg by mouth daily      Other specified hypothyroidism  -     TSH, 3rd generation with Free T4 reflex    Subjective:      Patient ID: Connie Castro is a 60 y o  female  HPI   patient history of hypertension hyperlipidemia is here to follow-up on chronic medical problems  She is tolerating statins quite well  She will be due for lab studies soon  She wants to hold off till after she has her basal cell surgery that was postponed for this Friday  Patient is doing quite well  She encourage get flu vaccine early October  I recommend getting her 3rd vaccination of COVID before she travels  The following portions of the patient's history were reviewed and updated as appropriate: allergies, current medications, past family history, past medical history, past social history, past surgical history and problem list     Review of Systems   Respiratory: Negative for cough and shortness of breath  Cardiovascular: Negative for chest pain, palpitations and leg swelling  Musculoskeletal: Negative for myalgias  Objective:      /72 (BP Location: Right arm, Patient Position: Sitting, Cuff Size: Large)   Pulse 66   Temp (!) 97 2 °F (36 2 °C) (Tympanic)   Resp 18   Ht 5' 3" (1 6 m)   Wt 92 5 kg (204 lb)   SpO2 98%   BMI 36 14 kg/m²          Physical Exam  Cardiovascular:      Rate and Rhythm: Normal rate and regular rhythm  Heart sounds: Normal heart sounds  No murmur heard       Pulmonary:      Effort: Pulmonary effort is normal  No respiratory distress  Breath sounds: Normal breath sounds  No wheezing or rales  Musculoskeletal:      Right lower leg: No edema  Left lower leg: No edema  Comments: No abnormality of Achilles tendon however recommend stretching exercises to decrease strain on the tenderness   Neurological:      Mental Status: She is alert

## 2021-09-15 ENCOUNTER — TELEPHONE (OUTPATIENT)
Dept: FAMILY MEDICINE CLINIC | Facility: CLINIC | Age: 66
End: 2021-09-15

## 2021-09-15 NOTE — TELEPHONE ENCOUNTER
Sol from [facility] called and stated that this patient is having a surgical procedure done on 9/17/2021  She is having a Basal Cell Carcinoma skin lesion removed  The ICD code is C44 519 and the NPI# is [id number]   The office fax # [phone]

## 2021-09-16 ENCOUNTER — TELEPHONE (OUTPATIENT)
Dept: FAMILY MEDICINE CLINIC | Facility: CLINIC | Age: 66
End: 2021-09-16

## 2021-09-16 NOTE — TELEPHONE ENCOUNTER
Sol from [facility] called and stated that a referral was done for the patient  She said that the referral was done for a procedure that she is having on 9/17/2021 and if she needs a follow up, it is for only one visit  Also our NPI # states that [facility] [address]  Can you please change this referral ?  Thank you!

## 2021-09-24 ENCOUNTER — CONSULT (OUTPATIENT)
Dept: GASTROENTEROLOGY | Facility: CLINIC | Age: 66
End: 2021-09-24
Payer: COMMERCIAL

## 2021-09-24 VITALS
HEIGHT: 63 IN | DIASTOLIC BLOOD PRESSURE: 90 MMHG | SYSTOLIC BLOOD PRESSURE: 130 MMHG | HEART RATE: 71 BPM | BODY MASS INDEX: 36.39 KG/M2 | OXYGEN SATURATION: 97 % | WEIGHT: 205.4 LBS

## 2021-09-24 DIAGNOSIS — K63.5 POLYP OF TRANSVERSE COLON, UNSPECIFIED TYPE: Primary | ICD-10-CM

## 2021-09-24 DIAGNOSIS — R19.5 POSITIVE FIT (FECAL IMMUNOCHEMICAL TEST): ICD-10-CM

## 2021-09-24 PROCEDURE — 99204 OFFICE O/P NEW MOD 45 MIN: CPT | Performed by: INTERNAL MEDICINE

## 2021-09-24 PROCEDURE — 3008F BODY MASS INDEX DOCD: CPT | Performed by: INTERNAL MEDICINE

## 2021-09-24 RX ORDER — ROSUVASTATIN CALCIUM 20 MG/1
TABLET, COATED ORAL
COMMUNITY
Start: 2021-09-01 | End: 2021-09-24

## 2021-09-24 RX ORDER — POLYETHYLENE GLYCOL 3350 17 G/17G
POWDER, FOR SOLUTION ORAL
Qty: 238 G | Refills: 0 | Status: SHIPPED | OUTPATIENT
Start: 2021-09-24 | End: 2021-12-01 | Stop reason: HOSPADM

## 2021-09-24 NOTE — PROGRESS NOTES
[facility] - Outpatient Consultation  Kris Davis 60 y o  female MRN: [id number]  Encounter: [id number]      PCP: Norman Pimentel MD  Referring: MD Jy[address]  [address]  [place],  [facility]      ASSESSMENT AND PLAN:      1  Positive FIT (fecal immunochemical test)  2  Polyp of transverse colon, unspecified type  Last colonoscopy in 2019, recommended for 2 year repeat given previous +FIT  ? Transverse colon abnormality documented, will plan to evaluate this area  S/p CT colonography  - Ambulatory referral to Gastroenterology  - Colonoscopy; Future  - polyethylene glycol (GLYCOLAX) 17 GM/SCOOP powder; At 5pm take 5mgx2 dulcolax  At 6pm mix 238 g miralax in 64oz gatorade  Drink 8oz glass every 5 mins until 32oz finished  Drink remaining as rec  Dispense: 238 g; Refill: 0      ______________________________________________________________________    CC:  Chief Complaint   Patient presents with    Screening Colonoscopy       HPI:      Patient is a 15-year-old female referred for colon polyp, positive fit  She has hypothyroidism, stage IA ER negative left breast cancer s/p mastectomy and adjuvant chemo with no recurrent disease, thrombocytosis, arthritis of the knees status post bilateral knee replacement, HLD  She has initially followed by [facility] following initial positive FIT in 2019  She underwent colonoscopy, CT colonography  She is noted to have polyps, mild diverticular disease, internal hemorrhoids  She was recommended for repeat colonoscopy in 2019  She reports regular bowel movements without rectal bleeding, unintentional weight loss  REVIEW OF SYSTEMS:    CONSTITUTIONAL: Denies any fever, chills, rigors, and weight loss  HEENT: No earache or tinnitus  Denies hearing loss or visual disturbances  CARDIOVASCULAR: No chest pain or palpitations     RESPIRATORY: Denies any cough, hemoptysis, shortness of breath or dyspnea on exertion  GASTROINTESTINAL: As noted in the History of Present Illness  GENITOURINARY: No problems with urination  Denies any hematuria or dysuria  NEUROLOGIC: No dizziness or vertigo, denies headaches  MUSCULOSKELETAL: Denies any muscle or joint pain  SKIN: Denies skin rashes or itching  ENDOCRINE: Denies excessive thirst  Denies intolerance to heat or cold  PSYCHOSOCIAL: Denies depression or anxiety  Denies any recent memory loss  Historical Information   Past Medical History:   Diagnosis Date    Alopecia totalis     Arthritis of knee     bilateral    Breast CA ([facility] )     Left    Cancer ([facility] ) 2021    Basal cell carcinoma on chest    Herpes zoster     Hypothyroidism     Lymphadenopathy, mediastinal     Menopause     Multiple allergies     PHN (postherpetic neuralgia)     Postherpetic neuralgia     Thrombocytopathy (HCC)      Past Surgical History:   Procedure Laterality Date    BREAST RECONSTRUCTION      COLONOSCOPY      also needed CT of colon    COLONOSCOPY      MASTECTOMY, RADICAL Left     OVARIAN CYST REMOVAL  2002    REPLACEMENT TOTAL KNEE Bilateral     R 20, L 20    TUBAL LIGATION Bilateral     UPPER GASTROINTESTINAL ENDOSCOPY      WISDOM TOOTH EXTRACTION       Social History   Social History     Substance and Sexual Activity   Alcohol Use Never     Social History     Substance and Sexual Activity   Drug Use Never     Social History     Tobacco Use   Smoking Status Former Smoker    Packs/day: 6 00    Types: Cigarettes    Quit date: 1980    Years since quittin 7   Smokeless Tobacco Never Used   Tobacco Comment    as per [employer]     Family History   Problem Relation Age of Onset    Diabetes Father     Hypertension Father     Bone cancer Father     Diabetes Sister     Heart attack Mother     Stroke Mother         CVA?        Meds/Allergies       Current Outpatient Medications:     ascorbic acid (VITAMIN C) 500 MG tablet    aspirin (ECOTRIN LOW STRENGTH) 81 mg EC tablet    Calcium Carb-Cholecalciferol (CALCIUM 1000 + D) 1000-800 MG-UNIT TABS    Cetirizine HCl (ZYRTEC ALLERGY) 10 MG CAPS    levothyroxine 125 mcg tablet    lisinopril (ZESTRIL) 20 mg tablet    Multiple Vitamin (MULTI-DAY VITAMINS) TABS    Psyllium (METAMUCIL FIBER PO)    rosuvastatin (CRESTOR) 20 MG tablet    rosuvastatin (CRESTOR) 20 MG tablet    VITAMIN E COMPLEX PO    Zinc Sulfate (ZINC 15 PO)    Omega-3 Fatty Acids ([employer] FISH OIL) 1000 MG CAPS    omeprazole (PriLOSEC) 20 mg delayed release capsule    Allergies   Allergen Reactions    Clarithromycin     Clindamycin Abdominal Pain    Fluticasone Swelling    Other     Sulfanilamide     Adhesive [Medical Tape] Rash    Penicillins Rash    Sulfa Antibiotics Rash           Objective     Blood pressure 130/90, pulse 71, height 5' 3" (1 6 m), weight 93 2 kg (205 lb 6 4 oz), SpO2 97 %, not currently breastfeeding  Body mass index is 36 38 kg/m²  PHYSICAL EXAM:      General Appearance:   Alert, cooperative, no distress   HEENT:   Normocephalic, atraumatic, anicteric  Neck:  Supple, symmetrical, trachea midline   Lungs:   Clear to auscultation bilaterally; no rales, rhonchi or wheezing; respirations unlabored    Heart[de-identified]   Regular rate and rhythm; no murmur, rub, or gallop     Abdomen:   Soft, non-tender, non-distended; normal bowel sounds; no masses, no organomegaly    Genitalia:   Deferred    Rectal:   Deferred    Extremities:  No cyanosis, clubbing or edema    Pulses:  2+ and symmetric    Skin:  No jaundice, rashes, or lesions    Lymph nodes:  No palpable cervical lymphadenopathy        Lab Results:     Lab Results   Component Value Date    WBC 11 24 (H) 08/12/2021    HGB 14 7 08/12/2021    HCT 45 4 08/12/2021    MCV 98 08/12/2021     (H) 08/12/2021       Lab Results   Component Value Date     09/18/2015    K 4 4 08/12/2021     08/12/2021    CO2 29 08/12/2021    ANIONGAP 10 09/18/2015    BUN 21 08/12/2021    CREATININE 0 77 08/12/2021    GLUCOSE 93 09/18/2015    GLUF 94 08/12/2021    CALCIUM 9 3 08/12/2021    AST 21 08/12/2021    ALT 35 08/12/2021    ALKPHOS 112 08/12/2021    PROT 8 1 09/18/2015    BILITOT 0 44 09/18/2015    EGFR 82 08/12/2021       No results found for: INR, PROTIME      Radiology Results:   No results found  Portions of the record may have been created with voice recognition software  Occasional wrong word or "sound a like" substitutions may have occurred due to the inherent limitations of voice recognition software  Read the chart carefully and recognize, using context, where substitutions have occurred

## 2021-09-24 NOTE — PATIENT INSTRUCTIONS
Colon scheduled on 12/1 with Dr Antonia Lowe at [facility] provided verbal instructions/paper work given  Prep-Miralax/Dulcolax

## 2021-09-28 ENCOUNTER — APPOINTMENT (OUTPATIENT)
Dept: LAB | Facility: MEDICAL CENTER | Age: 66
End: 2021-09-28
Payer: COMMERCIAL

## 2021-09-28 LAB
BASOPHILS # BLD AUTO: 0.07 THOUSANDS/ΜL (ref 0–0.1)
BASOPHILS NFR BLD AUTO: 1 % (ref 0–1)
EOSINOPHIL # BLD AUTO: 0.38 THOUSAND/ΜL (ref 0–0.61)
EOSINOPHIL NFR BLD AUTO: 4 % (ref 0–6)
ERYTHROCYTE [DISTWIDTH] IN BLOOD BY AUTOMATED COUNT: 14.2 % (ref 11.6–15.1)
HCT VFR BLD AUTO: 44.5 % (ref 34.8–46.1)
HGB BLD-MCNC: 14.6 G/DL (ref 11.5–15.4)
IMM GRANULOCYTES # BLD AUTO: 0.03 THOUSAND/UL (ref 0–0.2)
IMM GRANULOCYTES NFR BLD AUTO: 0 % (ref 0–2)
LYMPHOCYTES # BLD AUTO: 4.48 THOUSANDS/ΜL (ref 0.6–4.47)
LYMPHOCYTES NFR BLD AUTO: 42 % (ref 14–44)
MCH RBC QN AUTO: 31.9 PG (ref 26.8–34.3)
MCHC RBC AUTO-ENTMCNC: 32.8 G/DL (ref 31.4–37.4)
MCV RBC AUTO: 97 FL (ref 82–98)
MONOCYTES # BLD AUTO: 1.11 THOUSAND/ΜL (ref 0.17–1.22)
MONOCYTES NFR BLD AUTO: 10 % (ref 4–12)
NEUTROPHILS # BLD AUTO: 4.7 THOUSANDS/ΜL (ref 1.85–7.62)
NEUTS SEG NFR BLD AUTO: 43 % (ref 43–75)
NRBC BLD AUTO-RTO: 0 /100 WBCS
PLATELET # BLD AUTO: 384 THOUSANDS/UL (ref 149–390)
PMV BLD AUTO: 10.2 FL (ref 8.9–12.7)
RBC # BLD AUTO: 4.58 MILLION/UL (ref 3.81–5.12)
TSH SERPL DL<=0.05 MIU/L-ACNC: 2.54 UIU/ML (ref 0.36–3.74)
WBC # BLD AUTO: 10.77 THOUSAND/UL (ref 4.31–10.16)

## 2021-09-28 PROCEDURE — 85025 COMPLETE CBC W/AUTO DIFF WBC: CPT | Performed by: INTERNAL MEDICINE

## 2021-09-28 PROCEDURE — 84443 ASSAY THYROID STIM HORMONE: CPT | Performed by: INTERNAL MEDICINE

## 2021-09-29 ENCOUNTER — CLINICAL SUPPORT (OUTPATIENT)
Dept: FAMILY MEDICINE CLINIC | Facility: CLINIC | Age: 66
End: 2021-09-29
Payer: COMMERCIAL

## 2021-09-29 DIAGNOSIS — Z23 IMMUNIZATION DUE: Primary | ICD-10-CM

## 2021-09-29 PROCEDURE — 90471 IMMUNIZATION ADMIN: CPT

## 2021-09-29 PROCEDURE — 90682 RIV4 VACC RECOMBINANT DNA IM: CPT

## 2021-11-08 ENCOUNTER — TELEPHONE (OUTPATIENT)
Dept: SURGICAL ONCOLOGY | Facility: CLINIC | Age: 66
End: 2021-11-08

## 2021-11-12 ENCOUNTER — TELEPHONE (OUTPATIENT)
Dept: FAMILY MEDICINE CLINIC | Facility: CLINIC | Age: 66
End: 2021-11-12

## 2021-11-15 ENCOUNTER — CONSULT (OUTPATIENT)
Dept: SURGICAL ONCOLOGY | Facility: CLINIC | Age: 66
End: 2021-11-15
Payer: MEDICARE

## 2021-11-15 VITALS
HEART RATE: 72 BPM | BODY MASS INDEX: 36.32 KG/M2 | TEMPERATURE: 98.3 F | DIASTOLIC BLOOD PRESSURE: 72 MMHG | OXYGEN SATURATION: 94 % | SYSTOLIC BLOOD PRESSURE: 118 MMHG | RESPIRATION RATE: 18 BRPM | HEIGHT: 63 IN | WEIGHT: 205 LBS

## 2021-11-15 DIAGNOSIS — Z08 ENCOUNTER FOR FOLLOW-UP EXAMINATION AFTER COMPLETED TREATMENT FOR MALIGNANT NEOPLASM: Primary | ICD-10-CM

## 2021-11-15 DIAGNOSIS — Z85.3 HISTORY OF LEFT BREAST CANCER: ICD-10-CM

## 2021-11-15 DIAGNOSIS — Z12.31 VISIT FOR SCREENING MAMMOGRAM: ICD-10-CM

## 2021-11-15 PROCEDURE — 99204 OFFICE O/P NEW MOD 45 MIN: CPT | Performed by: NURSE PRACTITIONER

## 2021-11-16 ENCOUNTER — HOSPITAL ENCOUNTER (OUTPATIENT)
Dept: MAMMOGRAPHY | Facility: MEDICAL CENTER | Age: 66
Discharge: HOME | End: 2021-11-16
Payer: MEDICARE

## 2021-11-16 VITALS — WEIGHT: 205 LBS | HEIGHT: 63 IN | BODY MASS INDEX: 36.32 KG/M2

## 2021-11-16 DIAGNOSIS — Z12.31 VISIT FOR SCREENING MAMMOGRAM: ICD-10-CM

## 2021-11-16 PROCEDURE — 77067 SCR MAMMO BI INCL CAD: CPT

## 2021-11-16 PROCEDURE — 77063 BREAST TOMOSYNTHESIS BI: CPT

## 2021-11-30 ENCOUNTER — TELEPHONE (OUTPATIENT)
Dept: GASTROENTEROLOGY | Facility: MEDICAL CENTER | Age: 66
End: 2021-11-30

## 2021-12-01 ENCOUNTER — HOSPITAL ENCOUNTER (OUTPATIENT)
Dept: GASTROENTEROLOGY | Facility: MEDICAL CENTER | Age: 66
Setting detail: OUTPATIENT SURGERY
Discharge: HOME | End: 2021-12-01
Attending: INTERNAL MEDICINE | Admitting: INTERNAL MEDICINE
Payer: MEDICARE

## 2021-12-01 ENCOUNTER — ANESTHESIA (OUTPATIENT)
Dept: GASTROENTEROLOGY | Facility: MEDICAL CENTER | Age: 66
End: 2021-12-01

## 2021-12-01 ENCOUNTER — ANESTHESIA EVENT (OUTPATIENT)
Dept: GASTROENTEROLOGY | Facility: MEDICAL CENTER | Age: 66
End: 2021-12-01

## 2021-12-01 VITALS
SYSTOLIC BLOOD PRESSURE: 139 MMHG | BODY MASS INDEX: 35.08 KG/M2 | RESPIRATION RATE: 22 BRPM | HEIGHT: 63 IN | DIASTOLIC BLOOD PRESSURE: 78 MMHG | OXYGEN SATURATION: 94 % | HEART RATE: 68 BPM | WEIGHT: 198 LBS | TEMPERATURE: 97.4 F

## 2021-12-01 DIAGNOSIS — R19.5 POSITIVE FIT (FECAL IMMUNOCHEMICAL TEST): ICD-10-CM

## 2021-12-01 PROCEDURE — 88305 TISSUE EXAM BY PATHOLOGIST: CPT | Performed by: PATHOLOGY

## 2021-12-01 PROCEDURE — 45385 COLONOSCOPY W/LESION REMOVAL: CPT | Performed by: INTERNAL MEDICINE

## 2021-12-01 PROCEDURE — 45380 COLONOSCOPY AND BIOPSY: CPT | Performed by: INTERNAL MEDICINE

## 2021-12-01 RX ORDER — PROPOFOL 10 MG/ML
INJECTION, EMULSION INTRAVENOUS AS NEEDED
Status: DISCONTINUED | OUTPATIENT
Start: 2021-12-01 | End: 2021-12-01

## 2021-12-01 RX ORDER — SODIUM CHLORIDE 9 MG/ML
125 INJECTION, SOLUTION INTRAVENOUS CONTINUOUS
Status: DISCONTINUED | OUTPATIENT
Start: 2021-12-01 | End: 2021-12-05 | Stop reason: HOSPADM

## 2021-12-01 RX ADMIN — Medication 40 MG: at 07:39

## 2021-12-01 RX ADMIN — PROPOFOL 100 MG: 10 INJECTION, EMULSION INTRAVENOUS at 07:40

## 2021-12-01 RX ADMIN — SODIUM CHLORIDE 125 ML/HR: 0.9 INJECTION, SOLUTION INTRAVENOUS at 07:17

## 2021-12-01 RX ADMIN — PROPOFOL 100 MG: 10 INJECTION, EMULSION INTRAVENOUS at 07:35

## 2021-12-01 RX ADMIN — PROPOFOL 100 MG: 10 INJECTION, EMULSION INTRAVENOUS at 07:52

## 2021-12-01 RX ADMIN — PROPOFOL 100 MG: 10 INJECTION, EMULSION INTRAVENOUS at 07:47

## 2021-12-03 DIAGNOSIS — E78.5 HYPERLIPIDEMIA, UNSPECIFIED HYPERLIPIDEMIA TYPE: ICD-10-CM

## 2021-12-03 RX ORDER — ROSUVASTATIN CALCIUM 20 MG/1
TABLET, COATED ORAL
Qty: 90 TABLET | Refills: 1 | Status: SHIPPED | OUTPATIENT
Start: 2021-12-03 | End: 2022-08-08

## 2021-12-15 ENCOUNTER — ANNUAL EXAM (OUTPATIENT)
Dept: GYNECOLOGY | Facility: CLINIC | Age: 66
End: 2021-12-15
Payer: MEDICARE

## 2021-12-15 VITALS
DIASTOLIC BLOOD PRESSURE: 76 MMHG | HEART RATE: 67 BPM | WEIGHT: 206 LBS | HEIGHT: 63 IN | BODY MASS INDEX: 36.5 KG/M2 | SYSTOLIC BLOOD PRESSURE: 112 MMHG

## 2021-12-15 DIAGNOSIS — Z01.419 ENCOUNTER FOR GYNECOLOGICAL EXAMINATION WITHOUT ABNORMAL FINDING: Primary | ICD-10-CM

## 2021-12-15 DIAGNOSIS — Z85.3 HISTORY OF LEFT BREAST CANCER: ICD-10-CM

## 2021-12-15 DIAGNOSIS — N95.2 ATROPHIC VAGINITIS: ICD-10-CM

## 2021-12-15 DIAGNOSIS — Z12.4 ENCOUNTER FOR PAPANICOLAOU SMEAR FOR CERVICAL CANCER SCREENING: ICD-10-CM

## 2021-12-15 PROCEDURE — G0145 SCR C/V CYTO,THINLAYER,RESCR: HCPCS | Performed by: OBSTETRICS & GYNECOLOGY

## 2021-12-15 PROCEDURE — G0101 CA SCREEN;PELVIC/BREAST EXAM: HCPCS | Performed by: OBSTETRICS & GYNECOLOGY

## 2021-12-27 LAB
LAB AP GYN PRIMARY INTERPRETATION: NORMAL
Lab: NORMAL

## 2022-01-05 ENCOUNTER — TELEPHONE (OUTPATIENT)
Dept: GASTROENTEROLOGY | Facility: CLINIC | Age: 67
End: 2022-01-05

## 2022-01-05 NOTE — TELEPHONE ENCOUNTER
Patients GI provider:  Dr Destiny Galvez    Number to return call: [phone]    Reason for call: Pt called stating Billing dep needs referral from [address] [place] [address] 9/24/21 in order for the incurrence to cover the visit       Scheduled procedure/appointment date if applicable: N/A

## 2022-01-18 ENCOUNTER — OFFICE VISIT (OUTPATIENT)
Dept: FAMILY MEDICINE CLINIC | Facility: CLINIC | Age: 67
End: 2022-01-18
Payer: MEDICARE

## 2022-01-18 VITALS
HEART RATE: 72 BPM | SYSTOLIC BLOOD PRESSURE: 140 MMHG | DIASTOLIC BLOOD PRESSURE: 86 MMHG | OXYGEN SATURATION: 96 % | WEIGHT: 207.2 LBS | TEMPERATURE: 98.7 F | HEIGHT: 63 IN | BODY MASS INDEX: 36.71 KG/M2

## 2022-01-18 DIAGNOSIS — E03.8 OTHER SPECIFIED HYPOTHYROIDISM: ICD-10-CM

## 2022-01-18 DIAGNOSIS — I10 HYPERTENSION, UNSPECIFIED TYPE: ICD-10-CM

## 2022-01-18 DIAGNOSIS — C50.412 MALIGNANT NEOPLASM OF UPPER-OUTER QUADRANT OF LEFT BREAST IN FEMALE, ESTROGEN RECEPTOR NEGATIVE (HCC): ICD-10-CM

## 2022-01-18 DIAGNOSIS — Z23 ENCOUNTER FOR IMMUNIZATION: ICD-10-CM

## 2022-01-18 DIAGNOSIS — Z23 IMMUNIZATION DUE: Primary | ICD-10-CM

## 2022-01-18 DIAGNOSIS — Z17.1 MALIGNANT NEOPLASM OF UPPER-OUTER QUADRANT OF LEFT BREAST IN FEMALE, ESTROGEN RECEPTOR NEGATIVE (HCC): ICD-10-CM

## 2022-01-18 DIAGNOSIS — Z78.0 POSTMENOPAUSAL: ICD-10-CM

## 2022-01-18 DIAGNOSIS — Z00.00 MEDICARE ANNUAL WELLNESS VISIT, SUBSEQUENT: ICD-10-CM

## 2022-01-18 DIAGNOSIS — D72.829 LEUKOCYTOSIS, UNSPECIFIED TYPE: ICD-10-CM

## 2022-01-18 DIAGNOSIS — D47.3 ESSENTIAL (HEMORRHAGIC) THROMBOCYTHEMIA (HCC): ICD-10-CM

## 2022-01-18 DIAGNOSIS — E66.01 OBESITY, MORBID (HCC): ICD-10-CM

## 2022-01-18 DIAGNOSIS — E78.5 HYPERLIPIDEMIA, UNSPECIFIED HYPERLIPIDEMIA TYPE: ICD-10-CM

## 2022-01-18 DIAGNOSIS — E03.9 ACQUIRED HYPOTHYROIDISM: ICD-10-CM

## 2022-01-18 PROCEDURE — 90732 PPSV23 VACC 2 YRS+ SUBQ/IM: CPT

## 2022-01-18 PROCEDURE — 1123F ACP DISCUSS/DSCN MKR DOCD: CPT | Performed by: INTERNAL MEDICINE

## 2022-01-18 PROCEDURE — G0009 ADMIN PNEUMOCOCCAL VACCINE: HCPCS

## 2022-01-18 PROCEDURE — 99214 OFFICE O/P EST MOD 30 MIN: CPT | Performed by: INTERNAL MEDICINE

## 2022-01-18 PROCEDURE — G0402 INITIAL PREVENTIVE EXAM: HCPCS | Performed by: INTERNAL MEDICINE

## 2022-01-18 NOTE — PROGRESS NOTES
Assessment/Plan:           Problem List Items Addressed This Visit        Endocrine    Hypothyroidism - Primary    Relevant Orders    TSH, 3rd generation       Hematopoietic and Hemostatic    Essential (hemorrhagic) thrombocythemia (HCC)     Platelet counts were normal last lab studies  Will continue to monitor  Other    Leukocytosis    Relevant Orders    CBC and differential    Obesity, morbid (HCC)     Diet modification discussed  Malignant neoplasm of upper-outer quadrant of left breast in female, estrogen receptor negative ([facility] [facility] )     MITZI under care of Dr Nora Morelos           Other Visit Diagnoses     Hypertension, unspecified type        Relevant Orders    Comprehensive metabolic panel    Lipid panel    Postmenopausal        Relevant Orders    DXA bone density spine hip and pelvis    Medicare annual wellness visit, subsequent        Encounter for immunization        Relevant Orders    PNEUMOCOCCAL POLYSACCHARIDE VACCINE 23-VALENT =>1YO SQ IM            Subjective:      Patient ID: Elena Mandujano is a 73 y o  female  HPI  Patient with history of hypertension hyperlipidemia hypothyroidism is here to follow-up on chronic medical problems  Blood pressure is overall stable  Patient has been experiencing nasal congestion and has been taking over-the-counter decongestant with antihistamine  This was discussed  Patient was start using saline rinses and plain antihistamine  The decongestant could also be interfering with sleep as well  Patient would also inquire from  about snoring at night  Otherwise patient denies any chest pain shortness of breath lightheadedness palpitation  She is tolerating Crestor quite well  Due for lab studies  She is on Synthroid 125  Clinically euthyroid  Labs would be ordered        She has history of history of breast cancer 2014 status post lumpectomy with subsequent mastectomy, and adjuvant chemotherapy establish with Dr Nora Morelos,   Recent colonoscopy was reviewed recent colonoscopy with 3 polyps removed with advised to follow-up colonoscopy in 3 years  DEXA scan would be ordered  Patient would need pneumonia 23 today  The following portions of the patient's history were reviewed and updated as appropriate: allergies, current medications, past family history, past medical history, past social history, past surgical history and problem list     Review of Systems      Objective:      /86 (BP Location: Left arm, Patient Position: Sitting, Cuff Size: Standard)   Pulse 72   Temp 98 7 °F (37 1 °C) (Tympanic)   Ht 5' 2 5" (1 588 m)   Wt 94 kg (207 lb 3 2 oz)   SpO2 96%   BMI 37 29 kg/m²          Physical Exam  Cardiovascular:      Rate and Rhythm: Normal rate and regular rhythm  Heart sounds: Normal heart sounds  No murmur heard  Pulmonary:      Effort: Pulmonary effort is normal  No respiratory distress  Breath sounds: Normal breath sounds  No wheezing or rales  Musculoskeletal:      Right lower leg: No edema  Left lower leg: No edema  Neurological:      Mental Status: She is alert     Psychiatric:         Mood and Affect: Mood normal          Behavior: Behavior normal

## 2022-01-18 NOTE — PROGRESS NOTES
Assessment and Plan:     Problem List Items Addressed This Visit        Endocrine    Hypothyroidism - Primary    Relevant Orders    TSH, 3rd generation       Hematopoietic and Hemostatic    Thrombocytosis       Other    Leukocytosis    Relevant Orders    CBC and differential      Other Visit Diagnoses     Hypertension, unspecified type        Relevant Orders    Comprehensive metabolic panel    Lipid panel    Postmenopausal        Relevant Orders    DXA bone density spine hip and pelvis           Preventive health issues were discussed with patient, and age appropriate screening tests were ordered as noted in patient's After Visit Summary  Personalized health advice and appropriate referrals for health education or preventive services given if needed, as noted in patient's After Visit Summary       History of Present Illness:     Patient presents for Medicare Annual Wellness visit    Patient Care Team:  Jovana Razo MD as PCP - General (Internal Medicine)  DO Noé Clemens, DO Eligha Nephew, MD Claressa Seip, MD     Problem List:     Patient Active Problem List   Diagnosis    History of left breast cancer    Leukocytosis    Thrombocytosis    Hypothyroidism    Encounter for follow-up examination after completed treatment for malignant neoplasm      Past Medical and Surgical History:     Past Medical History:   Diagnosis Date    Alopecia totalis     Arthritis of knee     bilateral    Basal cell carcinoma (BCC) of chest     Breast CA ([facility] [address] ) 2014    Left    Cancer ([facility] [address] ) 07/2021    Basal cell carcinoma on chest    Colon polyp     Disease of thyroid gland     Herpes zoster     History of chemotherapy 2014    Hyperlipidemia     Hypertension     Hypothyroidism     Lymphadenopathy, mediastinal     Menopause     Multiple allergies     Murmur, cardiac     PHN (postherpetic neuralgia)     PONV (postoperative nausea and vomiting)     Postherpetic neuralgia     Thrombocytopathy [facility])      Past Surgical History:   Procedure Laterality Date    BASAL CELL CARCINOMA EXCISION  2021    BREAST LUMPECTOMY Left 2014    BREAST RECONSTRUCTION Left     COLONOSCOPY      also needed CT of colon    COLONOSCOPY      MASTECTOMY Left 2014    OVARIAN CYST REMOVAL  2002    REPLACEMENT TOTAL KNEE Bilateral     R 20, L 20    SENTINEL LYMPH NODE BIOPSY Left     TUBAL LIGATION Bilateral     UPPER GASTROINTESTINAL ENDOSCOPY      WISDOM TOOTH EXTRACTION        Family History:     Family History   Problem Relation Age of Onset    Diabetes Father     Hypertension Father     Bone cancer Father 61    Multiple myeloma Father 61    Diabetes Sister     Polycythemia Sister     Heart attack Mother     Stroke Mother         CVA?     Breast cancer Maternal Aunt         age dx unknown    Other Maternal Uncle         Brain tumor    No Known Problems Daughter     No Known Problems Maternal Grandmother     No Known Problems Maternal Grandfather     No Known Problems Paternal Grandmother     No Known Problems Paternal Grandfather     No Known Problems Sister     No Known Problems Daughter     No Known Problems Maternal Aunt     No Known Problems Maternal Aunt     No Known Problems Maternal Aunt     No Known Problems Maternal Aunt     No Known Problems Maternal Aunt     No Known Problems Maternal Aunt       Social History:     Social History     Socioeconomic History    Marital status: /Civil Union     Spouse name: None    Number of children: None    Years of education: None    Highest education level: None   Occupational History     Employer: [employer]   Tobacco Use    Smoking status: Former Smoker     Packs/day: 0 50     Years: 27 00     Pack years: 13 50     Types: Cigarettes     Quit date:      Years since quittin 0    Smokeless tobacco: Never Used   Vaping Use    Vaping Use: Never used   Substance and Sexual Activity    Alcohol use: Yes     Alcohol/week: 10 0 standard drinks     Types: 10 Standard drinks or equivalent per week    Drug use: Never    Sexual activity: Not Currently   Other Topics Concern    None   Social History Narrative    None     Social Determinants of Health     Financial Resource Strain: Not on file   Food Insecurity: Not on file   Transportation Needs: Not on file   Physical Activity: Not on file   Stress: Not on file   Social Connections: Not on file   Intimate Partner Violence: Not on file   Housing Stability: Not on file      Medications and Allergies:     Current Outpatient Medications   Medication Sig Dispense Refill    ascorbic acid (VITAMIN C) 500 MG tablet Take by mouth      aspirin (ECOTRIN LOW STRENGTH) 81 mg EC tablet Take 81 mg by mouth daily      Calcium Carb-Cholecalciferol (CALCIUM 1000 + D) 1000-800 MG-UNIT TABS Take by mouth      Cetirizine HCl (ZYRTEC ALLERGY) 10 MG CAPS Take by mouth      levothyroxine 125 mcg tablet TAKE 1 TABLET BY MOUTH EVERY DAY 90 tablet 3    lisinopril (ZESTRIL) 20 mg tablet TAKE 1 TABLET BY MOUTH EVERY DAY 90 tablet 1    Multiple Vitamin (MULTI-DAY VITAMINS) TABS Take by mouth      Psyllium (METAMUCIL FIBER PO) Take by mouth      rosuvastatin (CRESTOR) 20 MG tablet TAKE 1 TABLET BY MOUTH EVERY DAY 90 tablet 1    VITAMIN E COMPLEX PO Take by mouth      Zinc Sulfate (ZINC 15 PO) Take by mouth       No current facility-administered medications for this visit       Allergies   Allergen Reactions    Clarithromycin     Clindamycin Abdominal Pain    Fluticasone Swelling    Other     Sulfanilamide     Adhesive [Medical Tape] Rash    Penicillins Rash    Sulfa Antibiotics Rash      Immunizations:     Immunization History   Administered Date(s) Administered    COVID-19 MODERNA VACC 0 5 ML IM 03/17/2021, 04/14/2021    INFLUENZA 10/20/2018, 10/22/2019    Influenza, injectable, quadrivalent, preservative free 0 5 mL 10/20/2020    Influenza, recombinant, quadrivalent,injectable, preservative free 09/29/2021    Pneumococcal Conjugate 13-Valent 10/20/2020    Td (adult), adsorbed 04/22/1998    Tdap 05/06/2019      Health Maintenance:         Topic Date Due    HIV Screening  Never done    Breast Cancer Screening: Mammogram  11/16/2023    Colorectal Cancer Screening  11/30/2024    Hepatitis C Screening  Completed         Topic Date Due    COVID-19 Vaccine (3 - Booster for Moderna series) 10/14/2021    Pneumococcal Vaccine: 65+ Years (2 of 2 - PPSV23) 11/26/2021      Medicare Health Risk Assessment:     There were no vitals taken for this visit  Tanja Huff is here for her Subsequent Wellness visit  Health Risk Assessment:   Patient rates overall health as good  Patient feels that their physical health rating is slightly better  Patient is very satisfied with their life  Eyesight was rated as same  Hearing was rated as same  Patient feels that their emotional and mental health rating is same  Patients states they are never, rarely angry  Patient states they are never, rarely unusually tired/fatigued  Pain experienced in the last 7 days has been none  Patient states that she has experienced no weight loss or gain in last 6 months  Fall Risk Screening: In the past year, patient has experienced: no history of falling in past year      Urinary Incontinence Screening:   Patient has not leaked urine accidently in the last six months  Home Safety:  Patient does not have trouble with stairs inside or outside of their home  Patient has working smoke alarms and has working carbon monoxide detector  Home safety hazards include: none  Nutrition:   Current diet is Regular  Medications:   Patient is currently taking over-the-counter supplements  OTC medications include: Multi, C,D, Zinc, Calcium  Patient is able to manage medications       Activities of Daily Living (ADLs)/Instrumental Activities of Daily Living (IADLs):   Walk and transfer into and out of bed and chair?: Yes  Dress and groom yourself?: Yes    Bathe or shower yourself?: Yes    Feed yourself? Yes  Do your laundry/housekeeping?: Yes  Manage your money, pay your bills and track your expenses?: Yes  Make your own meals?: Yes    Do your own shopping?: Yes    Previous Hospitalizations:   Any hospitalizations or ED visits within the last 12 months?: No      Advance Care Planning:   Living will: Yes    Durable POA for healthcare: Yes    Advanced directive: Yes      Comments:  POA    Cognitive Screening:   Provider or family/friend/caregiver concerned regarding cognition?: No    PREVENTIVE SCREENINGS      Cardiovascular Screening:    General: Screening Not Indicated and History Lipid Disorder    Due for: Lipid Panel      Diabetes Screening:     General: Screening Current    Due for: Blood Glucose      Colorectal Cancer Screening:     General: Screening Current      Breast Cancer Screening:     General: History Breast Cancer and Screening Current      Cervical Cancer Screening:    General: Screening Not Indicated      Osteoporosis Screening:      Due for: DXA Axial      Abdominal Aortic Aneurysm (AAA) Screening:        General: Screening Not Indicated      Lung Cancer Screening:     General: Screening Not Indicated      Hepatitis C Screening:    General: Screening Current    Screening, Brief Intervention, and Referral to Treatment (SBIRT)    Screening  Typical number of drinks in a day: 2  Typical number of drinks in a week: 7  Interpretation: Low risk drinking behavior  AUDIT-C Screenin) How often did you have a drink containing alcohol in the past year? 4 or more times a week  2) How many drinks did you have on a typical day when you were drinking in the past year?  1 to 2  3) How often did you have 6 or more drinks on one occasion in the past year? less than monthly    AUDIT-C Score: 5  Interpretation: Score 3-12 (female): POSITIVE screen for alcohol misuse    AUDIT Screenin) How often during the last year have you found that you were not able to stop drinking once you had started? 0 - never  5) How often during the last year have you failed to do what was normally expected from you because of drinking? 0 - never  6) How often during the last year have you needed a first drink in the morning to get yourself going after a heavy drinking session?  0 - never  7) How often during the last year have you had a feeling of guilt or remorse after drinking? 0 - never  8) How often during the last year have you been unable to remember what happened the night before because you had been drinking? 0 - never  9) Have you or someone else been injured as a result of your drinking? 0 - no  10) Has a relative or friend or a doctor or another health worker been concerned about your drinking or suggested you cut down? 0 - no    AUDIT Score: 5  Interpretation: Low risk alcohol consumption    Single Item Drug Screening:  How often have you used an illegal drug (including marijuana) or a prescription medication for non-medical reasons in the past year? never    Single Item Drug Screen Score: 0  Interpretation: Negative screen for possible drug use disorder      Anand Bernstein MD

## 2022-01-18 NOTE — PROGRESS NOTES
Assessment and Plan:     Problem List Items Addressed This Visit        Endocrine    Hypothyroidism - Primary    Relevant Orders    TSH, 3rd generation       Hematopoietic and Hemostatic    Essential (hemorrhagic) thrombocythemia (HCC)     Platelet counts were normal last lab studies  Will continue to monitor  Other    Leukocytosis    Relevant Orders    CBC and differential    Obesity, morbid (HCC)     Diet modification discussed  Malignant neoplasm of upper-outer quadrant of left breast in female, estrogen receptor negative ([facility] [address] )     MITZI under care of Dr Cipriano Mcgrath           Other Visit Diagnoses     Hypertension, unspecified type        Relevant Orders    Comprehensive metabolic panel    Lipid panel    Postmenopausal        Relevant Orders    DXA bone density spine hip and pelvis    Medicare annual wellness visit, subsequent        Encounter for immunization        Relevant Orders    PNEUMOCOCCAL POLYSACCHARIDE VACCINE 23-VALENT =>1YO SQ IM    Hyperlipidemia, unspecified hyperlipidemia type               Preventive health issues were discussed with patient, and age appropriate screening tests were ordered as noted in patient's After Visit Summary  Personalized health advice and appropriate referrals for health education or preventive services given if needed, as noted in patient's After Visit Summary       History of Present Illness:     Patient presents for Medicare Annual Wellness visit    Patient Care Team:  Ileana Melissa MD as PCP - General (Internal Medicine)  Giovany Willoughby, MD Bam Toure MD     Problem List:     Patient Active Problem List   Diagnosis    History of left breast cancer    Leukocytosis    Essential (hemorrhagic) thrombocythemia ([address] )    Hypothyroidism    Encounter for follow-up examination after completed treatment for malignant neoplasm    Obesity, morbid ([address] )    Malignant neoplasm of upper-outer quadrant of left breast in female, estrogen receptor negative ([facility] )      Past Medical and Surgical History:     Past Medical History:   Diagnosis Date    Alopecia totalis     Arthritis of knee     bilateral    Basal cell carcinoma (BCC) of chest     Breast [facility] ) 2014    Left    Cancer ([facility] ) 07/2021    Basal cell carcinoma on chest    Colon polyp     Disease of thyroid gland     Herpes zoster     History of chemotherapy 2014    Hyperlipidemia     Hypertension     Hypothyroidism     Lymphadenopathy, mediastinal     Menopause     Multiple allergies     Murmur, cardiac     PHN (postherpetic neuralgia)     PONV (postoperative nausea and vomiting)     Postherpetic neuralgia     Thrombocytopathy ([facility] )      Past Surgical History:   Procedure Laterality Date    BASAL CELL CARCINOMA EXCISION  08/2021    BREAST LUMPECTOMY Left 2014    BREAST RECONSTRUCTION Left     COLONOSCOPY      also needed CT of colon    COLONOSCOPY      MASTECTOMY Left 2014    OVARIAN CYST REMOVAL  2002    REPLACEMENT TOTAL KNEE Bilateral     R 01/21/20, L 02/25/20    SENTINEL LYMPH NODE BIOPSY Left 2014    TUBAL LIGATION Bilateral     UPPER GASTROINTESTINAL ENDOSCOPY      WISDOM TOOTH EXTRACTION        Family History:     Family History   Problem Relation Age of Onset    Diabetes Father     Hypertension Father     Bone cancer Father 61    Multiple myeloma Father 61    Diabetes Sister     Polycythemia Sister     Heart attack Mother     Stroke Mother         CVA?     Breast cancer Maternal Aunt         age dx unknown    Other Maternal Uncle         Brain tumor    No Known Problems Daughter     No Known Problems Maternal Grandmother     No Known Problems Maternal Grandfather     No Known Problems Paternal Grandmother     No Known Problems Paternal Grandfather     No Known Problems Sister     No Known Problems Daughter     No Known Problems Maternal Aunt     No Known Problems Maternal Aunt     No Known Problems Maternal Aunt     No Known Problems Maternal Aunt     No Known Problems Maternal Aunt     No Known Problems Maternal Aunt       Social History:     Social History     Socioeconomic History    Marital status: /Civil Union     Spouse name: None    Number of children: None    Years of education: None    Highest education level: None   Occupational History     Employer: [employer]   Tobacco Use    Smoking status: Former Smoker     Packs/day: 0 50     Years: 27 00     Pack years: 13 50     Types: Cigarettes     Quit date:      Years since quittin 0    Smokeless tobacco: Never Used   Vaping Use    Vaping Use: Never used   Substance and Sexual Activity    Alcohol use:  Yes     Alcohol/week: 10 0 standard drinks     Types: 10 Standard drinks or equivalent per week    Drug use: Never    Sexual activity: Not Currently   Other Topics Concern    None   Social History Narrative    None     Social Determinants of Health     Financial Resource Strain: Not on file   Food Insecurity: Not on file   Transportation Needs: Not on file   Physical Activity: Not on file   Stress: Not on file   Social Connections: Not on file   Intimate Partner Violence: Not on file   Housing Stability: Not on file      Medications and Allergies:     Current Outpatient Medications   Medication Sig Dispense Refill    ascorbic acid (VITAMIN C) 500 MG tablet Take by mouth      aspirin (ECOTRIN LOW STRENGTH) 81 mg EC tablet Take 81 mg by mouth daily      Calcium Carb-Cholecalciferol (CALCIUM 1000 + D) 1000-800 MG-UNIT TABS Take by mouth      Cetirizine HCl (ZYRTEC ALLERGY) 10 MG CAPS Take by mouth      levothyroxine 125 mcg tablet TAKE 1 TABLET BY MOUTH EVERY DAY 90 tablet 3    lisinopril (ZESTRIL) 20 mg tablet TAKE 1 TABLET BY MOUTH EVERY DAY 90 tablet 1    Multiple Vitamin (MULTI-DAY VITAMINS) TABS Take by mouth      Psyllium (METAMUCIL FIBER PO) Take by mouth      rosuvastatin (CRESTOR) 20 MG tablet TAKE 1 TABLET BY MOUTH EVERY DAY 90 tablet 1    VITAMIN E COMPLEX PO Take by mouth      Zinc Sulfate (ZINC 15 PO) Take by mouth       No current facility-administered medications for this visit  Allergies   Allergen Reactions    Clarithromycin     Clindamycin Abdominal Pain    Fluticasone Swelling    Other     Sulfanilamide     Adhesive [Medical Tape] Rash    Penicillins Rash    Sulfa Antibiotics Rash      Immunizations:     Immunization History   Administered Date(s) Administered    COVID-19 MODERNA VACC 0 5 ML IM 03/17/2021, 04/14/2021    INFLUENZA 10/20/2018, 10/22/2019    Influenza, injectable, quadrivalent, preservative free 0 5 mL 10/20/2020    Influenza, recombinant, quadrivalent,injectable, preservative free 09/29/2021    Pneumococcal Conjugate 13-Valent 10/20/2020    Td (adult), adsorbed 04/22/1998    Tdap 05/06/2019      Health Maintenance:         Topic Date Due    HIV Screening  Never done    Breast Cancer Screening: Mammogram  11/16/2023    Colorectal Cancer Screening  11/30/2024    Hepatitis C Screening  Completed         Topic Date Due    COVID-19 Vaccine (3 - Booster for Moderna series) 10/14/2021    Pneumococcal Vaccine: 65+ Years (2 of 2 - PPSV23) 11/26/2021      Medicare Health Risk Assessment:     /86 (BP Location: Left arm, Patient Position: Sitting, Cuff Size: Standard)   Pulse 72   Temp 98 7 °F (37 1 °C) (Tympanic)   Ht 5' 2 5" (1 588 m)   Wt 94 kg (207 lb 3 2 oz)   SpO2 96%   BMI 37 29 kg/m²      Christie Pantoja is here for her Subsequent Wellness visit  Health Risk Assessment:   Patient rates overall health as good  Patient feels that their physical health rating is slightly better  Patient is very satisfied with their life  Eyesight was rated as same  Hearing was rated as same  Patient feels that their emotional and mental health rating is same  Patients states they are never, rarely angry  Patient states they are never, rarely unusually tired/fatigued   Pain experienced in the last 7 days has been none  Patient states that she has experienced no weight loss or gain in last 6 months  Fall Risk Screening: In the past year, patient has experienced: no history of falling in past year      Urinary Incontinence Screening:   Patient has not leaked urine accidently in the last six months  Home Safety:  Patient does not have trouble with stairs inside or outside of their home  Patient has working smoke alarms and has working carbon monoxide detector  Home safety hazards include: none  Nutrition:   Current diet is Regular  Medications:   Patient is currently taking over-the-counter supplements  OTC medications include: Multi, C,D, Zinc, Calcium  Patient is able to manage medications  Activities of Daily Living (ADLs)/Instrumental Activities of Daily Living (IADLs):   Walk and transfer into and out of bed and chair?: Yes  Dress and groom yourself?: Yes    Bathe or shower yourself?: Yes    Feed yourself? Yes  Do your laundry/housekeeping?: Yes  Manage your money, pay your bills and track your expenses?: Yes  Make your own meals?: Yes    Do your own shopping?: Yes    Previous Hospitalizations:   Any hospitalizations or ED visits within the last 12 months?: No      Advance Care Planning:   Living will: Yes    Durable POA for healthcare:  Yes    Advanced directive: Yes      PREVENTIVE SCREENINGS      Cardiovascular Screening:    General: Screening Not Indicated and History Lipid Disorder      Diabetes Screening:     General: Screening Current      Colorectal Cancer Screening:     General: Screening Current      Breast Cancer Screening:     General: History Breast Cancer      Cervical Cancer Screening:    General: Screening Not Indicated      Lung Cancer Screening:     General: Screening Not Indicated      Hepatitis C Screening:    General: Screening Current    Screening, Brief Intervention, and Referral to Treatment (SBIRT)    Screening  Typical number of drinks in a day: 2  Typical number of drinks in a week: 15  Interpretation: Low risk drinking behavior  AUDIT-C Screenin) How often did you have a drink containing alcohol in the past year? 4 or more times a week  2) How many drinks did you have on a typical day when you were drinking in the past year? 1 to 2  3) How often did you have 6 or more drinks on one occasion in the past year? less than monthly    AUDIT-C Score: 5  Interpretation: Score 3-12 (female): POSITIVE screen for alcohol misuse    AUDIT Screenin) How often during the last year have you found that you were not able to stop drinking once you had started? 0 - never  5) How often during the last year have you failed to do what was normally expected from you because of drinking? 0 - never  6) How often during the last year have you needed a first drink in the morning to get yourself going after a heavy drinking session?  0 - never  7) How often during the last year have you had a feeling of guilt or remorse after drinking? 0 - never  8) How often during the last year have you been unable to remember what happened the night before because you had been drinking? 0 - never  9) Have you or someone else been injured as a result of your drinking? 0 - no  10) Has a relative or friend or a doctor or another health worker been concerned about your drinking or suggested you cut down? 0 - no    AUDIT Score: 5  Interpretation: Low risk alcohol consumption    Single Item Drug Screening:  How often have you used an illegal drug (including marijuana) or a prescription medication for non-medical reasons in the past year? never    Single Item Drug Screen Score: 0  Interpretation: Negative screen for possible drug use disorder      Jovana Razo MD

## 2022-01-20 ENCOUNTER — TELEPHONE (OUTPATIENT)
Dept: FAMILY MEDICINE CLINIC | Facility: CLINIC | Age: 67
End: 2022-01-20

## 2022-01-20 NOTE — TELEPHONE ENCOUNTER
Patient had bill from [facility] due to no referral  Referral faxed to [facility] and bill so it can be resolved on their end

## 2022-01-25 ENCOUNTER — APPOINTMENT (OUTPATIENT)
Dept: LAB | Facility: MEDICAL CENTER | Age: 67
End: 2022-01-25
Payer: MEDICARE

## 2022-01-25 LAB
ALBUMIN SERPL BCP-MCNC: 3.7 G/DL (ref 3.5–5)
ALP SERPL-CCNC: 79 U/L (ref 46–116)
ALT SERPL W P-5'-P-CCNC: 38 U/L (ref 12–78)
ANION GAP SERPL CALCULATED.3IONS-SCNC: 4 MMOL/L (ref 4–13)
AST SERPL W P-5'-P-CCNC: 29 U/L (ref 5–45)
BASOPHILS # BLD AUTO: 0.11 THOUSANDS/ΜL (ref 0–0.1)
BASOPHILS NFR BLD AUTO: 1 % (ref 0–1)
BILIRUB SERPL-MCNC: 0.84 MG/DL (ref 0.2–1)
BUN SERPL-MCNC: 17 MG/DL (ref 5–25)
CALCIUM SERPL-MCNC: 9 MG/DL (ref 8.3–10.1)
CHLORIDE SERPL-SCNC: 105 MMOL/L (ref 100–108)
CHOLEST SERPL-MCNC: 120 MG/DL
CO2 SERPL-SCNC: 29 MMOL/L (ref 21–32)
CREAT SERPL-MCNC: 0.8 MG/DL (ref 0.6–1.3)
EOSINOPHIL # BLD AUTO: 1.36 THOUSAND/ΜL (ref 0–0.61)
EOSINOPHIL NFR BLD AUTO: 11 % (ref 0–6)
ERYTHROCYTE [DISTWIDTH] IN BLOOD BY AUTOMATED COUNT: 14.6 % (ref 11.6–15.1)
GFR SERPL CREATININE-BSD FRML MDRD: 77 ML/MIN/1.73SQ M
GLUCOSE P FAST SERPL-MCNC: 92 MG/DL (ref 65–99)
HCT VFR BLD AUTO: 43.7 % (ref 34.8–46.1)
HDLC SERPL-MCNC: 36 MG/DL
HGB BLD-MCNC: 14.6 G/DL (ref 11.5–15.4)
IMM GRANULOCYTES # BLD AUTO: 0.04 THOUSAND/UL (ref 0–0.2)
IMM GRANULOCYTES NFR BLD AUTO: 0 % (ref 0–2)
LDLC SERPL CALC-MCNC: 47 MG/DL (ref 0–100)
LYMPHOCYTES # BLD AUTO: 4.51 THOUSANDS/ΜL (ref 0.6–4.47)
LYMPHOCYTES NFR BLD AUTO: 35 % (ref 14–44)
MCH RBC QN AUTO: 31.8 PG (ref 26.8–34.3)
MCHC RBC AUTO-ENTMCNC: 33.4 G/DL (ref 31.4–37.4)
MCV RBC AUTO: 95 FL (ref 82–98)
MONOCYTES # BLD AUTO: 1.46 THOUSAND/ΜL (ref 0.17–1.22)
MONOCYTES NFR BLD AUTO: 11 % (ref 4–12)
NEUTROPHILS # BLD AUTO: 5.45 THOUSANDS/ΜL (ref 1.85–7.62)
NEUTS SEG NFR BLD AUTO: 42 % (ref 43–75)
NONHDLC SERPL-MCNC: 84 MG/DL
NRBC BLD AUTO-RTO: 0 /100 WBCS
PLATELET # BLD AUTO: 390 THOUSANDS/UL (ref 149–390)
PMV BLD AUTO: 10.2 FL (ref 8.9–12.7)
POTASSIUM SERPL-SCNC: 4.4 MMOL/L (ref 3.5–5.3)
PROT SERPL-MCNC: 7 G/DL (ref 6.4–8.2)
RBC # BLD AUTO: 4.59 MILLION/UL (ref 3.81–5.12)
SODIUM SERPL-SCNC: 138 MMOL/L (ref 136–145)
TRIGL SERPL-MCNC: 184 MG/DL
TSH SERPL DL<=0.05 MIU/L-ACNC: 4.89 UIU/ML (ref 0.36–3.74)
WBC # BLD AUTO: 12.93 THOUSAND/UL (ref 4.31–10.16)

## 2022-01-25 PROCEDURE — 85025 COMPLETE CBC W/AUTO DIFF WBC: CPT | Performed by: INTERNAL MEDICINE

## 2022-01-25 PROCEDURE — 80061 LIPID PANEL: CPT | Performed by: INTERNAL MEDICINE

## 2022-01-25 PROCEDURE — 80053 COMPREHEN METABOLIC PANEL: CPT | Performed by: INTERNAL MEDICINE

## 2022-01-25 PROCEDURE — 36415 COLL VENOUS BLD VENIPUNCTURE: CPT | Performed by: INTERNAL MEDICINE

## 2022-01-25 PROCEDURE — 84443 ASSAY THYROID STIM HORMONE: CPT | Performed by: INTERNAL MEDICINE

## 2022-01-26 ENCOUNTER — TELEPHONE (OUTPATIENT)
Dept: HEMATOLOGY ONCOLOGY | Facility: CLINIC | Age: 67
End: 2022-01-26

## 2022-01-26 ENCOUNTER — TELEMEDICINE (OUTPATIENT)
Dept: FAMILY MEDICINE CLINIC | Facility: CLINIC | Age: 67
End: 2022-01-26
Payer: MEDICARE

## 2022-01-26 VITALS — WEIGHT: 200 LBS | HEIGHT: 63 IN | BODY MASS INDEX: 35.44 KG/M2

## 2022-01-26 DIAGNOSIS — E03.9 ACQUIRED HYPOTHYROIDISM: ICD-10-CM

## 2022-01-26 DIAGNOSIS — D72.829 LEUKOCYTOSIS, UNSPECIFIED TYPE: Primary | ICD-10-CM

## 2022-01-26 PROCEDURE — 99213 OFFICE O/P EST LOW 20 MIN: CPT | Performed by: INTERNAL MEDICINE

## 2022-01-26 RX ORDER — LEVOTHYROXINE SODIUM 0.12 MG/1
125 TABLET ORAL DAILY
Qty: 90 TABLET | Refills: 3 | Status: SHIPPED | OUTPATIENT
Start: 2022-01-26

## 2022-01-26 NOTE — TELEPHONE ENCOUNTER
New Patient Intake Form   Patient Details:    Pedro Carvajal  1955  [id number]    Appointment Information   Who is calling to schedule? Patient   If not self, what is the caller's name? Please put name of [facility] nurse as well  Referring provider Ana Lilia Garcia MD   What is the diagnosis? Leukocytosis, unspecified type   Is there a confirmed tissue diagnosis? No   Is patient aware of diagnosis? Yes   Have you had any imaging or labs done? If yes, where? (If imaging done outside of [facility], please remind patient to bring a disk ) Yes     01/25   Have you been seen by another Oncologist/Hematologist?  If so, who and where? no   Are the records in [facility] or Care Everywhere? yes   Are records needed from an outside facility? no   If yes, Name of facility, city and state where facility is located  n/a   Preferred [place]   Is the patient willing to be seen by another provider?   (This is for breast patients only) n/a   Miscellaneous Information: Appt made for 03/02

## 2022-01-26 NOTE — PROGRESS NOTES
Virtual Regular Visit    Verification of patient location:    Patient is located in the following state in which I hold an active license [place]      Assessment/Plan:    Problem List Items Addressed This Visit        Endocrine    Hypothyroidism    Relevant Medications    levothyroxine 125 mcg tablet    Other Relevant Orders    TSH, 3rd generation       Other    Leukocytosis - Primary    Relevant Orders    Ambulatory Referral to Hematology / Oncology    CBC and differential      See discussion above I will see patient back in 4-6 months         Reason for visit is   Chief Complaint   Patient presents with    Follow-up     labs    Virtual Regular Visit        Encounter provider Edith Marley MD    Provider located at [place] [address]  Ana Lima , 2000 [address]  [phone]      Recent Visits  Date Type Provider Dept   01/20/22 Telephone [facility] recent visits within past 7 days and meeting all other requirements  Today's Visits  Date Type Provider Dept   01/26/22 Telemedicine Edith Marley MD [facility]   Showing today's visits and meeting all other requirements  Future Appointments  No visits were found meeting these conditions  Showing future appointments within next 150 days and meeting all other requirements       The patient was identified by name and date of birth  Haydee Alvarez was informed that this is a telemedicine visit and that the visit is being conducted through [address] and patient was informed this is a secure, HIPAA-complaint platform  She agrees to proceed     My office door was closed  No one else was in the room  She acknowledged consent and understanding of privacy and security of the video platform  The patient has agreed to participate and understands they can discontinue the visit at any time  Patient is aware this is a billable service  Rosie Peters is a 73 y o  female    Patient is being evaluated tele [facility] to discuss recent lab studies  Her cholesterol is excellent at 45 on current regimen of Crestor 20 mg a day  Denies any muscle aches and pains  TSH is slightly on the higher side however absorption questions are questionable and they were reiterated  I will continue on current regimen and we check her TSH in 3 months  Lastly CBC count is slightly higher than the baseline  Patient had longstanding history of leukocytosis 1st noticed 1998 and had bone marrow biopsy 1999 that showed minimally hypercellular bone marrow with increased megakaryocytes and JAK2 mutation was not detected  It appears patient had up flow cytometry around 2008 which did not show any evidence of leukemia or lymphoma  Her last hematology appointment was reviewed  Patient agrees to follow-up with another hematologist for re-evaluation      HPI     Past Medical History:   Diagnosis Date    Alopecia totalis     Arthritis of knee     bilateral    Basal cell carcinoma (BCC) of chest     Breast CA ([facility] [address] ) 2014    Left    Cancer ([facility] [address] ) 07/2021    Basal cell carcinoma on chest    Colon polyp     Disease of thyroid gland     Herpes zoster     History of chemotherapy 2014    Hyperlipidemia     Hypertension     Hypothyroidism     Lymphadenopathy, mediastinal     Menopause     Multiple allergies     Murmur, cardiac     PHN (postherpetic neuralgia)     PONV (postoperative nausea and vomiting)     Postherpetic neuralgia     Thrombocytopathy ([address] )        Past Surgical History:   Procedure Laterality Date    BASAL CELL CARCINOMA EXCISION  08/2021    BREAST LUMPECTOMY Left 2014    BREAST RECONSTRUCTION Left     COLONOSCOPY      also needed CT of colon    COLONOSCOPY      MASTECTOMY Left 2014    OVARIAN CYST REMOVAL  2002    REPLACEMENT TOTAL KNEE Bilateral     R 01/21/20, L 02/25/20    SENTINEL LYMPH NODE BIOPSY Left 2014    TUBAL LIGATION Bilateral     UPPER GASTROINTESTINAL ENDOSCOPY      WISDOM TOOTH EXTRACTION         Current Outpatient Medications   Medication Sig Dispense Refill    ascorbic acid (VITAMIN C) 500 MG tablet Take by mouth      aspirin (ECOTRIN LOW STRENGTH) 81 mg EC tablet Take 81 mg by mouth daily      Calcium Carb-Cholecalciferol (CALCIUM 1000 + D) 1000-800 MG-UNIT TABS Take by mouth      Cetirizine HCl (ZYRTEC ALLERGY) 10 MG CAPS Take by mouth      levothyroxine 125 mcg tablet Take 1 tablet (125 mcg total) by mouth daily 90 tablet 3    lisinopril (ZESTRIL) 20 mg tablet TAKE 1 TABLET BY MOUTH EVERY DAY 90 tablet 1    Multiple Vitamin (MULTI-DAY VITAMINS) TABS Take by mouth      Psyllium (METAMUCIL FIBER PO) Take by mouth      rosuvastatin (CRESTOR) 20 MG tablet TAKE 1 TABLET BY MOUTH EVERY DAY 90 tablet 1    VITAMIN E COMPLEX PO Take by mouth      Zinc Sulfate (ZINC 15 PO) Take by mouth       No current facility-administered medications for this visit  Allergies   Allergen Reactions    Clarithromycin     Clindamycin Abdominal Pain    Fluticasone Swelling    Other     Sulfanilamide     Adhesive [Medical Tape] Rash    Penicillins Rash    Sulfa Antibiotics Rash       Review of Systems    Video Exam    Vitals:    01/26/22 1012   Weight: 90 7 kg (200 lb)   Height: 5' 2 5" (1 588 m)       Physical Exam     I spent 15 minutes with patient today in which greater than 50% of the time was spent in counseling/coordination of care regarding Plan of care    VIRTUAL VISIT DISCLAIMER      Deatrice Hodgkins verbally agrees to participate in [employer]  Pt is aware that [employer] could be limited without vital signs or the ability to perform a full hands-on physical exam  Nadia Santacruz understands she or the provider may request at any time to terminate the video visit and request the patient to seek care or treatment in person

## 2022-02-11 DIAGNOSIS — I10 HYPERTENSION, UNSPECIFIED TYPE: ICD-10-CM

## 2022-02-11 RX ORDER — LISINOPRIL 20 MG/1
TABLET ORAL
Qty: 90 TABLET | Refills: 1 | Status: SHIPPED | OUTPATIENT
Start: 2022-02-11

## 2022-02-21 ENCOUNTER — HOSPITAL ENCOUNTER (OUTPATIENT)
Dept: BONE DENSITY | Facility: MEDICAL CENTER | Age: 67
Discharge: HOME | End: 2022-02-21
Payer: MEDICARE

## 2022-02-21 DIAGNOSIS — Z78.0 POSTMENOPAUSAL: ICD-10-CM

## 2022-02-21 PROCEDURE — 77080 DXA BONE DENSITY AXIAL: CPT

## 2022-03-02 ENCOUNTER — CONSULT (OUTPATIENT)
Dept: HEMATOLOGY ONCOLOGY | Facility: CLINIC | Age: 67
End: 2022-03-02
Payer: MEDICARE

## 2022-03-02 VITALS
RESPIRATION RATE: 16 BRPM | OXYGEN SATURATION: 97 % | HEIGHT: 63 IN | TEMPERATURE: 97.3 F | SYSTOLIC BLOOD PRESSURE: 124 MMHG | DIASTOLIC BLOOD PRESSURE: 80 MMHG | BODY MASS INDEX: 37.56 KG/M2 | WEIGHT: 212 LBS | HEART RATE: 70 BPM

## 2022-03-02 DIAGNOSIS — D72.829 LEUKOCYTOSIS, UNSPECIFIED TYPE: ICD-10-CM

## 2022-03-02 PROCEDURE — 99214 OFFICE O/P EST MOD 30 MIN: CPT | Performed by: PHYSICIAN ASSISTANT

## 2022-03-02 NOTE — PROGRESS NOTES
Hematology/Oncology Outpatient Follow-up  Cheli Fine 73 y o  female 1955 [id number]    Date:  3/2/2022      Assessment and Plan:    1  Leukocytosis  72-year-old female presents for follow-up regarding history of chronic mild leukocytosis  Patient's white count was slightly elevated in January 2022  WBC 12 9, absolute lymphocytes 4 51  Her previous CBC in September showed an absolute lymphocyte count of 4 48  Her hemoglobin and platelets are normal   She is asymptomatic  Physical exam is unremarkable  Based on current labs I would recommend repeat in 6 months in follow-up at that time  If lymphocytes were to persistently elevated then further evaluation could occur  She is in agreement with the plan  See lab flowsheet below  - Ambulatory Referral to Hematology / Oncology  - CBC and differential; Future    HPI:  72-year-old female presents for follow-up  She was last seen in November 2020  She is being followed due to history of breast cancer  She had history of stage IA left-sided breast cancer, grade 3, triple negative disease, diagnosed in 2014  She underwent mastectomy resulting in an ED followed by adjuvant chemotherapy, AC followed by paclitaxel  She had remained in observation  She was last seen by Dr William Alex edema in November 2020  He recommended follow-up p r n  due to greater than 5 years since diagnosis and to complete imaging with her primary care doctor  She had previously had JAK2 evaluation due to thrombocytosis which was negative  She presents today due to mild leukocytosis  Colonoscopy UTD from Dec 2021  Pap and GYN exam UTD from Dec 2021  Interval history:    ROS: Review of Systems   Constitutional: Negative for activity change, appetite change, chills, fatigue, fever and unexpected weight change  Respiratory: Negative for cough and shortness of breath  Cardiovascular: Negative for chest pain, palpitations and leg swelling     Gastrointestinal: Negative for abdominal pain, blood in stool, constipation, diarrhea, nausea and vomiting  Genitourinary: Negative for difficulty urinating, dysuria and hematuria  Musculoskeletal: Negative for arthralgias  Skin: Negative  Neurological: Negative for dizziness, weakness, light-headedness, numbness and headaches  Hematological: Negative  Psychiatric/Behavioral: Negative          Past Medical History:   Diagnosis Date    Alopecia totalis     Arthritis of knee     bilateral    Basal cell carcinoma (BCC) of chest     [facility]    Left    Cancer ([facility] ) 2021    Basal cell carcinoma on chest    Colon polyp     Disease of thyroid gland     Herpes zoster     History of chemotherapy     Hyperlipidemia     Hypertension     Hypothyroidism     Lymphadenopathy, mediastinal     Menopause     Multiple allergies     Murmur, cardiac     PHN (postherpetic neuralgia)     PONV (postoperative nausea and vomiting)     Postherpetic neuralgia     Thrombocytopathy ([place] [address] )        Past Surgical History:   Procedure Laterality Date    BASAL CELL CARCINOMA EXCISION  2021    BREAST LUMPECTOMY Left     BREAST RECONSTRUCTION Left     COLONOSCOPY      also needed CT of colon    COLONOSCOPY      MASTECTOMY Left     OVARIAN CYST REMOVAL  2002    REPLACEMENT TOTAL KNEE Bilateral     R 20, L 20    SENTINEL LYMPH NODE BIOPSY Left     TUBAL LIGATION Bilateral     UPPER GASTROINTESTINAL ENDOSCOPY      WISDOM TOOTH EXTRACTION         Social History     Socioeconomic History    Marital status: /Civil Union     Spouse name: None    Number of children: None    Years of education: None    Highest education level: None   Occupational History     Employer: [employer]   Tobacco Use    Smoking status: Former Smoker     Packs/day: 0 50     Years: 27 00     Pack years: 13 50     Types: Cigarettes     Quit date:      Years since quittin 1    Smokeless tobacco: Never Used   Vaping Use    Vaping Use: Never used   Substance and Sexual Activity    Alcohol use: Yes     Alcohol/week: 10 0 standard drinks     Types: 10 Standard drinks or equivalent per week    Drug use: Never    Sexual activity: Not Currently   Other Topics Concern    None   Social History Narrative    None     Social Determinants of Health     Financial Resource Strain: Not on file   Food Insecurity: Not on file   Transportation Needs: Not on file   Physical Activity: Not on file   Stress: Not on file   Social Connections: Not on file   Intimate Partner Violence: Not on file   Housing Stability: Not on file       Family History   Problem Relation Age of Onset    Diabetes Father     Hypertension Father     Bone cancer Father 61    Multiple myeloma Father 61    Diabetes Sister     Polycythemia Sister     Heart attack Mother     Stroke Mother         CVA?     Breast cancer Maternal Aunt         age dx unknown    Other Maternal Uncle         Brain tumor    No Known Problems Daughter     No Known Problems Maternal Grandmother     No Known Problems Maternal Grandfather     No Known Problems Paternal Grandmother     No Known Problems Paternal Grandfather     No Known Problems Sister     No Known Problems Daughter     No Known Problems Maternal Aunt     No Known Problems Maternal Aunt     No Known Problems Maternal Aunt     No Known Problems Maternal Aunt     No Known Problems Maternal Aunt     No Known Problems Maternal Aunt        Allergies   Allergen Reactions    Clarithromycin     Clindamycin Abdominal Pain    Fluticasone Swelling    Other     Sulfanilamide     Adhesive [Medical Tape] Rash    Penicillins Rash    Sulfa Antibiotics Rash         Current Outpatient Medications:     ascorbic acid (VITAMIN C) 500 MG tablet, Take by mouth, Disp: , Rfl:     aspirin (ECOTRIN LOW STRENGTH) 81 mg EC tablet, Take 81 mg by mouth daily, Disp: , Rfl:     Calcium Carb-Cholecalciferol (CALCIUM 1000 + D) 1000-800 MG-UNIT TABS, Take by mouth, Disp: , Rfl:     Cetirizine HCl (ZYRTEC ALLERGY) 10 MG CAPS, Take by mouth, Disp: , Rfl:     levothyroxine 125 mcg tablet, Take 1 tablet (125 mcg total) by mouth daily, Disp: 90 tablet, Rfl: 3    lisinopril (ZESTRIL) 20 mg tablet, TAKE 1 TABLET BY MOUTH EVERY DAY, Disp: 90 tablet, Rfl: 1    Multiple Vitamin (MULTI-DAY VITAMINS) TABS, Take by mouth, Disp: , Rfl:     Psyllium (METAMUCIL FIBER PO), Take by mouth, Disp: , Rfl:     rosuvastatin (CRESTOR) 20 MG tablet, TAKE 1 TABLET BY MOUTH EVERY DAY, Disp: 90 tablet, Rfl: 1    VITAMIN E COMPLEX PO, Take by mouth, Disp: , Rfl:     Zinc Sulfate (ZINC 15 PO), Take by mouth, Disp: , Rfl:       Physical Exam:  /80 (BP Location: Right arm, Patient Position: Sitting, Cuff Size: Adult)   Pulse 70   Temp (!) 97 3 °F (36 3 °C) (Temporal)   Resp 16   Ht 5' 2 5" (1 588 m)   Wt 96 2 kg (212 lb)   SpO2 97%   BMI 38 16 kg/m²     Physical Exam  Vitals reviewed  Constitutional:       General: She is not in acute distress  Appearance: She is well-developed  She is not ill-appearing  HENT:      Head: Normocephalic and atraumatic  Eyes:      General: No scleral icterus  Conjunctiva/sclera: Conjunctivae normal    Cardiovascular:      Rate and Rhythm: Normal rate and regular rhythm  Heart sounds: Normal heart sounds  No murmur heard  Pulmonary:      Effort: Pulmonary effort is normal  No respiratory distress  Breath sounds: Normal breath sounds  Abdominal:      Palpations: Abdomen is soft  Tenderness: There is no abdominal tenderness  Musculoskeletal:         General: No tenderness  Normal range of motion  Cervical back: Normal range of motion and neck supple  Right lower leg: No edema  Left lower leg: No edema  Lymphadenopathy:      Cervical: No cervical adenopathy  Skin:     General: Skin is warm and dry     Neurological:      Mental Status: She is alert and oriented to person, place, and time  Cranial Nerves: No cranial nerve deficit  Psychiatric:         Mood and Affect: Mood normal          Behavior: Behavior normal        Labs:        Patient voiced understanding and agreement in the above discussion  Aware to contact our office with questions/symptoms in the interim  This note has been generated by voice recognition software system  Therefore, there may be spelling, grammar, and or syntax errors  Please contact if questions arise

## 2022-04-19 ENCOUNTER — TELEPHONE (OUTPATIENT)
Dept: FAMILY MEDICINE CLINIC | Facility: CLINIC | Age: 67
End: 2022-04-19

## 2022-04-19 DIAGNOSIS — Z12.31 ENCOUNTER FOR SCREENING MAMMOGRAM FOR BREAST CANCER: Primary | ICD-10-CM

## 2022-06-23 ENCOUNTER — APPOINTMENT (OUTPATIENT)
Dept: LAB | Facility: MEDICAL CENTER | Age: 67
End: 2022-06-23
Payer: MEDICARE

## 2022-06-23 DIAGNOSIS — D72.829 LEUKOCYTOSIS, UNSPECIFIED TYPE: ICD-10-CM

## 2022-06-23 LAB
BASOPHILS # BLD AUTO: 0.08 THOUSANDS/ΜL (ref 0–0.1)
BASOPHILS NFR BLD AUTO: 1 % (ref 0–1)
EOSINOPHIL # BLD AUTO: 0.25 THOUSAND/ΜL (ref 0–0.61)
EOSINOPHIL NFR BLD AUTO: 2 % (ref 0–6)
ERYTHROCYTE [DISTWIDTH] IN BLOOD BY AUTOMATED COUNT: 15.3 % (ref 11.6–15.1)
HCT VFR BLD AUTO: 45.3 % (ref 34.8–46.1)
HGB BLD-MCNC: 14.9 G/DL (ref 11.5–15.4)
IMM GRANULOCYTES # BLD AUTO: 0.05 THOUSAND/UL (ref 0–0.2)
IMM GRANULOCYTES NFR BLD AUTO: 0 % (ref 0–2)
LYMPHOCYTES # BLD AUTO: 5.27 THOUSANDS/ΜL (ref 0.6–4.47)
LYMPHOCYTES NFR BLD AUTO: 40 % (ref 14–44)
MCH RBC QN AUTO: 32.6 PG (ref 26.8–34.3)
MCHC RBC AUTO-ENTMCNC: 32.9 G/DL (ref 31.4–37.4)
MCV RBC AUTO: 99 FL (ref 82–98)
MONOCYTES # BLD AUTO: 1.71 THOUSAND/ΜL (ref 0.17–1.22)
MONOCYTES NFR BLD AUTO: 13 % (ref 4–12)
NEUTROPHILS # BLD AUTO: 5.95 THOUSANDS/ΜL (ref 1.85–7.62)
NEUTS SEG NFR BLD AUTO: 44 % (ref 43–75)
NRBC BLD AUTO-RTO: 0 /100 WBCS
PLATELET # BLD AUTO: 441 THOUSANDS/UL (ref 149–390)
PMV BLD AUTO: 10.3 FL (ref 8.9–12.7)
RBC # BLD AUTO: 4.57 MILLION/UL (ref 3.81–5.12)
WBC # BLD AUTO: 13.31 THOUSAND/UL (ref 4.31–10.16)

## 2022-06-23 PROCEDURE — 85025 COMPLETE CBC W/AUTO DIFF WBC: CPT

## 2022-06-23 PROCEDURE — 36415 COLL VENOUS BLD VENIPUNCTURE: CPT

## 2022-07-12 ENCOUNTER — RA CDI HCC (OUTPATIENT)
Dept: OTHER | Facility: HOSPITAL | Age: 67
End: 2022-07-12

## 2022-07-12 NOTE — PROGRESS NOTES
[address]  coding opportunities       Chart reviewed, no opportunity found: CHART REVIEWED, NO OPPORTUNITY FOUND        Patients Insurance     Medicare Insurance: Medicare

## 2022-07-18 ENCOUNTER — OFFICE VISIT (OUTPATIENT)
Dept: FAMILY MEDICINE CLINIC | Facility: CLINIC | Age: 67
End: 2022-07-18
Payer: MEDICARE

## 2022-07-18 VITALS
BODY MASS INDEX: 37.47 KG/M2 | SYSTOLIC BLOOD PRESSURE: 118 MMHG | HEART RATE: 79 BPM | DIASTOLIC BLOOD PRESSURE: 78 MMHG | TEMPERATURE: 97.3 F | WEIGHT: 208.2 LBS | OXYGEN SATURATION: 96 %

## 2022-07-18 DIAGNOSIS — E78.5 HYPERLIPIDEMIA, UNSPECIFIED HYPERLIPIDEMIA TYPE: ICD-10-CM

## 2022-07-18 DIAGNOSIS — Z11.4 SCREENING FOR HIV (HUMAN IMMUNODEFICIENCY VIRUS): Primary | ICD-10-CM

## 2022-07-18 DIAGNOSIS — I10 HYPERTENSION, UNSPECIFIED TYPE: ICD-10-CM

## 2022-07-18 DIAGNOSIS — E03.9 ACQUIRED HYPOTHYROIDISM: ICD-10-CM

## 2022-07-18 PROCEDURE — 99214 OFFICE O/P EST MOD 30 MIN: CPT | Performed by: INTERNAL MEDICINE

## 2022-07-20 NOTE — PROGRESS NOTES
Assessment/Plan:           Problem List Items Addressed This Visit        Endocrine    Hypothyroidism    Relevant Orders    TSH, 3rd generation      Other Visit Diagnoses     Screening for HIV (human immunodeficiency virus)    -  Primary    Relevant Orders    HIV 1/2 Antigen/Antibody (4th Generation) w Reflex [facility]    Hypertension, unspecified type        Relevant Orders    Comprehensive metabolic panel    Hyperlipidemia, unspecified hyperlipidemia type        Relevant Orders    Comprehensive metabolic panel    Lipid panel        patient will qualify for Prevnar 20 next year  Mammogram is scheduled  Chronic medical problem her stable continue current regimen  I will see patient back in 6-9 months    Subjective:      Patient ID: Nicole King is a 73 y o  female  HPI  Patient history of hypertension hyperlipidemia hypothyroidism is here to follow-up on chronic medical problems  Recent CBC showed slight elevation of CBC count  Patient follow-up with her hematology team she has no concerns at this point  Denies any chest pain shortness a breath lightheadedness palpitation  Blood pressure is excellent today  She is tolerating statins well  Will be due for repeat lab studies another 6 months  DEXA scan shows osteopenia she also vitamin-D and exercise recommended  Mammogram is scheduled  The following portions of the patient's history were reviewed and updated as appropriate: allergies, current medications, past family history, past medical history, past social history, past surgical history and problem list       Review of Systems      Objective:      /78 (BP Location: Right arm, Patient Position: Sitting, Cuff Size: Large)   Pulse 79   Temp (!) 97 3 °F (36 3 °C) (Temporal)   Wt 94 4 kg (208 lb 3 2 oz)   SpO2 96%   BMI 37 47 kg/m²          Physical Exam  Neck:      Comments: Negative axillary lymphadenopathy  Cardiovascular:      Rate and Rhythm: Normal rate and regular rhythm     Pulmonary: Effort: Pulmonary effort is normal  No respiratory distress  Breath sounds: Normal breath sounds  No wheezing or rales  Musculoskeletal:      Cervical back: No tenderness  Right lower leg: No edema  Left lower leg: No edema  Lymphadenopathy:      Cervical: No cervical adenopathy  Neurological:      Mental Status: She is alert

## 2022-08-07 DIAGNOSIS — E78.5 HYPERLIPIDEMIA, UNSPECIFIED HYPERLIPIDEMIA TYPE: ICD-10-CM

## 2022-08-08 RX ORDER — ROSUVASTATIN CALCIUM 20 MG/1
TABLET, COATED ORAL
Qty: 90 TABLET | Refills: 1 | Status: SHIPPED | OUTPATIENT
Start: 2022-08-08

## 2022-08-23 ENCOUNTER — TELEPHONE (OUTPATIENT)
Dept: HEMATOLOGY ONCOLOGY | Facility: CLINIC | Age: 67
End: 2022-08-23

## 2022-08-23 NOTE — TELEPHONE ENCOUNTER
Spoke with patient about rescheduled follow up with Macho Rajput  Provided patient with a good date and time  Patient acknowledged new appointment

## 2022-08-26 DIAGNOSIS — I10 HYPERTENSION, UNSPECIFIED TYPE: ICD-10-CM

## 2022-08-26 RX ORDER — LISINOPRIL 20 MG/1
TABLET ORAL
Qty: 90 TABLET | Refills: 1 | Status: SHIPPED | OUTPATIENT
Start: 2022-08-26

## 2022-09-13 ENCOUNTER — APPOINTMENT (OUTPATIENT)
Dept: LAB | Facility: MEDICAL CENTER | Age: 67
End: 2022-09-13
Payer: MEDICARE

## 2022-09-13 DIAGNOSIS — R60.0 FACIAL EDEMA: ICD-10-CM

## 2022-09-13 LAB
CRP SERPL QL: <3 MG/L
ERYTHROCYTE [SEDIMENTATION RATE] IN BLOOD: 2 MM/HOUR (ref 0–29)

## 2022-09-13 PROCEDURE — 36415 COLL VENOUS BLD VENIPUNCTURE: CPT

## 2022-09-13 PROCEDURE — 86038 ANTINUCLEAR ANTIBODIES: CPT

## 2022-09-13 PROCEDURE — 85652 RBC SED RATE AUTOMATED: CPT

## 2022-09-13 PROCEDURE — 86430 RHEUMATOID FACTOR TEST QUAL: CPT

## 2022-09-13 PROCEDURE — 86140 C-REACTIVE PROTEIN: CPT

## 2022-09-14 LAB
RHEUMATOID FACT SER QL LA: NEGATIVE
RYE IGE QN: NEGATIVE

## 2022-09-21 ENCOUNTER — TELEPHONE (OUTPATIENT)
Dept: HEMATOLOGY ONCOLOGY | Facility: CLINIC | Age: 67
End: 2022-09-21

## 2022-09-21 DIAGNOSIS — D72.829 LEUKOCYTOSIS, UNSPECIFIED TYPE: Primary | ICD-10-CM

## 2022-10-26 ENCOUNTER — TELEPHONE (OUTPATIENT)
Dept: SURGICAL ONCOLOGY | Facility: CLINIC | Age: 67
End: 2022-10-26

## 2022-11-15 ENCOUNTER — APPOINTMENT (OUTPATIENT)
Dept: LAB | Facility: MEDICAL CENTER | Age: 67
End: 2022-11-15

## 2022-11-15 DIAGNOSIS — D72.829 LEUKOCYTOSIS, UNSPECIFIED TYPE: ICD-10-CM

## 2022-11-15 LAB
ALBUMIN SERPL BCP-MCNC: 3.9 G/DL (ref 3.5–5)
ALP SERPL-CCNC: 68 U/L (ref 46–116)
ALT SERPL W P-5'-P-CCNC: 27 U/L (ref 12–78)
ANION GAP SERPL CALCULATED.3IONS-SCNC: 2 MMOL/L (ref 4–13)
AST SERPL W P-5'-P-CCNC: 19 U/L (ref 5–45)
BASOPHILS # BLD AUTO: 0.1 THOUSANDS/ÂΜL (ref 0–0.1)
BASOPHILS NFR BLD AUTO: 1 % (ref 0–1)
BILIRUB SERPL-MCNC: 0.38 MG/DL (ref 0.2–1)
BUN SERPL-MCNC: 18 MG/DL (ref 5–25)
CALCIUM SERPL-MCNC: 9.3 MG/DL (ref 8.3–10.1)
CHLORIDE SERPL-SCNC: 106 MMOL/L (ref 96–108)
CHOLEST SERPL-MCNC: 127 MG/DL
CO2 SERPL-SCNC: 29 MMOL/L (ref 21–32)
CREAT SERPL-MCNC: 0.86 MG/DL (ref 0.6–1.3)
EOSINOPHIL # BLD AUTO: 0.21 THOUSAND/ÂΜL (ref 0–0.61)
EOSINOPHIL NFR BLD AUTO: 2 % (ref 0–6)
ERYTHROCYTE [DISTWIDTH] IN BLOOD BY AUTOMATED COUNT: 14.2 % (ref 11.6–15.1)
GFR SERPL CREATININE-BSD FRML MDRD: 71 ML/MIN/1.73SQ M
GLUCOSE P FAST SERPL-MCNC: 111 MG/DL (ref 65–99)
HCT VFR BLD AUTO: 43 % (ref 34.8–46.1)
HDLC SERPL-MCNC: 29 MG/DL
HGB BLD-MCNC: 14.2 G/DL (ref 11.5–15.4)
IMM GRANULOCYTES # BLD AUTO: 0.04 THOUSAND/UL (ref 0–0.2)
IMM GRANULOCYTES NFR BLD AUTO: 0 % (ref 0–2)
LDLC SERPL CALC-MCNC: 46 MG/DL (ref 0–100)
LYMPHOCYTES # BLD AUTO: 5.39 THOUSANDS/ÂΜL (ref 0.6–4.47)
LYMPHOCYTES NFR BLD AUTO: 39 % (ref 14–44)
MCH RBC QN AUTO: 32.6 PG (ref 26.8–34.3)
MCHC RBC AUTO-ENTMCNC: 33 G/DL (ref 31.4–37.4)
MCV RBC AUTO: 99 FL (ref 82–98)
MONOCYTES # BLD AUTO: 1.54 THOUSAND/ÂΜL (ref 0.17–1.22)
MONOCYTES NFR BLD AUTO: 11 % (ref 4–12)
NEUTROPHILS # BLD AUTO: 6.57 THOUSANDS/ÂΜL (ref 1.85–7.62)
NEUTS SEG NFR BLD AUTO: 47 % (ref 43–75)
NONHDLC SERPL-MCNC: 98 MG/DL
NRBC BLD AUTO-RTO: 0 /100 WBCS
PLATELET # BLD AUTO: 460 THOUSANDS/UL (ref 149–390)
PMV BLD AUTO: 10.1 FL (ref 8.9–12.7)
POTASSIUM SERPL-SCNC: 4.4 MMOL/L (ref 3.5–5.3)
PROT SERPL-MCNC: 7.2 G/DL (ref 6.4–8.4)
RBC # BLD AUTO: 4.36 MILLION/UL (ref 3.81–5.12)
SODIUM SERPL-SCNC: 137 MMOL/L (ref 135–147)
TRIGL SERPL-MCNC: 262 MG/DL
TSH SERPL DL<=0.05 MIU/L-ACNC: 1.33 UIU/ML (ref 0.45–4.5)
WBC # BLD AUTO: 13.85 THOUSAND/UL (ref 4.31–10.16)

## 2022-11-17 ENCOUNTER — HOSPITAL ENCOUNTER (OUTPATIENT)
Dept: MAMMOGRAPHY | Facility: MEDICAL CENTER | Age: 67
Discharge: HOME | End: 2022-11-17

## 2022-11-17 VITALS — BODY MASS INDEX: 35.08 KG/M2 | HEIGHT: 63 IN | WEIGHT: 198 LBS

## 2022-11-17 DIAGNOSIS — Z12.31 ENCOUNTER FOR SCREENING MAMMOGRAM FOR BREAST CANCER: ICD-10-CM

## 2022-11-22 ENCOUNTER — ONCOLOGY SURVIVORSHIP (OUTPATIENT)
Dept: SURGICAL ONCOLOGY | Facility: CLINIC | Age: 67
End: 2022-11-22

## 2022-11-22 VITALS
HEIGHT: 65 IN | TEMPERATURE: 97.7 F | WEIGHT: 205 LBS | BODY MASS INDEX: 34.16 KG/M2 | DIASTOLIC BLOOD PRESSURE: 80 MMHG | SYSTOLIC BLOOD PRESSURE: 118 MMHG

## 2022-11-22 DIAGNOSIS — Z12.31 VISIT FOR SCREENING MAMMOGRAM: ICD-10-CM

## 2022-11-22 DIAGNOSIS — Z85.3 HISTORY OF LEFT BREAST CANCER: ICD-10-CM

## 2022-11-22 DIAGNOSIS — Z08 ENCOUNTER FOR FOLLOW-UP EXAMINATION AFTER COMPLETED TREATMENT FOR MALIGNANT NEOPLASM: Primary | ICD-10-CM

## 2022-11-22 RX ORDER — EPINEPHRINE 0.3 MG/.3ML
INJECTION SUBCUTANEOUS
COMMUNITY
Start: 2022-09-08

## 2022-11-22 RX ORDER — LIFITEGRAST 50 MG/ML
SOLUTION/ DROPS OPHTHALMIC
COMMUNITY
Start: 2022-10-07 | End: 2022-11-22

## 2022-11-22 RX ORDER — UREA 10 %
LOTION (ML) TOPICAL
COMMUNITY
End: 2022-11-22

## 2022-11-22 RX ORDER — NEOMYCIN SULFATE, POLYMYXIN B SULFATE, AND DEXAMETHASONE 3.5; 10000; 1 MG/G; [USP'U]/G; MG/G
OINTMENT OPHTHALMIC
COMMUNITY
Start: 2022-10-24 | End: 2022-11-22

## 2022-11-22 RX ORDER — LOTEPREDNOL ETABONATE 5 MG/ML
SUSPENSION/ DROPS OPHTHALMIC
COMMUNITY
Start: 2022-10-27

## 2022-11-22 RX ORDER — CYCLOSPORINE 0.5 MG/ML
EMULSION OPHTHALMIC
COMMUNITY
Start: 2022-11-21

## 2022-11-22 NOTE — PROGRESS NOTES
Assessment/Plan:    Diagnoses and all orders for this visit:    Encounter for follow-up examination after completed treatment for malignant neoplasm    History of left breast cancer    Patient is a 55-year-old female that was diagnosed with a left breast cancer in 2013  Her pathology revealed invasive ductal carcinoma, triple negative  She underwent a lumpectomy and sentinel node biopsy which revealed close margins  She underwent a completion mastectomy and had multiple reconstructive surgeries  She completed adjuvant chemotherapy and is currently on observation  She had undergone genetic testing, most recently in 2015 with the [facility] panel which was negative  She had a right 3D screening mammogram last week which was benign  She offers no new complaints today and there are no concerns on today's exam   Breast cancer survivorship visit performed today and treatment summary and care plan were reviewed with patient  Referral placed to the strength [facility] program   She is up-to-date on osteoporosis screening as well as colorectal and cervical cancer screenings  Script given for next year's mammogram and patient will return to survivorship clinic in 1 year or sooner should the need arise  -     Ambulatory referral to oncology social worker; Future  -     Ambulatory referral to Physical Therapy; Future    Visit for screening mammogram  -     Mammo screening right w 3d & cad; Future        REASON FOR VISIT:   Survivorship      Previous therapy:  Oncology History   History of left breast cancer   11/13/2013 Biopsy    Left breast stereotactic biopsy    Ductal carcinoma in situ  ER 10%  DC negative     12/2013 Biopsy    MRI guided biopsy       Left breast invasive ductal carcinoma  ER negative  DC negative  HER-2 negative     12/2013 Genetic Testing    Patient has genetic testing done for BRCA1, BRCA2  Results revealed patient has the following mutation(s):  negative     1/16/2014 Surgery    Left lumpectomy, sentinel node biopsy (Dr Eleanor Church)    pT1c pN0  Close margins     1/24/2014 Surgery    Left mastectomy     No residual malignancy     3/2014 - 6/2014 Chemotherapy    Dose dense Adriamycin and Cytoxan for 4 courses and then dose dense Taxol four courses      11/2015 Genetic Testing    Patient has genetic testing done for [employer]  APC, BELEM,  BARD1, BMPR1A, BRCA1, BRCA2, BRIP1, CDH1, CDK4, CDKN2A, CHEK2, EPCAM (large rearrangment only),  MLH1, MSH2,  MSH6, MUTYH, NBN, , PALB2, PMS2, PTEN, RAD51C, RAD51D,  SMAD4, STK11, TP53    Results revealed patient has the following mutation(s):  Negative           Patient ID: Sheron Hernandez is a 73 y o  female  Presenting today for a breast cancer survivorship visit  She notices no changes on her self-breast exam   She had a right-sided mammogram performed on November 17, 2022 which was BI-RADS 1 category 3 density  She denies persistent headaches, back pain or bone pain, cough or shortness of breath, abdominal pain  She had a DEXA scan in February of 2022 which revealed osteopenia  Had a colonoscopy in December of 2021, repeat in 3 years  Last Pap smear was December of 2021  Review of Systems   Constitutional: Negative for activity change, appetite change, chills, fatigue, fever and unexpected weight change  Eyes: Positive for discharge (Allergic reaction causing excessive eye watering)  Respiratory: Negative for cough and shortness of breath  Cardiovascular: Negative for chest pain  Gastrointestinal: Negative for abdominal pain, constipation, diarrhea, nausea and vomiting  Musculoskeletal: Negative for arthralgias, back pain, gait problem and myalgias  Skin: Negative for color change and rash  Neurological: Negative for dizziness and headaches  Hematological: Negative for adenopathy  Psychiatric/Behavioral: Negative for agitation and confusion  All other systems reviewed and are negative        Objective:    Blood pressure 118/80, temperature 97 7 °F (36 5 °C), temperature source Tympanic, height 5' 5" (1 651 m), weight 93 kg (205 lb), not currently breastfeeding  Body mass index is 34 11 kg/m²  Physical Exam  Vitals reviewed  Constitutional:       General: She is not in acute distress  Appearance: Normal appearance  She is well-developed  She is not diaphoretic  HENT:      Head: Normocephalic and atraumatic  Cardiovascular:      Rate and Rhythm: Normal rate and regular rhythm  Heart sounds: Normal heart sounds  Pulmonary:      Effort: Pulmonary effort is normal       Breath sounds: Normal breath sounds  Chest:   Breasts:     Right: Skin change (reduction/lift scars present) present  No swelling, bleeding, inverted nipple, mass, nipple discharge or tenderness  Comments: Left reconstructed breast ( Latissimus dorsi flap)  Without masses, skin changes or nodules  Abdominal:      Palpations: Abdomen is soft  There is no mass  Tenderness: There is no abdominal tenderness  Musculoskeletal:         General: Normal range of motion  Cervical back: Normal range of motion  Lymphadenopathy:      Upper Body:      Right upper body: No supraclavicular or axillary adenopathy  Left upper body: No supraclavicular or axillary adenopathy  Skin:     General: Skin is warm and dry  Findings: No rash  Neurological:      Mental Status: She is alert and oriented to person, place, and time     Psychiatric:         Speech: Speech normal          Discussed symptoms related to disease recurrence, Yes    Evaluated for late effects related to cancer treatment, Yes, describe: Discussed effects of surgery, chemotherapy    Screening current for cervical cancer, Yes, describe: Pap smear December 2021    Screening current for colon cancer, Yes, describe: Colonoscopy December 2021 and a repeat in 3 years    Cancer rehabilitation services addressed, Yes, describe: Referral placed to [facility]    Screening current for osteoporosis, Yes, describe: DEXA scan February 2022    Oncology Treatment Summary reviewed with patient and copy provided, Yes    Referral placed for psychosocial evaluation/screening to oncology social work  Yes    I have spent 45 minutes with Patient  today in which greater than 50% of this time was spent in counseling/coordination of care regarding breast cancer survivorship

## 2022-11-23 ENCOUNTER — TELEPHONE (OUTPATIENT)
Dept: SURGICAL ONCOLOGY | Facility: CLINIC | Age: 67
End: 2022-11-23

## 2022-11-23 DIAGNOSIS — D47.1 MYELOPROLIFERATIVE DISORDER (HCC): ICD-10-CM

## 2022-11-23 DIAGNOSIS — D72.820 LYMPHOCYTOSIS: ICD-10-CM

## 2022-11-23 DIAGNOSIS — D47.9 LYMPHOPROLIFERATIVE DISORDER (HCC): ICD-10-CM

## 2022-11-23 DIAGNOSIS — D75.839 THROMBOCYTOSIS: Primary | ICD-10-CM

## 2022-11-23 NOTE — TELEPHONE ENCOUNTER
I have placed 2 additional blood tests for patient to complete prior to visit in Dec based on labs she had in Nov  Please advise to complete at least 1 week before appt

## 2022-11-23 NOTE — TELEPHONE ENCOUNTER
JAK2 and Leukemia/Lymphoma Cytometry lab orders added by Jerome Lujan PA-C today  CBC re-ordered by me  JULIANNE advising patient to complete labs 1 week prior to visit at a [facility] lab  Specialty labs may take up 14 days to result, they may be completed 2 weeks prior  [employer] number provided for possible call back

## 2022-11-28 ENCOUNTER — APPOINTMENT (OUTPATIENT)
Dept: LAB | Facility: MEDICAL CENTER | Age: 67
End: 2022-11-28

## 2022-11-28 ENCOUNTER — PATIENT OUTREACH (OUTPATIENT)
Dept: CASE MANAGEMENT | Facility: OTHER | Age: 67
End: 2022-11-28

## 2022-11-28 DIAGNOSIS — D72.820 LYMPHOCYTOSIS: ICD-10-CM

## 2022-11-28 DIAGNOSIS — D75.839 THROMBOCYTOSIS: ICD-10-CM

## 2022-11-28 DIAGNOSIS — D47.9 LYMPHOPROLIFERATIVE DISORDER (HCC): ICD-10-CM

## 2022-11-28 DIAGNOSIS — Z11.4 SCREENING FOR HIV (HUMAN IMMUNODEFICIENCY VIRUS): ICD-10-CM

## 2022-11-28 DIAGNOSIS — D47.1 MYELOPROLIFERATIVE DISORDER (HCC): ICD-10-CM

## 2022-11-28 LAB
BASOPHILS # BLD AUTO: 0.08 THOUSANDS/ÂΜL (ref 0–0.1)
BASOPHILS NFR BLD AUTO: 1 % (ref 0–1)
EOSINOPHIL # BLD AUTO: 0.29 THOUSAND/ÂΜL (ref 0–0.61)
EOSINOPHIL NFR BLD AUTO: 2 % (ref 0–6)
ERYTHROCYTE [DISTWIDTH] IN BLOOD BY AUTOMATED COUNT: 14.5 % (ref 11.6–15.1)
HCT VFR BLD AUTO: 43.4 % (ref 34.8–46.1)
HGB BLD-MCNC: 14.2 G/DL (ref 11.5–15.4)
IMM GRANULOCYTES # BLD AUTO: 0.05 THOUSAND/UL (ref 0–0.2)
IMM GRANULOCYTES NFR BLD AUTO: 0 % (ref 0–2)
LYMPHOCYTES # BLD AUTO: 4.98 THOUSANDS/ÂΜL (ref 0.6–4.47)
LYMPHOCYTES NFR BLD AUTO: 37 % (ref 14–44)
MCH RBC QN AUTO: 32.2 PG (ref 26.8–34.3)
MCHC RBC AUTO-ENTMCNC: 32.7 G/DL (ref 31.4–37.4)
MCV RBC AUTO: 98 FL (ref 82–98)
MONOCYTES # BLD AUTO: 1.33 THOUSAND/ÂΜL (ref 0.17–1.22)
MONOCYTES NFR BLD AUTO: 10 % (ref 4–12)
NEUTROPHILS # BLD AUTO: 6.65 THOUSANDS/ÂΜL (ref 1.85–7.62)
NEUTS SEG NFR BLD AUTO: 50 % (ref 43–75)
NRBC BLD AUTO-RTO: 0 /100 WBCS
PLATELET # BLD AUTO: 452 THOUSANDS/UL (ref 149–390)
PMV BLD AUTO: 10.3 FL (ref 8.9–12.7)
RBC # BLD AUTO: 4.41 MILLION/UL (ref 3.81–5.12)
WBC # BLD AUTO: 13.38 THOUSAND/UL (ref 4.31–10.16)

## 2022-11-28 NOTE — PROGRESS NOTES
OSW received SW referral  OSW will place outreach TC to complete the distress thermometer and psychosocial assessment

## 2022-11-29 LAB — HIV 1+2 AB+HIV1 P24 AG SERPL QL IA: NORMAL

## 2022-12-02 ENCOUNTER — PATIENT OUTREACH (OUTPATIENT)
Dept: CASE MANAGEMENT | Facility: OTHER | Age: 67
End: 2022-12-02

## 2022-12-02 LAB — SCAN RESULT: NORMAL

## 2022-12-08 ENCOUNTER — PATIENT OUTREACH (OUTPATIENT)
Dept: CASE MANAGEMENT | Facility: OTHER | Age: 67
End: 2022-12-08

## 2022-12-08 NOTE — PROGRESS NOTES
Biopsychosocial and Barriers Assessment Survivorship     Home/Cell Phone: [phone]  Emergency Contact: [phone]  Marital Status:   Interpretation concerns, speaks another language, preferred language: English  Cultural concerns: none  Ability to read or write: yes    Housing: Split level  Home Setup: 2 steps to enter  Lives With: spouse  Daily Living Activities: independent  Durable Medical Equipment: none  Ambulation: independent    Preferred Pharmacy: [facility]  Medication coverage: yes    Employment: Retired- mortgages  [place] Status/Location: n/a  Ability to pay bills: yes  POA/LW/AD: not addressed    Additional Comments:    OSW placed outreach TC to pt this afternoon  OSW introduced self and role  Pt is a pleasant woman who is in survivorship  Pt completed a Dt, where she scored a 0/10  She does not report any concerns at this time  She is enjoying prison and states that everything is good  Pt does not have any concerns at this time, therefore the referral will be closed

## 2022-12-09 ENCOUNTER — TELEPHONE (OUTPATIENT)
Dept: HEMATOLOGY ONCOLOGY | Facility: HOSPITAL | Age: 67
End: 2022-12-09

## 2022-12-09 LAB — MISCELLANEOUS LAB TEST RESULT: NORMAL

## 2022-12-14 ENCOUNTER — OFFICE VISIT (OUTPATIENT)
Dept: HEMATOLOGY ONCOLOGY | Facility: CLINIC | Age: 67
End: 2022-12-14

## 2022-12-14 VITALS
WEIGHT: 198 LBS | HEIGHT: 63 IN | RESPIRATION RATE: 16 BRPM | HEART RATE: 81 BPM | BODY MASS INDEX: 35.08 KG/M2 | SYSTOLIC BLOOD PRESSURE: 124 MMHG | OXYGEN SATURATION: 97 % | DIASTOLIC BLOOD PRESSURE: 78 MMHG | TEMPERATURE: 98 F

## 2022-12-14 DIAGNOSIS — D72.829 LEUKOCYTOSIS, UNSPECIFIED TYPE: Primary | ICD-10-CM

## 2022-12-14 DIAGNOSIS — D75.839 THROMBOCYTOSIS: ICD-10-CM

## 2022-12-14 RX ORDER — TACROLIMUS 1 MG/G
OINTMENT TOPICAL
COMMUNITY
Start: 2022-11-22

## 2022-12-14 NOTE — PROGRESS NOTES
Hematology/Oncology Outpatient Follow-up  Smitha Keane 78 y o  female [id number]    Date:  12/14/2022      Assessment and Plan:  1  Leukocytosis 2  Thrombocytosis  81-year-old female presents for follow-up regarding history of leukocytosis and thrombocytosis  11/28/22 labs  JASON-2 negative, Flow cytometry negative   WBC has been around 13,000 for the last 3 readings  Platelets most recently were 452, absent lymphocytes 4 98, absolute monocytes 1 33  The monocyte level actually is improved compared to the last 2 readings  We reviewed the last up for evaluation would be a bone marrow biopsy  She states she had this many years ago with Dr Shiela Leach and it was fine  She would prefer to wait at this time  She is asymptomatic  We will continue to monitor every 6 months  Follow-up at that time  - CBC and differential; Future      HPI:  81-year-old female presents for follow-up  She was last seen in November 2020  She is being followed due to history of breast cancer  She had history of stage IA left-sided breast cancer, grade 3, triple negative disease, diagnosed in 2014  She underwent mastectomy resulting in an ED followed by adjuvant chemotherapy, AC followed by paclitaxel  She had remained in observation  She was last seen by Dr Costa Khoury edema in November 2020  He recommended follow-up p r n  due to greater than 5 years since diagnosis and to complete imaging with her primary care doctor        She had previously had JAK2 evaluation due to thrombocytosis which was negative      She presents today due to mild leukocytosis      Colonoscopy UTD from Dec 2021       Pap and GYN exam UTD from Dec 2021  Interval history: she states that she has had bone marrow biopsy with Dr Shiela Leach many years ago as well     ROS: Review of Systems   Constitutional: Negative for activity change, appetite change, chills, fatigue, fever and unexpected weight change  HENT: Negative for mouth sores and nosebleeds      Respiratory: Negative for cough and shortness of breath  Cardiovascular: Negative for chest pain, palpitations and leg swelling  Gastrointestinal: Negative for abdominal pain, constipation, diarrhea, nausea and vomiting  Genitourinary: Negative for difficulty urinating, dysuria and hematuria  Musculoskeletal: Negative for arthralgias  Skin: Negative  Neurological: Negative for dizziness, weakness, light-headedness, numbness and headaches  Hematological: Negative  Psychiatric/Behavioral: Negative          Past Medical History:   Diagnosis Date   • Alopecia totalis    • Arthritis of knee     bilateral   • Basal cell carcinoma (BCC) of chest    • Breast CA ([facility] ) 2014    Left   • Cancer ([facility] ) 07/2021    Basal cell carcinoma on chest   • Colon polyp    • Disease of thyroid gland    • Herpes zoster    • History of chemotherapy 2014   • Hyperlipidemia    • Hypertension    • Hypothyroidism    • Lymphadenopathy, mediastinal    • Menopause    • Multiple allergies    • Murmur, cardiac    • PHN (postherpetic neuralgia)    • PONV (postoperative nausea and vomiting)    • Postherpetic neuralgia    • Thrombocytopathy [facility])        Past Surgical History:   Procedure Laterality Date   • BASAL CELL CARCINOMA EXCISION  08/2021   • BREAST LUMPECTOMY Left 2014   • BREAST RECONSTRUCTION Left    • COLONOSCOPY      also needed CT of colon   • COLONOSCOPY     • MASTECTOMY Left 2014   • OVARIAN CYST REMOVAL  2002   • REPLACEMENT TOTAL KNEE Bilateral     R 01/21/20, L 02/25/20   • SENTINEL LYMPH NODE BIOPSY Left 2014   • TUBAL LIGATION Bilateral    • UPPER GASTROINTESTINAL ENDOSCOPY     • WISDOM TOOTH EXTRACTION         Social History     Socioeconomic History   • Marital status: /Civil Union     Spouse name: Not on file   • Number of children: Not on file   • Years of education: Not on file   • Highest education level: Not on file   Occupational History     Employer: [employer]   Tobacco Use   • Smoking status: Former Packs/day: 0 50     Years: 27 00     Pack years: 13 50     Types: Cigarettes     Quit date:      Years since quittin 9   • Smokeless tobacco: Never   Vaping Use   • Vaping Use: Never used   Substance and Sexual Activity   • Alcohol use: Yes     Alcohol/week: 10 0 standard drinks     Types: 10 Standard drinks or equivalent per week   • Drug use: Never   • Sexual activity: Not Currently   Other Topics Concern   • Not on file   Social History Narrative   • Not on file     Social Determinants of Health     Financial Resource Strain: Not on file   Food Insecurity: Not on file   Transportation Needs: Not on file   Physical Activity: Not on file   Stress: Not on file   Social Connections: Not on file   Intimate Partner Violence: Not on file   Housing Stability: Not on file       Family History   Problem Relation Age of Onset   • Diabetes Father    • Hypertension Father    • Bone cancer Father 61   • Multiple myeloma Father 61   • Diabetes Sister    • Polycythemia Sister    • Heart attack Mother    • Stroke Mother         CVA?    • Breast cancer Maternal Aunt         age dx unknown   • Other Maternal Uncle         Brain tumor   • No Known Problems Daughter    • No Known Problems Maternal Grandmother    • No Known Problems Maternal Grandfather    • No Known Problems Paternal Grandmother    • No Known Problems Paternal Grandfather    • No Known Problems Sister    • No Known Problems Daughter    • No Known Problems Maternal Aunt    • No Known Problems Maternal Aunt    • No Known Problems Maternal Aunt    • No Known Problems Maternal Aunt    • No Known Problems Maternal Aunt    • No Known Problems Maternal Aunt        Allergies   Allergen Reactions   • Clarithromycin    • Clindamycin Abdominal Pain   • Fluticasone Swelling   • Other    • Sulfanilamide    • Adhesive [Medical Tape] Rash   • Penicillins Rash   • Sulfa Antibiotics Rash         Current Outpatient Medications:   •  ascorbic acid (VITAMIN C) 500 MG tablet, Take by mouth, Disp: , Rfl:   •  aspirin (ECOTRIN LOW STRENGTH) 81 mg EC tablet, Take 81 mg by mouth daily, Disp: , Rfl:   •  Calcium Carb-Cholecalciferol 1000-800 MG-UNIT TABS, Take by mouth, Disp: , Rfl:   •  Cetirizine HCl 10 MG CAPS, Take by mouth, Disp: , Rfl:   •  EPINEPHrine (EPIPEN) 0 3 mg/0 3 mL SOAJ, INJECT 0 3 ML (0 3 MG TOTAL) INJECT INTO THE MUSCLE ONE TIME FOR 1 DOSE , Disp: , Rfl:   •  levothyroxine 125 mcg tablet, Take 1 tablet (125 mcg total) by mouth daily, Disp: 90 tablet, Rfl: 3  •  lisinopril (ZESTRIL) 20 mg tablet, TAKE 1 TABLET BY MOUTH EVERY DAY, Disp: 90 tablet, Rfl: 1  •  loteprednol etabonate (LOTEMAX) 0 5 % ophthalmic suspension, INSTILL AROUND BOTH EYES THREE TIMES DAILY AS DIRECTED , Disp: , Rfl:   •  Multiple Vitamin (MULTI-DAY VITAMINS) TABS, Take by mouth, Disp: , Rfl:   •  Psyllium (METAMUCIL FIBER PO), Take by mouth, Disp: , Rfl:   •  Restasis 0 05 % ophthalmic emulsion, , Disp: , Rfl:   •  rosuvastatin (CRESTOR) 20 MG tablet, TAKE 1 TABLET BY MOUTH EVERY DAY, Disp: 90 tablet, Rfl: 1  •  VITAMIN E COMPLEX PO, Take by mouth, Disp: , Rfl:   •  Zinc Sulfate (ZINC 15 PO), Take by mouth, Disp: , Rfl:       Physical Exam:  There were no vitals taken for this visit  Physical Exam  Vitals reviewed  Constitutional:       General: She is not in acute distress  Appearance: She is well-developed  She is not ill-appearing  HENT:      Head: Normocephalic and atraumatic  Eyes:      General: No scleral icterus  Conjunctiva/sclera: Conjunctivae normal    Cardiovascular:      Rate and Rhythm: Normal rate and regular rhythm  Heart sounds: Normal heart sounds  No murmur heard  Pulmonary:      Effort: Pulmonary effort is normal  No respiratory distress  Breath sounds: Normal breath sounds  Abdominal:      Palpations: Abdomen is soft  Tenderness: There is no abdominal tenderness  Musculoskeletal:         General: No tenderness  Normal range of motion        Cervical back: Normal range of motion and neck supple  Right lower leg: No edema  Left lower leg: No edema  Lymphadenopathy:      Cervical: No cervical adenopathy  Upper Body:      Right upper body: No supraclavicular or axillary adenopathy  Left upper body: No supraclavicular or axillary adenopathy  Skin:     General: Skin is warm and dry  Neurological:      Mental Status: She is alert and oriented to person, place, and time  Cranial Nerves: No cranial nerve deficit  Psychiatric:         Mood and Affect: Mood normal          Behavior: Behavior normal        Labs:  Lab Results   Component Value Date    WBC 13 38 (H) 11/28/2022    HGB 14 2 11/28/2022    HCT 43 4 11/28/2022    MCV 98 11/28/2022     (H) 11/28/2022     Lab Results   Component Value Date     09/18/2015    K 4 4 11/15/2022     11/15/2022    CO2 29 11/15/2022    ANIONGAP 10 09/18/2015    BUN 18 11/15/2022    CREATININE 0 86 11/15/2022    GLUCOSE 93 09/18/2015    GLUF 111 (H) 11/15/2022    CALCIUM 9 3 11/15/2022    AST 19 11/15/2022    ALT 27 11/15/2022    ALKPHOS 68 11/15/2022    PROT 8 1 09/18/2015    BILITOT 0 44 09/18/2015    EGFR 71 11/15/2022       Patient voiced understanding and agreement in the above discussion  Aware to contact our office with questions/symptoms in the interim  This note has been generated by voice recognition software system  Therefore, there may be spelling, grammar, and or syntax errors  Please contact if questions arise

## 2022-12-16 ENCOUNTER — ANNUAL EXAM (OUTPATIENT)
Dept: GYNECOLOGY | Facility: CLINIC | Age: 67
End: 2022-12-16

## 2022-12-16 VITALS
HEIGHT: 63 IN | HEART RATE: 87 BPM | SYSTOLIC BLOOD PRESSURE: 120 MMHG | BODY MASS INDEX: 37.17 KG/M2 | DIASTOLIC BLOOD PRESSURE: 72 MMHG | WEIGHT: 209.8 LBS

## 2022-12-16 DIAGNOSIS — M85.80 OSTEOPENIA, UNSPECIFIED LOCATION: ICD-10-CM

## 2022-12-16 DIAGNOSIS — Z01.419 ENCOUNTER FOR GYNECOLOGICAL EXAMINATION WITHOUT ABNORMAL FINDING: ICD-10-CM

## 2022-12-16 DIAGNOSIS — Z17.1 MALIGNANT NEOPLASM OF UPPER-OUTER QUADRANT OF LEFT BREAST IN FEMALE, ESTROGEN RECEPTOR NEGATIVE (HCC): Primary | ICD-10-CM

## 2022-12-16 DIAGNOSIS — C50.412 MALIGNANT NEOPLASM OF UPPER-OUTER QUADRANT OF LEFT BREAST IN FEMALE, ESTROGEN RECEPTOR NEGATIVE (HCC): Primary | ICD-10-CM

## 2022-12-16 DIAGNOSIS — N95.2 ATROPHIC VAGINITIS: ICD-10-CM

## 2022-12-16 NOTE — PROGRESS NOTES
Assessment/Plan:       Diagnoses and all orders for this visit:    Malignant neoplasm of upper-outer quadrant of left breast in female, estrogen receptor negative ([facility] )    Encounter for gynecological examination without abnormal finding    Atrophic vaginitis: asymptomatic-follow    Osteopenia, unspecified location        Subjective:      Patient ID: Genesis Jaimes is a 78 y o  female  HPI patient presents for annual examination  She has a history of stage Ia grade 3 triple negative left breast cancer diagnosed in 2014  Status post left mastectomy with reconstruction  Denies any vaginal irritation, burning discharge or bleeding  Denies any dysuria, hematuria urgency  She has mild infrequent stress urinary incontinence  No GI complaints  Colonoscopy December 2021  Polyps identified  Advised to repeat in 3 years    DEXA scan February 2022: Osteopenia    The following portions of the patient's history were reviewed and updated as appropriate:   She  has a past medical history of Alopecia totalis, Arthritis of knee, Basal cell carcinoma (BCC) of chest, Breast CA ([facility] ) (2014), Cancer ([facility] ) (07/2021), Colon polyp, Disease of thyroid gland, Herpes zoster, History of chemotherapy (2014), Hyperlipidemia, Hypertension, Hypothyroidism, Lymphadenopathy, mediastinal, Menopause, Multiple allergies, Murmur, cardiac, PHN (postherpetic neuralgia), PONV (postoperative nausea and vomiting), Postherpetic neuralgia, and Thrombocytopathy ([facility] )    She   Patient Active Problem List    Diagnosis Date Noted   • Obesity, morbid ([facility] ) 01/18/2022   • Malignant neoplasm of upper-outer quadrant of left breast in female, estrogen receptor negative ([facility] ) 01/18/2022   • Encounter for follow-up examination after completed treatment for malignant neoplasm 11/15/2021   • Hypothyroidism 02/16/2019   • Essential (hemorrhagic) thrombocythemia ([address] ) 10/30/2018   • History of left breast cancer 04/03/2018   • Leukocytosis 04/03/2018     She  has a past surgical history that includes Tubal ligation (Bilateral); [place] tooth extraction; Ovarian cyst removal (2002); Colonoscopy; Replacement total knee (Bilateral); Colonoscopy; Upper gastrointestinal endoscopy; [place] lymph node biopsy (Left, 2014); Excision basal cell carcinoma (08/2021); Breast reconstruction (Left); Breast lumpectomy (Left, 2014); and Mastectomy (Left, 2014)  Her family history includes Bone cancer (age of onset: 61) in her father; Breast cancer in her maternal aunt; Diabetes in her father and sister; Heart attack in her mother; Hypertension in her father; Multiple myeloma (age of onset: 61) in her father; No Known Problems in her daughter, daughter, maternal aunt, maternal aunt, maternal aunt, maternal aunt, maternal aunt, maternal aunt, maternal grandfather, maternal grandmother, paternal grandfather, paternal grandmother, and sister; Other in her maternal uncle; Polycythemia in her sister; Stroke in her mother  She  reports that she quit smoking about 23 years ago  Her smoking use included cigarettes  She has a 13 50 pack-year smoking history  She has never used smokeless tobacco  She reports current alcohol use of about 10 0 standard drinks per week  She reports that she does not use drugs  Current Outpatient Medications   Medication Sig Dispense Refill   • ascorbic acid (VITAMIN C) 500 MG tablet Take by mouth     • aspirin (ECOTRIN LOW STRENGTH) 81 mg EC tablet Take 81 mg by mouth daily     • Calcium Carb-Cholecalciferol 1000-800 MG-UNIT TABS Take by mouth     • Cetirizine HCl 10 MG CAPS Take by mouth     • EPINEPHrine (EPIPEN) 0 3 mg/0 3 mL SOAJ INJECT 0 3 ML (0 3 MG TOTAL) INJECT INTO THE MUSCLE ONE TIME FOR 1 DOSE       • levothyroxine 125 mcg tablet Take 1 tablet (125 mcg total) by mouth daily 90 tablet 3   • lisinopril (ZESTRIL) 20 mg tablet TAKE 1 TABLET BY MOUTH EVERY DAY 90 tablet 1   • Multiple Vitamin (MULTI-DAY VITAMINS) TABS Take by mouth     • Psyllium (METAMUCIL FIBER PO) Take by mouth     • Restasis 0 05 % ophthalmic emulsion      • rosuvastatin (CRESTOR) 20 MG tablet TAKE 1 TABLET BY MOUTH EVERY DAY 90 tablet 1   • tacrolimus (PROTOPIC) 0 1 % ointment APPLY A THIN LAYER TO THE AFFECTED AREA(S) 2 TIMES EVERY DAY RUB IN GENTLY AND COMPLETELY     • VITAMIN E COMPLEX PO Take by mouth     • Zinc Sulfate (ZINC 15 PO) Take by mouth     • loteprednol etabonate (LOTEMAX) 0 5 % ophthalmic suspension        No current facility-administered medications for this visit  Current Outpatient Medications on File Prior to Visit   Medication Sig   • ascorbic acid (VITAMIN C) 500 MG tablet Take by mouth   • aspirin (ECOTRIN LOW STRENGTH) 81 mg EC tablet Take 81 mg by mouth daily   • Calcium Carb-Cholecalciferol 1000-800 MG-UNIT TABS Take by mouth   • Cetirizine HCl 10 MG CAPS Take by mouth   • EPINEPHrine (EPIPEN) 0 3 mg/0 3 mL SOAJ INJECT 0 3 ML (0 3 MG TOTAL) INJECT INTO THE MUSCLE ONE TIME FOR 1 DOSE  • levothyroxine 125 mcg tablet Take 1 tablet (125 mcg total) by mouth daily   • lisinopril (ZESTRIL) 20 mg tablet TAKE 1 TABLET BY MOUTH EVERY DAY   • Multiple Vitamin (MULTI-DAY VITAMINS) TABS Take by mouth   • Psyllium (METAMUCIL FIBER PO) Take by mouth   • Restasis 0 05 % ophthalmic emulsion    • rosuvastatin (CRESTOR) 20 MG tablet TAKE 1 TABLET BY MOUTH EVERY DAY   • tacrolimus (PROTOPIC) 0 1 % ointment APPLY A THIN LAYER TO THE AFFECTED AREA(S) 2 TIMES EVERY DAY RUB IN GENTLY AND COMPLETELY   • VITAMIN E COMPLEX PO Take by mouth   • Zinc Sulfate (ZINC 15 PO) Take by mouth   • loteprednol etabonate (LOTEMAX) 0 5 % ophthalmic suspension      No current facility-administered medications on file prior to visit  She is allergic to clarithromycin, clindamycin, fluticasone, other, sulfanilamide, adhesive [medical tape], penicillins, and sulfa antibiotics       Review of Systems   Constitutional: Negative  HENT: Negative for sore throat and trouble swallowing      Gastrointestinal: Negative  Genitourinary: Negative  Objective:      /72   Pulse 87   Ht 5' 3" (1 6 m)   Wt 95 2 kg (209 lb 12 8 oz)   BMI 37 16 kg/m²          Physical Exam  Vitals reviewed  Constitutional:       Appearance: Normal appearance  Cardiovascular:      Rate and Rhythm: Normal rate and regular rhythm  Pulses: Normal pulses  Heart sounds: Normal heart sounds  No murmur heard  Pulmonary:      Effort: Pulmonary effort is normal  No respiratory distress  Breath sounds: Normal breath sounds  Chest:   Breasts:     Right: No swelling, bleeding, inverted nipple, mass, nipple discharge, skin change or tenderness  Left: Absent  Comments: S//p Lt mastectomy with reconstruction  Abdominal:      General: There is no distension  Palpations: Abdomen is soft  There is no mass  Tenderness: There is no abdominal tenderness  There is no guarding or rebound  Hernia: No hernia is present  There is no hernia in the left inguinal area or right inguinal area  Genitourinary:     General: Normal vulva  Labia:         Right: No rash, tenderness or lesion  Left: No rash, tenderness or lesion  Musculoskeletal:      Cervical back: Normal range of motion and neck supple  No tenderness  Lymphadenopathy:      Cervical: No cervical adenopathy  Upper Body:      Right upper body: No supraclavicular, axillary or pectoral adenopathy  Left upper body: No supraclavicular, axillary or pectoral adenopathy  Lower Body: No right inguinal adenopathy  No left inguinal adenopathy  Neurological:      Mental Status: She is alert

## 2022-12-23 LAB
LAB AP GYN PRIMARY INTERPRETATION: NORMAL
Lab: NORMAL

## 2023-02-02 DIAGNOSIS — E03.9 ACQUIRED HYPOTHYROIDISM: ICD-10-CM

## 2023-02-02 DIAGNOSIS — E78.5 HYPERLIPIDEMIA, UNSPECIFIED HYPERLIPIDEMIA TYPE: ICD-10-CM

## 2023-02-02 RX ORDER — ROSUVASTATIN CALCIUM 20 MG/1
TABLET, COATED ORAL
Qty: 90 TABLET | Refills: 1 | Status: SHIPPED | OUTPATIENT
Start: 2023-02-02

## 2023-02-02 RX ORDER — LEVOTHYROXINE SODIUM 0.12 MG/1
TABLET ORAL
Qty: 90 TABLET | Refills: 3 | Status: SHIPPED | OUTPATIENT
Start: 2023-02-02

## 2023-02-10 RX ORDER — DOXYCYCLINE HYCLATE 20 MG
20 TABLET ORAL 2 TIMES DAILY WITH MEALS
COMMUNITY
Start: 2023-01-23

## 2023-02-14 ENCOUNTER — OFFICE VISIT (OUTPATIENT)
Dept: FAMILY MEDICINE CLINIC | Facility: CLINIC | Age: 68
End: 2023-02-14

## 2023-02-14 VITALS
HEART RATE: 67 BPM | HEIGHT: 63 IN | WEIGHT: 209.8 LBS | DIASTOLIC BLOOD PRESSURE: 78 MMHG | OXYGEN SATURATION: 95 % | TEMPERATURE: 98.3 F | RESPIRATION RATE: 16 BRPM | BODY MASS INDEX: 37.17 KG/M2 | SYSTOLIC BLOOD PRESSURE: 118 MMHG

## 2023-02-14 DIAGNOSIS — I10 HYPERTENSION, UNSPECIFIED TYPE: ICD-10-CM

## 2023-02-14 DIAGNOSIS — H01.00B BLEPHARITIS OF UPPER AND LOWER EYELIDS OF BOTH EYES, UNSPECIFIED TYPE: ICD-10-CM

## 2023-02-14 DIAGNOSIS — E66.01 OBESITY, MORBID (HCC): ICD-10-CM

## 2023-02-14 DIAGNOSIS — C50.412 MALIGNANT NEOPLASM OF UPPER-OUTER QUADRANT OF LEFT BREAST IN FEMALE, ESTROGEN RECEPTOR NEGATIVE (HCC): ICD-10-CM

## 2023-02-14 DIAGNOSIS — H01.00A BLEPHARITIS OF UPPER AND LOWER EYELIDS OF BOTH EYES, UNSPECIFIED TYPE: ICD-10-CM

## 2023-02-14 DIAGNOSIS — E03.9 ACQUIRED HYPOTHYROIDISM: ICD-10-CM

## 2023-02-14 DIAGNOSIS — Z00.00 MEDICARE ANNUAL WELLNESS VISIT, SUBSEQUENT: Primary | ICD-10-CM

## 2023-02-14 DIAGNOSIS — D47.3 ESSENTIAL (HEMORRHAGIC) THROMBOCYTHEMIA (HCC): ICD-10-CM

## 2023-02-14 DIAGNOSIS — E78.5 HYPERLIPIDEMIA, UNSPECIFIED HYPERLIPIDEMIA TYPE: ICD-10-CM

## 2023-02-14 DIAGNOSIS — Z17.1 MALIGNANT NEOPLASM OF UPPER-OUTER QUADRANT OF LEFT BREAST IN FEMALE, ESTROGEN RECEPTOR NEGATIVE (HCC): ICD-10-CM

## 2023-02-14 RX ORDER — LISINOPRIL 20 MG/1
TABLET ORAL
Qty: 90 TABLET | Refills: 1 | Status: SHIPPED | OUTPATIENT
Start: 2023-02-14

## 2023-02-14 RX ORDER — MONTELUKAST SODIUM 10 MG/1
10 TABLET ORAL
Qty: 30 TABLET | Refills: 5 | Status: SHIPPED | OUTPATIENT
Start: 2023-02-14

## 2023-02-14 NOTE — PROGRESS NOTES
Assessment and Plan:     Problem List Items Addressed This Visit        Endocrine    Hypothyroidism       Hematopoietic and Hemostatic    Essential (hemorrhagic) thrombocythemia ([facility] )     Patient has been evaluated by hematology  Watchful observation  Other    Obesity, morbid ([facility] )     Patient has been walking several miles a day  She is watching her diet closely  Malignant neoplasm of upper-outer quadrant of left breast in female, estrogen receptor negative ([facility] )     MITZI  Patient is followed regularly with her breast team        Other Visit Diagnoses     Medicare annual wellness visit, subsequent    -  Primary    Hyperlipidemia, unspecified hyperlipidemia type        Hypertension, unspecified type        Blepharitis of upper and lower eyelids of both eyes, unspecified type        Relevant Medications    montelukast (SINGULAIR) 10 mg tablet        BMI Counseling: Body mass index is 37 16 kg/m²  The BMI is above normal  Exercise recommendations include exercising 3-5 times per week  Rationale for BMI follow-up plan is due to patient being overweight or obese  Patient be walking regularly several miles a day  Depression Screening and Follow-up Plan: Patient was screened for depression during today's encounter  They screened negative with a PHQ-2 score of 0  Preventive health issues were discussed with patient, and age appropriate screening tests were ordered as noted in patient's After Visit Summary  Personalized health advice and appropriate referrals for health education or preventive services given if needed, as noted in patient's After Visit Summary  History of Present Illness:     Patient presents for a Medicare Wellness Visit    Patient with history of hypertension hyperlipidemia hypothyroidism is here to follow-up on chronic medical problems  Blood pressure is excellent today  Patient denies any chest pain shortness of breath lightheadedness palpitation    She is on lisinopril for last couple of years and has not reported any cough  She has been having some periorbital edema which predates starting lisinopril  Patient has seen ophthalmology allergy as well as dermatology for the same  I would suggest to start Singulair in addition to antihistamine to help with allergy component  Patient will following up with her ophthalmologist as well  We will regroup in a month and if her symptoms have not improved we will switch her over from lisinopril to an ARB  Patient understands the plan  I would hold off on giving her pneumonia vaccine today  Her recent hematology consultation reviewed    Patient Care Team:  Leandra Sampson MD as PCP - General (Internal Medicine)  DO Terrell Beatty DO Pandora Sol, MD     Review of Systems:     Review of Systems     Problem List:     Patient Active Problem List   Diagnosis   • History of left breast cancer   • Leukocytosis   • Essential (hemorrhagic) thrombocythemia ([facility] )   • Hypothyroidism   • Encounter for follow-up examination after completed treatment for malignant neoplasm   • Obesity, morbid ([facility] )   • Malignant neoplasm of upper-outer quadrant of left breast in female, estrogen receptor negative ([facility] )      Past Medical and Surgical History:     Past Medical History:   Diagnosis Date   • Alopecia totalis    • Arthritis of knee     bilateral   • Basal cell carcinoma (BCC) of chest    • Breast CA ([facility] ) 2014    Left   • Cancer ([facility] ) 07/2021    Basal cell carcinoma on chest   • Colon polyp    • Disease of thyroid gland    • Herpes zoster    • History of chemotherapy 2014   • Hyperlipidemia    • Hypertension    • Hypothyroidism    • Lymphadenopathy, mediastinal    • Menopause    • Multiple allergies    • Murmur, cardiac    • PHN (postherpetic neuralgia)    • PONV (postoperative nausea and vomiting)    • Postherpetic neuralgia    • Thrombocytopathy [facility])      Past Surgical History:   Procedure Laterality Date   • BASAL CELL CARCINOMA EXCISION  2021   • BREAST LUMPECTOMY Left    • BREAST RECONSTRUCTION Left    • COLONOSCOPY      also needed CT of colon   • COLONOSCOPY     • MASTECTOMY Left    • OVARIAN CYST REMOVAL     • REPLACEMENT TOTAL KNEE Bilateral     R 20, L 20   • SENTINEL LYMPH NODE BIOPSY Left    • TUBAL LIGATION Bilateral    • UPPER GASTROINTESTINAL ENDOSCOPY     • WISDOM TOOTH EXTRACTION        Family History:     Family History   Problem Relation Age of Onset   • Diabetes Father    • Hypertension Father    • Bone cancer Father 61   • Multiple myeloma Father 61   • Diabetes Sister    • Polycythemia Sister    • Heart attack Mother    • Stroke Mother         CVA? • Breast cancer Maternal Aunt         age dx unknown   • Other Maternal Uncle         Brain tumor   • No Known Problems Daughter    • No Known Problems Maternal Grandmother    • No Known Problems Maternal Grandfather    • No Known Problems Paternal Grandmother    • No Known Problems Paternal Grandfather    • No Known Problems Sister    • No Known Problems Daughter    • No Known Problems Maternal Aunt    • No Known Problems Maternal Aunt    • No Known Problems Maternal Aunt    • No Known Problems Maternal Aunt    • No Known Problems Maternal Aunt    • No Known Problems Maternal Aunt       Social History:     Social History     Socioeconomic History   • Marital status: /Civil Union     Spouse name: None   • Number of children: None   • Years of education: None   • Highest education level: None   Occupational History     Employer: [employer]   Tobacco Use   • Smoking status: Former     Packs/day: 0 50     Years: 27 00     Pack years: 13 50     Types: Cigarettes     Quit date:      Years since quittin    • Smokeless tobacco: Never   Vaping Use   • Vaping Use: Never used   Substance and Sexual Activity   • Alcohol use:  Yes     Alcohol/week: 10 0 standard drinks     Types: 10 Standard drinks or equivalent per week   • Drug use: Never   • Sexual activity: Yes     Birth control/protection: Post-menopausal   Other Topics Concern   • None   Social History Narrative   • None     Social Determinants of Health     Financial Resource Strain: Low Risk    • Difficulty of Paying Living Expenses: Not hard at all   Food Insecurity: Not on file   Transportation Needs: No Transportation Needs   • Lack of Transportation (Medical): No   • Lack of Transportation (Non-Medical): No   Physical Activity: Not on file   Stress: Not on file   Social Connections: Not on file   Intimate Partner Violence: Not on file   Housing Stability: Not on file      Medications and Allergies:     Current Outpatient Medications   Medication Sig Dispense Refill   • ascorbic acid (VITAMIN C) 500 MG tablet Take by mouth     • aspirin (ECOTRIN LOW STRENGTH) 81 mg EC tablet Take 81 mg by mouth daily     • Calcium Carb-Cholecalciferol 1000-800 MG-UNIT TABS Take by mouth     • doxycycline (PERIOSTAT) 20 MG tablet Take 20 mg by mouth 2 (two) times a day with meals     • EPINEPHrine (EPIPEN) 0 3 mg/0 3 mL SOAJ INJECT 0 3 ML (0 3 MG TOTAL) INJECT INTO THE MUSCLE ONE TIME FOR 1 DOSE       • levothyroxine 125 mcg tablet TAKE 1 TABLET BY MOUTH EVERY DAY 90 tablet 3   • loteprednol etabonate (LOTEMAX) 0 5 % ophthalmic suspension      • montelukast (SINGULAIR) 10 mg tablet Take 1 tablet (10 mg total) by mouth daily at bedtime 30 tablet 5   • Multiple Vitamin (MULTI-DAY VITAMINS) TABS Take by mouth     • Psyllium (METAMUCIL FIBER PO) Take by mouth     • Restasis 0 05 % ophthalmic emulsion      • rosuvastatin (CRESTOR) 20 MG tablet TAKE 1 TABLET BY MOUTH EVERY DAY 90 tablet 1   • VITAMIN E COMPLEX PO Take by mouth     • Zinc Sulfate (ZINC 15 PO) Take by mouth     • Cetirizine HCl 10 MG CAPS Take by mouth     • lisinopril (ZESTRIL) 20 mg tablet TAKE 1 TABLET BY MOUTH EVERY DAY 90 tablet 1   • tacrolimus (PROTOPIC) 0 1 % ointment APPLY A THIN LAYER TO THE AFFECTED AREA(S) 2 TIMES EVERY DAY RUB IN GENTLY AND COMPLETELY (Patient not taking: Reported on 2/14/2023)       No current facility-administered medications for this visit  Allergies   Allergen Reactions   • Clarithromycin    • Clindamycin Abdominal Pain   • Fluticasone Swelling   • Other    • Sulfanilamide    • Adhesive [Medical Tape] Rash   • Penicillins Rash   • Sulfa Antibiotics Rash      Immunizations:     Immunization History   Administered Date(s) Administered   • COVID-19, unspecified 03/17/2021, 04/14/2021   • INFLUENZA 10/20/2018, 10/22/2019   • Influenza, injectable, quadrivalent, preservative free 0 5 mL 10/20/2020   • Influenza, recombinant, quadrivalent,injectable, preservative free 09/29/2021   • Pneumococcal Conjugate 13-Valent 10/20/2020   • Pneumococcal Polysaccharide PPV23 01/18/2022   • Td (adult), adsorbed 04/22/1998   • Tdap 05/06/2019      Health Maintenance:         Topic Date Due   • Breast Cancer Screening: Mammogram  11/17/2024   • Colorectal Cancer Screening  11/30/2024   • Hepatitis C Screening  Completed         Topic Date Due   • COVID-19 Vaccine (3 - Booster) 06/09/2021   • Influenza Vaccine (1) 09/01/2022      Medicare Screening Tests and Risk Assessments:     Gerardo Rivas is here for her Subsequent Wellness visit  Health Risk Assessment:   Patient rates overall health as very good  Patient feels that their physical health rating is same  Patient is very satisfied with their life  Eyesight was rated as same  Hearing was rated as same  Patient feels that their emotional and mental health rating is same  Patients states they are never, rarely angry  Patient states they are never, rarely unusually tired/fatigued  Pain experienced in the last 7 days has been none  Patient states that she has experienced no weight loss or gain in last 6 months  Depression Screening:   PHQ-2 Score: 0      Fall Risk Screening:    In the past year, patient has experienced: no history of falling in past year      Urinary Incontinence Screening:   Patient has not leaked urine accidently in the last six months  Home Safety:  Patient does not have trouble with stairs inside or outside of their home  Patient has working smoke alarms and has working carbon monoxide detector  Home safety hazards include: none  Nutrition:   Current diet is Low Cholesterol  Medications:   Patient is currently taking over-the-counter supplements  OTC medications include: Vitamins listrd in meds  Patient is able to manage medications  Activities of Daily Living (ADLs)/Instrumental Activities of Daily Living (IADLs):   Walk and transfer into and out of bed and chair?: Yes  Dress and groom yourself?: Yes    Bathe or shower yourself?: Yes    Feed yourself? Yes  Do your laundry/housekeeping?: Yes  Manage your money, pay your bills and track your expenses?: Yes  Make your own meals?: Yes    Do your own shopping?: Yes    Previous Hospitalizations:   Any hospitalizations or ED visits within the last 12 months?: No      Advance Care Planning:   Living will: Yes    Durable POA for healthcare:  Yes    Advanced directive: Yes      Cognitive Screening:   Provider or family/friend/caregiver concerned regarding cognition?: No    PREVENTIVE SCREENINGS      Cardiovascular Screening:    General: Screening Not Indicated and History Lipid Disorder      Diabetes Screening:     General: Screening Current      Colorectal Cancer Screening:     General: Screening Current      Breast Cancer Screening:     General: History Breast Cancer      Cervical Cancer Screening:    General: Screening Not Indicated      Osteoporosis Screening:    General: Screening Current      Abdominal Aortic Aneurysm (AAA) Screening:        General: Screening Not Indicated      Lung Cancer Screening:     General: Screening Not Indicated      Hepatitis C Screening:    General: Screening Current    Screening, Brief Intervention, and Referral to Treatment (SBIRT)    Screening  Typical number of drinks in a day: 1  Typical number of drinks in a week: 7  Interpretation: Low risk drinking behavior  AUDIT-C Screenin) How often did you have a drink containing alcohol in the past year? 4 or more times a week  2) How many drinks did you have on a typical day when you were drinking in the past year? 1 to 2  3) How often did you have 6 or more drinks on one occasion in the past year? never    AUDIT-C Score: 4  Interpretation: Score 3-12 (female): POSITIVE screen for alcohol misuse    AUDIT Screenin) How often during the last year have you found that you were not able to stop drinking once you had started? 0 - never  5) How often during the last year have you failed to do what was normally expected from you because of drinking? 0 - never  6) How often during the last year have you needed a first drink in the morning to get yourself going after a heavy drinking session? 0 - never  7) How often during the last year have you had a feeling of guilt or remorse after drinking? 0 - never  8) How often during the last year have you been unable to remember what happened the night before because you had been drinking? 0 - never  9) Have you or someone else been injured as a result of your drinking? 0 - no  10) Has a relative or friend or a doctor or another health worker been concerned about your drinking or suggested you cut down? 0 - no    AUDIT Score: 4  Interpretation: Low risk alcohol consumption    Single Item Drug Screening:  How often have you used an illegal drug (including marijuana) or a prescription medication for non-medical reasons in the past year? never    Single Item Drug Screen Score: 0  Interpretation: Negative screen for possible drug use disorder    No results found       Physical Exam:     /78 (BP Location: Right arm, Patient Position: Sitting, Cuff Size: Large)   Pulse 67   Temp 98 3 °F (36 8 °C) (Tympanic)   Resp 16   Ht 5' 3" (1 6 m)   Wt 95 2 kg (209 lb 12 8 oz)   SpO2 95%   BMI 37 16 kg/m² Physical Exam  Vitals reviewed  Constitutional:       General: She is not in acute distress  Appearance: She is not diaphoretic  Eyes:      General: No scleral icterus  Comments: Periorbital edema   Neck:      Thyroid: No thyromegaly  Vascular: No carotid bruit  Cardiovascular:      Rate and Rhythm: Normal rate and regular rhythm  Heart sounds: Normal heart sounds  No murmur heard  Pulmonary:      Effort: Pulmonary effort is normal  No respiratory distress  Breath sounds: Normal breath sounds  No stridor  No wheezing or rales  Abdominal:      General: Bowel sounds are normal  There is no distension  Palpations: Abdomen is soft  There is no mass  Tenderness: There is no abdominal tenderness  There is no guarding  Musculoskeletal:      Right lower leg: No edema  Left lower leg: No edema  Lymphadenopathy:      Cervical: No cervical adenopathy  Skin:     General: Skin is warm  Neurological:      Mental Status: She is alert and oriented to person, place, and time  Cranial Nerves: No cranial nerve deficit        Coordination: Coordination normal    Psychiatric:         Behavior: Behavior normal           Rosary Saint, MD

## 2023-03-10 DIAGNOSIS — H01.00B BLEPHARITIS OF UPPER AND LOWER EYELIDS OF BOTH EYES, UNSPECIFIED TYPE: ICD-10-CM

## 2023-03-10 DIAGNOSIS — H01.00A BLEPHARITIS OF UPPER AND LOWER EYELIDS OF BOTH EYES, UNSPECIFIED TYPE: ICD-10-CM

## 2023-03-10 RX ORDER — MONTELUKAST SODIUM 10 MG/1
TABLET ORAL
Qty: 90 TABLET | Refills: 2 | Status: SHIPPED | OUTPATIENT
Start: 2023-03-10

## 2023-03-29 ENCOUNTER — TELEPHONE (OUTPATIENT)
Dept: FAMILY MEDICINE CLINIC | Facility: CLINIC | Age: 68
End: 2023-03-29

## 2023-03-29 NOTE — TELEPHONE ENCOUNTER
Patient called stating that she just got back from vacation that she was out of the country late Monday night  She just found out that 2 people tested Positive for COVID  She is feeling very fatigued/sore throat/chills/body aches  She has no fever and did do a COVID test which was Negative  She wanted to know if that is anything that can be prescribed for her  I did offer a virtual appt to her but she wanted to see what can be prescribed instead    Please advise

## 2023-03-31 ENCOUNTER — TELEPHONE (OUTPATIENT)
Dept: FAMILY MEDICINE CLINIC | Facility: CLINIC | Age: 68
End: 2023-03-31

## 2023-03-31 DIAGNOSIS — U07.1 COVID: Primary | ICD-10-CM

## 2023-03-31 RX ORDER — NIRMATRELVIR AND RITONAVIR 300-100 MG
3 KIT ORAL 2 TIMES DAILY
Qty: 30 TABLET | Refills: 0 | Status: SHIPPED | OUTPATIENT
Start: 2023-03-31 | End: 2023-04-05

## 2023-03-31 NOTE — TELEPHONE ENCOUNTER
Patient called to let Dr Man Mcdermott know that she took a COVID test and it came back Negative  She would like to know what the next step is or if there is something that can be called in for her?   Please advise

## 2023-06-13 ENCOUNTER — TELEPHONE (OUTPATIENT)
Dept: HEMATOLOGY ONCOLOGY | Facility: CLINIC | Age: 68
End: 2023-06-13

## 2023-06-13 ENCOUNTER — APPOINTMENT (OUTPATIENT)
Dept: LAB | Facility: MEDICAL CENTER | Age: 68
End: 2023-06-13
Payer: MEDICARE

## 2023-06-13 DIAGNOSIS — D75.839 THROMBOCYTOSIS: ICD-10-CM

## 2023-06-13 DIAGNOSIS — D72.829 LEUKOCYTOSIS, UNSPECIFIED TYPE: ICD-10-CM

## 2023-06-13 LAB
BASOPHILS # BLD AUTO: 0.1 THOUSANDS/ÂΜL (ref 0–0.1)
BASOPHILS NFR BLD AUTO: 1 % (ref 0–1)
EOSINOPHIL # BLD AUTO: 0.25 THOUSAND/ÂΜL (ref 0–0.61)
EOSINOPHIL NFR BLD AUTO: 2 % (ref 0–6)
ERYTHROCYTE [DISTWIDTH] IN BLOOD BY AUTOMATED COUNT: 14.4 % (ref 11.6–15.1)
HCT VFR BLD AUTO: 44.2 % (ref 34.8–46.1)
HGB BLD-MCNC: 14.2 G/DL (ref 11.5–15.4)
IMM GRANULOCYTES # BLD AUTO: 0.03 THOUSAND/UL (ref 0–0.2)
IMM GRANULOCYTES NFR BLD AUTO: 0 % (ref 0–2)
LYMPHOCYTES # BLD AUTO: 5.11 THOUSANDS/ÂΜL (ref 0.6–4.47)
LYMPHOCYTES NFR BLD AUTO: 40 % (ref 14–44)
MCH RBC QN AUTO: 31.7 PG (ref 26.8–34.3)
MCHC RBC AUTO-ENTMCNC: 32.1 G/DL (ref 31.4–37.4)
MCV RBC AUTO: 99 FL (ref 82–98)
MONOCYTES # BLD AUTO: 1.45 THOUSAND/ÂΜL (ref 0.17–1.22)
MONOCYTES NFR BLD AUTO: 11 % (ref 4–12)
NEUTROPHILS # BLD AUTO: 5.98 THOUSANDS/ÂΜL (ref 1.85–7.62)
NEUTS SEG NFR BLD AUTO: 46 % (ref 43–75)
NRBC BLD AUTO-RTO: 0 /100 WBCS
PLATELET # BLD AUTO: 462 THOUSANDS/UL (ref 149–390)
PMV BLD AUTO: 10.7 FL (ref 8.9–12.7)
RBC # BLD AUTO: 4.48 MILLION/UL (ref 3.81–5.12)
WBC # BLD AUTO: 12.92 THOUSAND/UL (ref 4.31–10.16)

## 2023-06-13 PROCEDURE — 36415 COLL VENOUS BLD VENIPUNCTURE: CPT

## 2023-06-13 PROCEDURE — 85025 COMPLETE CBC W/AUTO DIFF WBC: CPT

## 2023-06-13 NOTE — TELEPHONE ENCOUNTER
Spoke with patient reminding to have labs drawn, requested patient go prior to the afternoon to make sure that we get the results before her appointment, patient verbalized understanding

## 2023-06-14 ENCOUNTER — OFFICE VISIT (OUTPATIENT)
Dept: HEMATOLOGY ONCOLOGY | Facility: CLINIC | Age: 68
End: 2023-06-14
Payer: MEDICARE

## 2023-06-14 VITALS
RESPIRATION RATE: 16 BRPM | DIASTOLIC BLOOD PRESSURE: 78 MMHG | OXYGEN SATURATION: 97 % | HEART RATE: 76 BPM | HEIGHT: 63 IN | SYSTOLIC BLOOD PRESSURE: 118 MMHG | TEMPERATURE: 98.1 F | WEIGHT: 203.5 LBS | BODY MASS INDEX: 36.06 KG/M2

## 2023-06-14 DIAGNOSIS — D72.829 LEUKOCYTOSIS, UNSPECIFIED TYPE: Primary | ICD-10-CM

## 2023-06-14 DIAGNOSIS — D75.839 THROMBOCYTOSIS: ICD-10-CM

## 2023-06-14 PROCEDURE — 99213 OFFICE O/P EST LOW 20 MIN: CPT | Performed by: PHYSICIAN ASSISTANT

## 2023-06-14 NOTE — PROGRESS NOTES
Hematology/Oncology Outpatient Follow-up  Colton Leung 78 y o  female 1955 [id number]    Date:  6/14/2023      Assessment and Plan:  1  Leukocytosis 2  Thrombocytosis  60-year-old female presents for follow-up visit regarding history of chronic leukocytosis and thrombocytosis  11/28/22  JASON-2 negative, Flow cytometry negative  She has also had bone marrow biopsy in the past   This is negative as well  Counts remain stable  Continue to monitor  Follow up in 1 year  - CBC and differential; Future    HPI:  36-year-old female presents for follow-up  Lucille Jack was last seen in November 2020  She is being followed due to history of breast cancer   She had history of stage IA left-sided breast cancer, grade 3, triple negative disease, diagnosed in 2014   She underwent mastectomy resulting in an ED followed by adjuvant chemotherapy, AC followed by paclitaxel   She had remained in observation   She was last seen by Dr Warren salamanca in November 2020  Bushra Vieira recommended follow-up p r n  due to greater than 5 years since diagnosis and to complete imaging with her primary care doctor     She had previously had JAK2 evaluation due to thrombocytosis which was negative      She presents today due to mild leukocytosis      Colonoscopy UTD from Dec 2021  Interval history:    ROS: Review of Systems   Constitutional: Negative for activity change, appetite change, chills, fatigue, fever and unexpected weight change  HENT: Negative for mouth sores and nosebleeds  Respiratory: Negative for cough and shortness of breath  Cardiovascular: Negative for chest pain, palpitations and leg swelling  Gastrointestinal: Negative for abdominal pain, blood in stool, constipation, diarrhea, nausea and vomiting  Genitourinary: Negative for difficulty urinating, dysuria and hematuria  Musculoskeletal: Negative for arthralgias  Skin: Negative  Neurological: Negative for dizziness, weakness, light-headedness, numbness and headaches  Hematological: Negative  Psychiatric/Behavioral: Negative  Past Medical History:   Diagnosis Date   • Alopecia totalis    • Arthritis of knee     bilateral   • Basal cell carcinoma (BCC) of chest    • Breast CA ([facility] ) 2014    Left   • Cancer [facility] ) 2021    Basal cell carcinoma on chest   • Colon polyp    • Disease of thyroid gland    • Herpes zoster    • History of chemotherapy    • Hyperlipidemia    • Hypertension    • Hypothyroidism    • Lymphadenopathy, mediastinal    • Menopause    • Multiple allergies    • Murmur, cardiac    • PHN (postherpetic neuralgia)    • PONV (postoperative nausea and vomiting)    • Postherpetic neuralgia    • Thrombocytopathy [facility])        Past Surgical History:   Procedure Laterality Date   • BASAL CELL CARCINOMA EXCISION  2021   • BREAST LUMPECTOMY Left    • BREAST RECONSTRUCTION Left    • COLONOSCOPY      also needed CT of colon   • COLONOSCOPY     • MASTECTOMY Left    • OVARIAN CYST REMOVAL     • REPLACEMENT TOTAL KNEE Bilateral     R 20, L 20   • SENTINEL LYMPH NODE BIOPSY Left    • TUBAL LIGATION Bilateral    • UPPER GASTROINTESTINAL ENDOSCOPY     • WISDOM TOOTH EXTRACTION         Social History     Socioeconomic History   • Marital status: /Civil Union     Spouse name: Not on file   • Number of children: Not on file   • Years of education: Not on file   • Highest education level: Not on file   Occupational History     Employer: [employer]   Tobacco Use   • Smoking status: Former     Packs/day: 0 50     Years: 27 00     Total pack years: 13 50     Types: Cigarettes     Quit date:      Years since quittin 4   • Smokeless tobacco: Never   Vaping Use   • Vaping Use: Never used   Substance and Sexual Activity   • Alcohol use:  Yes     Alcohol/week: 10 0 standard drinks of alcohol     Types: 10 Standard drinks or equivalent per week   • Drug use: Never   • Sexual activity: Yes     Birth control/protection: Post-menopausal Other Topics Concern   • Not on file   Social History Narrative   • Not on file     Social Determinants of Health     Financial Resource Strain: Low Risk  (2/14/2023)    Overall Financial Resource Strain (CARDIA)    • Difficulty of Paying Living Expenses: Not hard at all   Food Insecurity: No Food Insecurity (12/11/2020)    Hunger Vital Sign    • Worried About Running Out of Food in the Last Year: Never true    • Ran Out of Food in the Last Year: Never true   Transportation Needs: No Transportation Needs (2/14/2023)    PRAPARE - Transportation    • Lack of Transportation (Medical): No    • Lack of Transportation (Non-Medical): No   Physical Activity: Insufficiently Active (11/6/2019)    Exercise Vital Sign    • Days of Exercise per Week: 4 days    • Minutes of Exercise per Session: 30 min   Stress: No Stress Concern Present (11/6/2019)    [address]    • Feeling of Stress : Only a little   Social Connections: Moderately Integrated (11/6/2019)    Social Connection and Isolation Panel [NHANES]    • Frequency of Communication with Friends and Family: More than three times a week    • Frequency of Social Gatherings with Friends and Family: More than three times a week    • Attends [employer] Services: 1 to 4 times per year    • Active Member of Clubs or Organizations: No    • Attends Club or Organization Meetings: Never    • Marital Status:    Intimate Partner Violence: Not At Risk (11/6/2019)    Humiliation, Afraid, Rape, and Kick questionnaire    • Fear of Current or Ex-Partner: No    • Emotionally Abused: No    • Physically Abused: No    • Sexually Abused: No   Housing Stability: Not on file       Family History   Problem Relation Age of Onset   • Diabetes Father    • Hypertension Father    • Bone cancer Father 61   • Multiple myeloma Father 61   • Diabetes Sister    • Polycythemia Sister    • Heart attack Mother    • Stroke Mother         CVA? • Breast cancer Maternal Aunt         age dx unknown   • Other Maternal Uncle         Brain tumor   • No Known Problems Daughter    • No Known Problems Maternal Grandmother    • No Known Problems Maternal Grandfather    • No Known Problems Paternal Grandmother    • No Known Problems Paternal Grandfather    • No Known Problems Sister    • No Known Problems Daughter    • No Known Problems Maternal Aunt    • No Known Problems Maternal Aunt    • No Known Problems Maternal Aunt    • No Known Problems Maternal Aunt    • No Known Problems Maternal Aunt    • No Known Problems Maternal Aunt        Allergies   Allergen Reactions   • Clarithromycin    • Clindamycin Abdominal Pain   • Fluticasone Swelling   • Other    • Sulfanilamide    • Adhesive [Medical Tape] Rash   • Cobalt Rash   • Formaldehyde Rash   • Gold Sodium Thiomalate Rash   • Nickel Rash   • Penicillins Rash   • Propolis Rash   • Sulfa Antibiotics Rash         Current Outpatient Medications:   •  ascorbic acid (VITAMIN C) 500 MG tablet, Take by mouth, Disp: , Rfl:   •  aspirin (ECOTRIN LOW STRENGTH) 81 mg EC tablet, Take 81 mg by mouth daily, Disp: , Rfl:   •  Calcium Carb-Cholecalciferol 1000-800 MG-UNIT TABS, Take by mouth, Disp: , Rfl:   •  Cetirizine HCl 10 MG CAPS, Take by mouth, Disp: , Rfl:   •  doxycycline (PERIOSTAT) 20 MG tablet, Take 20 mg by mouth 2 (two) times a day with meals, Disp: , Rfl:   •  EPINEPHrine (EPIPEN) 0 3 mg/0 3 mL SOAJ, INJECT 0 3 ML (0 3 MG TOTAL) INJECT INTO THE MUSCLE ONE TIME FOR 1 DOSE , Disp: , Rfl:   •  levothyroxine 125 mcg tablet, TAKE 1 TABLET BY MOUTH EVERY DAY, Disp: 90 tablet, Rfl: 3  •  lisinopril (ZESTRIL) 20 mg tablet, TAKE 1 TABLET BY MOUTH EVERY DAY, Disp: 90 tablet, Rfl: 1  •  loteprednol etabonate (LOTEMAX) 0 5 % ophthalmic suspension, , Disp: , Rfl:   •  montelukast (SINGULAIR) 10 mg tablet, TAKE 1 TABLET BY MOUTH DAILY AT BEDTIME, Disp: 90 tablet, Rfl: 2  •  Multiple Vitamin (MULTI-DAY VITAMINS) TABS, Take by "mouth, Disp: , Rfl:   •  Psyllium (METAMUCIL FIBER PO), Take by mouth, Disp: , Rfl:   •  Restasis 0 05 % ophthalmic emulsion, , Disp: , Rfl:   •  rosuvastatin (CRESTOR) 20 MG tablet, TAKE 1 TABLET BY MOUTH EVERY DAY, Disp: 90 tablet, Rfl: 1  •  VITAMIN E COMPLEX PO, Take by mouth, Disp: , Rfl:   •  Zinc Sulfate (ZINC 15 PO), Take by mouth, Disp: , Rfl:   •  tacrolimus (PROTOPIC) 0 1 % ointment, APPLY A THIN LAYER TO THE AFFECTED AREA(S) 2 TIMES EVERY DAY RUB IN GENTLY AND COMPLETELY (Patient not taking: Reported on 2/14/2023), Disp: , Rfl:       Physical Exam:  /78 (BP Location: Left arm, Patient Position: Sitting, Cuff Size: Adult)   Pulse 76   Temp 98 1 °F (36 7 °C) (Temporal)   Resp 16   Ht 5' 3\" (1 6 m)   Wt 92 3 kg (203 lb 8 oz)   SpO2 97%   BMI 36 05 kg/m²     Physical Exam  Vitals reviewed  Constitutional:       General: She is not in acute distress  Appearance: She is well-developed  She is not ill-appearing  HENT:      Head: Normocephalic and atraumatic  Eyes:      General: No scleral icterus  Conjunctiva/sclera: Conjunctivae normal    Cardiovascular:      Rate and Rhythm: Normal rate and regular rhythm  Heart sounds: Normal heart sounds  No murmur heard  Pulmonary:      Effort: Pulmonary effort is normal  No respiratory distress  Breath sounds: Normal breath sounds  Abdominal:      Palpations: Abdomen is soft  Tenderness: There is no abdominal tenderness  Musculoskeletal:         General: No tenderness  Normal range of motion  Cervical back: Normal range of motion and neck supple  Right lower leg: No edema  Left lower leg: No edema  Lymphadenopathy:      Cervical: No cervical adenopathy  Skin:     General: Skin is warm and dry  Neurological:      Mental Status: She is alert and oriented to person, place, and time  Cranial Nerves: No cranial nerve deficit     Psychiatric:         Mood and Affect: Mood normal          Behavior: Behavior " normal        Labs:  Lab Results   Component Value Date    HCT 44 2 06/13/2023    HGB 14 2 06/13/2023    MCV 99 (H) 06/13/2023     (H) 06/13/2023    WBC 12 92 (H) 06/13/2023     I have spent 20 minutes with Patient  today in which greater than 50% of this time was spent in counseling/coordination of care regarding Diagnostic results, Impressions, Documenting in the medical record, Reviewing / ordering tests, medicine, procedures   and Obtaining or reviewing history    Patient voiced understanding and agreement in the above discussion  Aware to contact our office with questions/symptoms in the interim  This note has been generated by voice recognition software system  Therefore, there may be spelling, grammar, and or syntax errors  Please contact if questions arise

## 2023-08-02 DIAGNOSIS — I10 HYPERTENSION, UNSPECIFIED TYPE: ICD-10-CM

## 2023-08-02 RX ORDER — LISINOPRIL 20 MG/1
TABLET ORAL
Qty: 90 TABLET | Refills: 1 | Status: SHIPPED | OUTPATIENT
Start: 2023-08-02

## 2023-08-03 DIAGNOSIS — E78.5 HYPERLIPIDEMIA, UNSPECIFIED HYPERLIPIDEMIA TYPE: ICD-10-CM

## 2023-08-03 RX ORDER — ROSUVASTATIN CALCIUM 20 MG/1
TABLET, COATED ORAL
Qty: 90 TABLET | Refills: 1 | Status: SHIPPED | OUTPATIENT
Start: 2023-08-03

## 2023-08-09 ENCOUNTER — EVALUATION (OUTPATIENT)
Dept: PHYSICAL THERAPY | Facility: CLINIC | Age: 68
End: 2023-08-09
Payer: MEDICARE

## 2023-08-09 DIAGNOSIS — Z85.3 HISTORY OF LEFT BREAST CANCER: Primary | ICD-10-CM

## 2023-08-09 PROCEDURE — 97163 PT EVAL HIGH COMPLEX 45 MIN: CPT | Performed by: PHYSICAL THERAPIST

## 2023-08-09 NOTE — PROGRESS NOTES
PT Evaluation    Today's date: 2023  Patient name: Jerson Santos  : 1955  MRN: [id number]  Referring provider: HENRIK Bustamante  Dx:   Encounter Diagnosis     ICD-10-CM    1. History of left breast cancer  Z85.3 Ambulatory referral to Physical Therapy                   Lymphedema Physical Assessment    SUBJECTIVE EVALUATION:  History of present illness:  Pt seen this day for assessment as she recently had f/u w/ CA survivorship and had some concern regarding swelling/edema presence in UE. She has hx of breast cancer, stage IA left-sided breast cancer, grade 3, triple negative disease, diagnosed in .  She underwent mastectomy resulting in an ED followed by adjuvant chemotherapy. Pt reports she notices some swelling in her L forearm to elbow. Pain (0-10): 0-1/10   Location: L UE    OBJECTIVE:    Pulse: (0 no, 1+ diminished, 2+ mildly diminished, 3+ normal, 4 bounding) normal radial 3+    Dorsal Pedal:  Posterior Tibial:    Capillary Refill (=< 3 seconds is normal): normal    Palpation: Mild heaviness noted thru forearm, elbow, and triceps region of L UE    Pitting: Trace    Stemmer's Sign: Neg    Gait assessment: WNL    Transfer status: I    Ability to don/doff clothing/garments:  I    LE ROM: n/a  Knee flexion limitation:  Ankle DF/PF limitation:    UE ROM: R WNL  Shldr OH flex:  L 178 deg A, 180 P  Shldr OH abd: L 174 deg A, 180 P  Shldr ER: L 88 deg A, 90 P  Shldr IR: 60 deg A, 68 deg P  Elbow flex/ext: WNL  Wrist flex/ext: WNL    Strength:  UE: Good, 4+ to 5/5 t/o  LE:    Self management:  Home compression pump: no  Compression stockings: arm sleeve for travel  Lymphedema exercise: no    Skin Assessment:  Skin mobility:  Good, mild heaviness in forearm region/tautness to forearm and triceps areas  Fibrosis (0 normal - 4 >severe): 0-1  Erythema scale (1 very faint, 2 faint, 3 bright, 4 very bright): 1  Hemosiderin staining present: no  Blister present: no   Location:   Color:   Size:  Wound present: no   Location:   Color:   Size:    Visualization:  Bony prominences:  Good, ulnar and radial prominences well visualized  Tendon pathways: Good, WNL and comparable to unaffected side thru dorsum of hand  Joint creases: Good, no visible accumulation of edema thru fingers of L hand    Lymphedema classification (0 latent, 1 reverse, 2 non-rev, 3 elephantiasis): 0-1    PMHx:  Cellulitis: no   Most recent:  Cardiac: no  Renal: no  Hepatic: no  Cancer: Yes   Type: L breast   Treatment: L breast mastectomy w/ reconstruction, adjuvant chemo  Surgical: no       UPPER EXTREMITY LEFT CALCULATIONS    Flowsheet Row Most Recent Value   Volume UE (mL) 3136   Difference from last visit (mL)  -3136   Difference from first visit (mL)  -3136      UPPER EXTREMITY RIGHT CALCULATIONS    Flowsheet Row Most Recent Value   Volume UE (mL) 3026   Difference from last visit (mL)  -3026   Difference from first visit (mL)  -3026          >> REFER TO FLOW SHEET FOR GIRTH MEASUREMENTS <<    ASSESSMENT:  Assessment details:  Pt presents this day for assessment given PHMx of L breast CA w/ mastectomy, reconstruction and chemotherapy, no radiation. During survivorship f/u pt reported some heaviness/edema presence of L UE and was referred for assessment. Measurements this day suggest a mild vol difference L>R UE w/ notable heaviness palpable thru forearm and into triceps regions. Pt does have a sleeve to utilize for air travel. I recommend f/u visit to educate on self MLD techniques, to review fitment of sleeve, and to address mild edema concerns. Goals:  ST. Pt educated in self MLD within 2 wks  2. Pt fitted for proper compression sleeve within 2 wks  3. Reduce L UE volume >2% within 3 wks    LT. Pt I in self MLD and phase 2 CDT protocol within 6 wks  2. L UE volume = R within 6 wks  3.  Use of L UE compression sleeve prn within 6 wks    Lymphedema Life Impact Scale    Score: NV  % Impairment:  Infection Occurrence:    FOTO score: n/a    PLAN:  Patient would benefit from: skilled physical therapy  Planned therapy interventions: manual therapy, massage, compression, home exercise program and patient education  Frequency: 1x/wk  Duration in visits: 6  Duration in weeks: 6  Plan of Care beginning date: 8/9/2023  Plan of Care expiration date: 9/22/2023  Treatment plan discussed with: patient

## 2023-08-17 NOTE — PROGRESS NOTES
OSW placed outreach TC to complete the distress thermometer and psychosocial assessment  OSW left a detailed VM and requested a return TC  Call back information was provided 
bilateral lower extremity ROM was WFL (within functional limits)

## 2023-08-21 ENCOUNTER — OFFICE VISIT (OUTPATIENT)
Dept: PHYSICAL THERAPY | Facility: CLINIC | Age: 68
End: 2023-08-21
Payer: MEDICARE

## 2023-08-21 DIAGNOSIS — Z85.3 HISTORY OF LEFT BREAST CANCER: Primary | ICD-10-CM

## 2023-08-21 PROCEDURE — 97140 MANUAL THERAPY 1/> REGIONS: CPT | Performed by: PHYSICAL THERAPIST

## 2023-08-21 NOTE — PROGRESS NOTES
Daily Note     Today's date: 2023  Patient name: Ag German  : 1955  MRN: [id number]  Referring provider: HENRIK Barnett  Dx:   Encounter Diagnosis     ICD-10-CM    1. History of left breast cancer  Z85.3                      Subjective: Pt presents to initiate tx this day citing cont;d heaviness thru L forearm region. Does present w/ arm sleeve this day for review. Objective: See treatment diary below      Assessment: Tolerated treatment well. Patient would benefit from continued PT  Test fit pt's previous arm sleeve found to be a size long, despite pt's arm length being regular to short. Advised pt can utilize this for short term but advise new arm sleeve garment w/ plan to obtain prior to flight Oct 10th. Reviewed MLD techniques including self MLD, provided 2.75" TG to forearm and advised use today then sleeve daily combined w/ daily MLD. Will f/u 9-5. Plan: Continue per plan of care.          Precautions: Hx CA, s/p L mastectomy      Manuals 8-21            MLD L UE jp            Self MLD instruction jp            Compression TG 2.75" forearm                         Neuro Re-Ed                                                                                                        Ther Ex                                                                                                                     Ther Activity                                       Gait Training                                       Modalities

## 2023-08-28 ENCOUNTER — RA CDI HCC (OUTPATIENT)
Dept: OTHER | Facility: HOSPITAL | Age: 68
End: 2023-08-28

## 2023-09-05 ENCOUNTER — OFFICE VISIT (OUTPATIENT)
Dept: PHYSICAL THERAPY | Facility: CLINIC | Age: 68
End: 2023-09-05
Payer: MEDICARE

## 2023-09-05 DIAGNOSIS — Z85.3 HISTORY OF LEFT BREAST CANCER: Primary | ICD-10-CM

## 2023-09-05 PROCEDURE — 97140 MANUAL THERAPY 1/> REGIONS: CPT | Performed by: PHYSICAL THERAPIST

## 2023-09-05 NOTE — PROGRESS NOTES
Daily Note     Today's date: 2023  Patient name: Elly Jackson  : 1955  MRN: [id number]  Referring provider: HENRIK Richter  Dx:   Encounter Diagnosis     ICD-10-CM    1. History of left breast cancer  Z85.3                      Subjective: Pt presents admitting sporadic use of TG sleeve, isn't using when performing yardwork and such to avoid getting it dirty. Pt's  present today to review MLD techniques. Objective: See treatment diary below  UPPER EXTREMITY LEFT CALCULATIONS    Flowsheet Row Most Recent Value   Volume UE (mL) 2956   Difference from last visit (mL)  180   Difference from first visit (mL)  180   Difference from last visit (%)  6   Difference from first visit (%)  6            Assessment: Tolerated treatment well. Patient would benefit from continued PT  T2.75" TG utilized again this day, provided new for home, prefer new arm sleeve, will determine exact size as vol reduces, measurements this day showing good initial reduction. Will RA in 2 wks. Educated in self and assisted MLD for home. Plan: Continue per plan of care. Precautions: Hx CA, s/p L mastectomy      Manuals 8-21 9-5           MLD L UE [facility]           Self MLD instruction [facility], eduardo instr.            Compression TG 2.75" forearm TG 2.75" forearm                        Neuro Re-Ed                                                                                                        Ther Ex                                                                                                                     Ther Activity                                       Gait Training                                       Modalities

## 2023-09-21 ENCOUNTER — OFFICE VISIT (OUTPATIENT)
Dept: PHYSICAL THERAPY | Facility: CLINIC | Age: 68
End: 2023-09-21
Payer: MEDICARE

## 2023-09-21 DIAGNOSIS — Z85.3 HISTORY OF LEFT BREAST CANCER: Primary | ICD-10-CM

## 2023-09-21 PROCEDURE — 97140 MANUAL THERAPY 1/> REGIONS: CPT | Performed by: PHYSICAL THERAPIST

## 2023-09-21 NOTE — PROGRESS NOTES
Daily Note     Today's date: 2023  Patient name: Luis A Collins  : 1955  MRN: [id number]  Referring provider: HENRIK Ley Cera  Dx:   Encounter Diagnosis     ICD-10-CM    1. History of left breast cancer  Z85.3                      Subjective: Pt presents reporting consistent use of compression TG since last visit, seeing improvement. Objective: See treatment diary below    UPPER EXTREMITY LEFT CALCULATIONS    Flowsheet Row Most Recent Value   Volume UE (mL) 2750   Difference from last visit (mL)  206   Difference from first visit (mL)  386   Difference from last visit (%)  7   Difference from first visit (%)  12            Assessment: Tolerated treatment well. Patient would benefit from continued PT  T2.75" TG provided x 2 w/ plan to f/u Oct 19th and , pt will maintain self MLD and compression garments, will be fit when appropriate for compression sleeve/gauntlet. Measurements this day show good progression toward established goals in vol reduction. Plan: Continue per plan of care. Precautions: Hx CA, s/p L mastectomy      Manuals - 9-5 -          MLD L UE [facility]          Self MLD instruction [facility], fam instr.            Compression TG 2.75" forearm TG 2.75" forearm TG 2.75" forearm                       Neuro Re-Ed                                                                                                        Ther Ex                                                                                                                     Ther Activity                                       Gait Training                                       Modalities

## 2023-10-02 ENCOUNTER — OFFICE VISIT (OUTPATIENT)
Dept: FAMILY MEDICINE CLINIC | Facility: CLINIC | Age: 68
End: 2023-10-02
Payer: MEDICARE

## 2023-10-02 VITALS
OXYGEN SATURATION: 96 % | WEIGHT: 203 LBS | HEART RATE: 73 BPM | HEIGHT: 63 IN | SYSTOLIC BLOOD PRESSURE: 108 MMHG | DIASTOLIC BLOOD PRESSURE: 78 MMHG | BODY MASS INDEX: 35.97 KG/M2 | RESPIRATION RATE: 16 BRPM | TEMPERATURE: 99.3 F

## 2023-10-02 DIAGNOSIS — E78.5 HYPERLIPIDEMIA, UNSPECIFIED HYPERLIPIDEMIA TYPE: ICD-10-CM

## 2023-10-02 DIAGNOSIS — Z88.9 MULTIPLE ALLERGIES: ICD-10-CM

## 2023-10-02 DIAGNOSIS — E03.9 ACQUIRED HYPOTHYROIDISM: ICD-10-CM

## 2023-10-02 DIAGNOSIS — Z79.899 LONG TERM USE OF DRUG: ICD-10-CM

## 2023-10-02 DIAGNOSIS — Z23 IMMUNIZATION DUE: ICD-10-CM

## 2023-10-02 DIAGNOSIS — I10 HYPERTENSION, UNSPECIFIED TYPE: Primary | ICD-10-CM

## 2023-10-02 PROCEDURE — 90677 PCV20 VACCINE IM: CPT

## 2023-10-02 PROCEDURE — 99214 OFFICE O/P EST MOD 30 MIN: CPT | Performed by: INTERNAL MEDICINE

## 2023-10-02 PROCEDURE — G0009 ADMIN PNEUMOCOCCAL VACCINE: HCPCS

## 2023-10-02 RX ORDER — AMOXICILLIN 500 MG
CAPSULE ORAL
COMMUNITY
Start: 2023-07-01

## 2023-10-02 NOTE — PROGRESS NOTES
Assessment/Plan:        Problem List Items Addressed This Visit        Endocrine    Hypothyroidism    Relevant Orders    TSH, 3rd generation   Other Visit Diagnoses     Hypertension, unspecified type    -  Primary    Relevant Orders    Comprehensive metabolic panel    UA (URINE) with reflex to Scope    Hyperlipidemia, unspecified hyperlipidemia type        Relevant Orders    Comprehensive metabolic panel    Lipid panel    Immunization due        Relevant Orders    Pneumococcal Conjugate Vaccine 20-valent (Pcv20) (Completed)    Long term use of drug        Relevant Orders    Vitamin D Panel    Multiple allergies              Patient received Prevnar 20 today. She will go for RSV vaccination. She already had flu and COVID. Lab studies ordered. I will see her back in 4 to 6 months    Subjective:      Patient ID: Ayesha Calero is a 78 y.o. female. HPI  Patient with history of hypertension,hypothyroidism hyperlipidemia is here to follow-up on chronic medical problems. Blood pressure is good today. She denies any chest pain shortness of breath lightheadedness palpitation. She is suffering from allergies and is seen an allergist and had patch testing done. She is allergic to frequencies for which she has made several changes in the house. She is on Zyrtec as well as takes Singulair. She will be following up with her allergist soon. She is following up with hematology for persistent leukocytosis as well as thrombocytosis. She had breast cancer several years ago and had echocardiogram prior to so which was unremarkable. Recent mammogram was unremarkable. She will be getting colonoscopy next year  Routine labs will be due next month. Orders will be placed in the chart.   The following portions of the patient's history were reviewed and updated as appropriate: allergies, current medications, past medical history, past social history, past surgical history and problem list.    Review of Systems Objective:      /78   Pulse 73   Temp 99.3 °F (37.4 °C)   Resp 16   Ht 5' 3" (1.6 m)   Wt 92.1 kg (203 lb)   SpO2 96%   BMI 35.96 kg/m²          Physical Exam

## 2023-10-19 ENCOUNTER — OFFICE VISIT (OUTPATIENT)
Dept: PHYSICAL THERAPY | Facility: CLINIC | Age: 68
End: 2023-10-19
Payer: MEDICARE

## 2023-10-19 DIAGNOSIS — Z85.3 HISTORY OF LEFT BREAST CANCER: Primary | ICD-10-CM

## 2023-10-19 PROCEDURE — 97140 MANUAL THERAPY 1/> REGIONS: CPT | Performed by: PHYSICAL THERAPIST

## 2023-10-19 NOTE — PROGRESS NOTES
PT Re-Evaluation     Today's date: 10/19/2023  Patient name: Luis A Collins  : 1955  MRN: [id number]  Referring provider: HENRIK Ley Cera  Dx:   Encounter Diagnosis     ICD-10-CM    1. History of left breast cancer  Z85.3                      Lymphedema Physical Assessment    SUBJECTIVE EVALUATION:  History of present illness:  Pt seen for assessment as she recently had f/u w/ CA survivorship and had some concern regarding swelling/edema presence in UE. She has hx of breast cancer, stage IA left-sided breast cancer, grade 3, triple negative disease, diagnosed in . She underwent mastectomy resulting in an ED followed by adjuvant chemotherapy. Pt reports she notices some swelling in her L forearm to elbow. 10/19/2023 Update: Pt seen for RA this day admitting recent air travel on vacation so she also was not compliant w/ arm compression and admits a diet atypical from her norm. She agrees this all likely affected her arm edema as she notices it is heavier and likely diminished the initial improvement seen w/ skilled PT tx. Pain (0-10): 0-1/10   Location: L UE    OBJECTIVE:    Pulse: (0 no, 1+ diminished, 2+ mildly diminished, 3+ normal, 4 bounding) normal radial 3+    Dorsal Pedal:  Posterior Tibial:    Capillary Refill (=< 3 seconds is normal): normal    Palpation: Mild heaviness noted thru forearm, elbow, and triceps region of L UE    Pitting: Trace    Stemmer's Sign: Neg    Gait assessment: WNL    Transfer status: I    Ability to don/doff clothing/garments:  I    LE ROM: n/a  Knee flexion limitation:  Ankle DF/PF limitation:    UE ROM: R WNL  Shldr OH flex:  L 178 deg A, 180 P  Shldr OH abd: L 174 deg A, 180 P  Shldr ER: L 88 deg A, 90 P  Shldr IR: 60 deg A, 68 deg P  Elbow flex/ext: WNL  Wrist flex/ext: WNL    Strength:  UE: Good, 4+ to 5/5 t/o  LE:    Self management:  Home compression pump: no  Compression stockings: arm sleeve for travel  Lymphedema exercise: no    Skin Assessment:  Skin mobility:  Good, mild heaviness in forearm region/tautness to forearm and triceps areas  Fibrosis (0 normal - 4 >severe): 0-1  Erythema scale (1 very faint, 2 faint, 3 bright, 4 very bright): 1  Hemosiderin staining present: no  Blister present: no   Location:   Color:   Size:  Wound present: no   Location:   Color:   Size:    Visualization:  Bony prominences:  Good, ulnar and radial prominences well visualized  Tendon pathways: Good, WNL and comparable to unaffected side thru dorsum of hand  Joint creases: Good, no visible accumulation of edema thru fingers of L hand    Lymphedema classification (0 latent, 1 reverse, 2 non-rev, 3 elephantiasis): 1    PMHx:  Cellulitis: no   Most recent:  Cardiac: no  Renal: no  Hepatic: no  Cancer: Yes   Type: L breast   Treatment: L breast mastectomy w/ reconstruction, adjuvant chemo  Surgical: no       UPPER EXTREMITY LEFT CALCULATIONS      Flowsheet Row Most Recent Value   Volume UE (mL) 2913   Difference from last visit (mL)  -163   Difference from first visit (mL)  223   Difference from last visit (%)  -6   Difference from first visit (%)  7            >> REFER TO FLOW SHEET FOR GIRTH MEASUREMENTS <<    ASSESSMENT:  Assessment details:  Pt RA this day regarding ongoing L UE lymphedema w/ overall a volume reduction achieved, however pt had previosuly achieved greater reduction thru L UE before vacation out of the country which entailed flying. Pt has maintained 2.75" TG thru hand, wrist, and forearm however will benefit from more effective compression of entire L arm as measurements this day suggest increase thru L upper arm. Air travel, diet, and limited use of compression this past wk leading up to RA are likely contributing factors. Will re-measure NV and compare results, consider bandaging to achieve optimal reduction and fit then for arm sleeve and gauntlet. Requires cont'd skilled PT to address and achieve established goals.       Goals:  ST. Pt educated in self MLD within 2 wks-MET  2. Pt fitted for proper compression sleeve within 2 wks-Not met  3. Reduce L UE volume >2% within 3 wks-MET    LT. Pt I in self MLD and phase 2 CDT protocol within 6 wks-In progress  2. L UE volume = R within 6 wks-In progress  3. Use of L UE compression sleeve prn within 6 wks-Partially met    Lymphedema Life Impact Scale    Score: NV  % Impairment:  Infection Occurrence:    FOTO score: n/a    PLAN:  Patient would benefit from: skilled physical therapy  Planned therapy interventions: manual therapy, massage, compression, home exercise program and patient education  Frequency: 1x/wk  Duration in visits: 6  Duration in weeks: 6  Plan of Care beginning date: 10/19/2023  Plan of Care expiration date: 2023  Treatment plan discussed with: patient            Precautions: Hx CA, s/p L mastectomy      Manuals  9-5 9-21 10-19         MLD L UE [facility]         Self MLD instruction [facility], eduardo instr.            Compression TG 2.75" forearm TG 2.75" forearm TG 2.75" forearm TG 2,75" forearm         Measurements    [facility]         Neuro Re-Ed                                                                                                        Ther Ex                                                                                                                     Ther Activity                                       Gait Training                                       Modalities

## 2023-11-01 DIAGNOSIS — H01.00A BLEPHARITIS OF UPPER AND LOWER EYELIDS OF BOTH EYES, UNSPECIFIED TYPE: ICD-10-CM

## 2023-11-01 DIAGNOSIS — H01.00B BLEPHARITIS OF UPPER AND LOWER EYELIDS OF BOTH EYES, UNSPECIFIED TYPE: ICD-10-CM

## 2023-11-01 RX ORDER — MONTELUKAST SODIUM 10 MG/1
10 TABLET ORAL
Qty: 90 TABLET | Refills: 2 | Status: SHIPPED | OUTPATIENT
Start: 2023-11-01

## 2023-11-09 ENCOUNTER — OFFICE VISIT (OUTPATIENT)
Dept: PHYSICAL THERAPY | Facility: CLINIC | Age: 68
End: 2023-11-09
Payer: MEDICARE

## 2023-11-09 DIAGNOSIS — Z85.3 HISTORY OF LEFT BREAST CANCER: Primary | ICD-10-CM

## 2023-11-09 PROCEDURE — 97140 MANUAL THERAPY 1/> REGIONS: CPT | Performed by: PHYSICAL THERAPIST

## 2023-11-09 NOTE — PROGRESS NOTES
Daily Note     Today's date: 2023  Patient name: Sj Goodwin  : 1955  MRN: [id number]  Referring provider: HENRIK Ng  Dx:   Encounter Diagnosis     ICD-10-CM    1. History of left breast cancer  Z85.3                      Subjective: Pt reports diligence to use of arm sleeve since last visit, believes it is doing better. Objective: See treatment diary below  UPPER EXTREMITY LEFT CALCULATIONS      Flowsheet Row Most Recent Value   Volume UE (mL) 2821   Difference from last visit (mL)  92   Difference from first visit (mL)  315   Difference from last visit (%)  3   Difference from first visit (%)  10              Assessment: Tolerated treatment well. Patient would benefit from continued PT  More consistent use of compression yielding more + results seeing overall vol reduction, not yet back to lowest reduction. Cont'd skilled care necessary. Plan: Continue per plan of care. Precautions: Hx CA, s/p L mastectomy      Manuals 8-21 9-5 9-21 10-19 11-9        MLD L UE [facility]        Self MLD instruction [facility] instr.            Compression TG 2.75" forearm TG 2.75" forearm TG 2.75" forearm TG 2,75" forearm Arm sleeve        Measurements    [facility]        Neuro Re-Ed                                                                                                        Ther Ex                                                                                                                     Ther Activity                                       Gait Training                                       Modalities

## 2023-11-16 ENCOUNTER — APPOINTMENT (OUTPATIENT)
Dept: LAB | Facility: MEDICAL CENTER | Age: 68
End: 2023-11-16
Payer: MEDICARE

## 2023-11-16 DIAGNOSIS — E03.9 ACQUIRED HYPOTHYROIDISM: ICD-10-CM

## 2023-11-16 LAB
ALBUMIN SERPL BCP-MCNC: 4.1 G/DL (ref 3.5–5)
ALP SERPL-CCNC: 59 U/L (ref 34–104)
ALT SERPL W P-5'-P-CCNC: 18 U/L (ref 7–52)
ANION GAP SERPL CALCULATED.3IONS-SCNC: 7 MMOL/L
AST SERPL W P-5'-P-CCNC: 21 U/L (ref 13–39)
BILIRUB SERPL-MCNC: 0.57 MG/DL (ref 0.2–1)
BILIRUB UR QL STRIP: NEGATIVE
BUN SERPL-MCNC: 17 MG/DL (ref 5–25)
CALCIUM SERPL-MCNC: 9.3 MG/DL (ref 8.4–10.2)
CHLORIDE SERPL-SCNC: 103 MMOL/L (ref 96–108)
CHOLEST SERPL-MCNC: 117 MG/DL
CLARITY UR: CLEAR
CO2 SERPL-SCNC: 28 MMOL/L (ref 21–32)
COLOR UR: NORMAL
CREAT SERPL-MCNC: 0.77 MG/DL (ref 0.6–1.3)
GFR SERPL CREATININE-BSD FRML MDRD: 80 ML/MIN/1.73SQ M
GLUCOSE P FAST SERPL-MCNC: 91 MG/DL (ref 65–99)
GLUCOSE UR STRIP-MCNC: NEGATIVE MG/DL
HDLC SERPL-MCNC: 30 MG/DL
HGB UR QL STRIP.AUTO: NEGATIVE
KETONES UR STRIP-MCNC: NEGATIVE MG/DL
LDLC SERPL CALC-MCNC: 35 MG/DL (ref 0–100)
LEUKOCYTE ESTERASE UR QL STRIP: NEGATIVE
NITRITE UR QL STRIP: NEGATIVE
NONHDLC SERPL-MCNC: 87 MG/DL
PH UR STRIP.AUTO: 6.5 [PH]
POTASSIUM SERPL-SCNC: 4.3 MMOL/L (ref 3.5–5.3)
PROT SERPL-MCNC: 6.8 G/DL (ref 6.4–8.4)
PROT UR STRIP-MCNC: NEGATIVE MG/DL
SODIUM SERPL-SCNC: 138 MMOL/L (ref 135–147)
SP GR UR STRIP.AUTO: 1.01 (ref 1–1.03)
TRIGL SERPL-MCNC: 260 MG/DL
TSH SERPL DL<=0.05 MIU/L-ACNC: 4.89 UIU/ML (ref 0.45–4.5)
UROBILINOGEN UR STRIP-ACNC: <2 MG/DL

## 2023-11-16 RX ORDER — LEVOTHYROXINE SODIUM 137 UG/1
125 TABLET ORAL DAILY
Qty: 60 TABLET | Refills: 1 | Status: SHIPPED | OUTPATIENT
Start: 2023-11-16

## 2023-11-20 ENCOUNTER — HOSPITAL ENCOUNTER (OUTPATIENT)
Dept: MAMMOGRAPHY | Facility: MEDICAL CENTER | Age: 68
Discharge: HOME | End: 2023-11-20
Payer: MEDICARE

## 2023-11-20 VITALS — WEIGHT: 203.04 LBS | HEIGHT: 63 IN | BODY MASS INDEX: 35.98 KG/M2

## 2023-11-20 DIAGNOSIS — Z12.31 VISIT FOR SCREENING MAMMOGRAM: ICD-10-CM

## 2023-11-20 PROCEDURE — 77063 BREAST TOMOSYNTHESIS BI: CPT

## 2023-11-20 PROCEDURE — 77067 SCR MAMMO BI INCL CAD: CPT

## 2023-11-22 LAB
25(OH)D2 SERPL-MCNC: <1 NG/ML
25(OH)D3 SERPL-MCNC: 44 NG/ML
25(OH)D3+25(OH)D2 SERPL-MCNC: 44 NG/ML

## 2023-11-27 ENCOUNTER — TELEPHONE (OUTPATIENT)
Dept: HEMATOLOGY ONCOLOGY | Facility: CLINIC | Age: 68
End: 2023-11-27

## 2023-11-27 NOTE — TELEPHONE ENCOUNTER
Patient Call    Who are you speaking with? Patient    If it is not the patient, are they listed on an active communication consent form? N/A   What is the reason for this call? Patient is wondering if she can still keep her appoint on 11/30 due to not having an ultrasound on 12/4/23   Does this require a call back? Yes   If a call back is required, please list best call back number [phone]   If a call back is required, advise that a message will be forwarded to their care team and someone will return their call as soon as possible. Did you relay this information to the patient?  Yes

## 2023-11-28 NOTE — PROGRESS NOTES
Call placed to patient regarding recommendation for right breast lymph node ultrasound, explained procedure if indicated. ___x__ RIGHT _____LEFT      __X___Ultrasound guided  lymph node. Pt states that procedure was explained to her, additional questions answered at this time    __X___Verbalized understanding.       Blood thinners: No: __x___ Yes: _____ What:     Biopsy teaching sheet given:  _____yes __X__no (All teaching points discussed during call, pt with no questions at this time)    Pt given name/# for any further questions/needs    Pt agreeable to a post procedure call and states we can give her biopsy results to her over the phone

## 2023-11-29 ENCOUNTER — HOSPITAL ENCOUNTER (OUTPATIENT)
Dept: ULTRASOUND IMAGING | Facility: CLINIC | Age: 68
Discharge: HOME | End: 2023-11-29
Payer: MEDICARE

## 2023-11-29 ENCOUNTER — HOSPITAL ENCOUNTER (OUTPATIENT)
Dept: MAMMOGRAPHY | Facility: CLINIC | Age: 68
Discharge: HOME | End: 2023-11-29
Payer: MEDICARE

## 2023-11-29 ENCOUNTER — TELEPHONE (OUTPATIENT)
Dept: SURGICAL ONCOLOGY | Facility: CLINIC | Age: 68
End: 2023-11-29

## 2023-11-29 ENCOUNTER — APPOINTMENT (OUTPATIENT)
Dept: PHYSICAL THERAPY | Facility: CLINIC | Age: 68
End: 2023-11-29
Payer: MEDICARE

## 2023-11-29 VITALS — DIASTOLIC BLOOD PRESSURE: 96 MMHG | HEART RATE: 83 BPM | SYSTOLIC BLOOD PRESSURE: 165 MMHG

## 2023-11-29 DIAGNOSIS — R92.8 ABNORMAL MAMMOGRAM: ICD-10-CM

## 2023-11-29 PROCEDURE — 88185 FLOWCYTOMETRY/TC ADD-ON: CPT | Performed by: NURSE PRACTITIONER

## 2023-11-29 PROCEDURE — 88360 TUMOR IMMUNOHISTOCHEM/MANUAL: CPT | Performed by: STUDENT IN AN ORGANIZED HEALTH CARE EDUCATION/TRAINING PROGRAM

## 2023-11-29 PROCEDURE — 88184 FLOWCYTOMETRY/ TC 1 MARKER: CPT | Performed by: NURSE PRACTITIONER

## 2023-11-29 PROCEDURE — 88341 IMHCHEM/IMCYTCHM EA ADD ANTB: CPT | Performed by: STUDENT IN AN ORGANIZED HEALTH CARE EDUCATION/TRAINING PROGRAM

## 2023-11-29 PROCEDURE — 76942 ECHO GUIDE FOR BIOPSY: CPT

## 2023-11-29 PROCEDURE — 88342 IMHCHEM/IMCYTCHM 1ST ANTB: CPT | Performed by: STUDENT IN AN ORGANIZED HEALTH CARE EDUCATION/TRAINING PROGRAM

## 2023-11-29 PROCEDURE — 88305 TISSUE EXAM BY PATHOLOGIST: CPT | Performed by: STUDENT IN AN ORGANIZED HEALTH CARE EDUCATION/TRAINING PROGRAM

## 2023-11-29 PROCEDURE — 88365 INSITU HYBRIDIZATION (FISH): CPT | Performed by: STUDENT IN AN ORGANIZED HEALTH CARE EDUCATION/TRAINING PROGRAM

## 2023-11-29 PROCEDURE — 38505 NEEDLE BIOPSY LYMPH NODES: CPT

## 2023-11-29 PROCEDURE — 76642 ULTRASOUND BREAST LIMITED: CPT

## 2023-11-29 RX ORDER — LIDOCAINE HYDROCHLORIDE 10 MG/ML
5 INJECTION, SOLUTION EPIDURAL; INFILTRATION; INTRACAUDAL; PERINEURAL ONCE
Status: COMPLETED | OUTPATIENT
Start: 2023-11-29 | End: 2023-11-29

## 2023-11-29 RX ADMIN — LIDOCAINE HYDROCHLORIDE 5 ML: 10 INJECTION, SOLUTION EPIDURAL; INFILTRATION; INTRACAUDAL; PERINEURAL at 14:17

## 2023-11-29 NOTE — PROGRESS NOTES
Ice pack given:    __X___yes _____no    Discharge instructions signed by patient:    _____yes __X___no    Discharge instructions given to patient:    ___X__yes _____no    Discharged via:    __X___ambulatory    _____wheelchair    _____stretcher    Stable on discharge:    __X___yes ____no

## 2023-11-29 NOTE — PROGRESS NOTES
Patient arrived via:    __X___ambulatory    _____wheelchair    _____stretcher      Domestic violence screen    ___X___negative______positive    Breast Implants:    ____x___yes  (Left Breast only)_______no

## 2023-11-29 NOTE — TELEPHONE ENCOUNTER
Spoke to patient and she is getting biopsy done today and we rescheduled her appointment with QUETA Rivera to 12/4 at 11:30am

## 2023-11-29 NOTE — PROGRESS NOTES
Procedure type:    __X___ultrasound guided _____stereotactic    Breast:    _____Left __X___Right    Location: Rt axilla     Needle:16 gauge     # of passes: 4    Clip: Hydromark Butterfly     Performed by: Dr. Harpreet Casanova held for 5 minutes by: Magali Farrar Strips:    _____yes ___X__no    Band aid:    _____yes___X__no    Tape and guaze:    __X___yes _____no    Tolerated procedure:    ___X__yes _____no

## 2023-11-30 NOTE — PROGRESS NOTES
Post procedure call completed    Bleeding: _____yes __X___no    Pain: _____yes ___X___no    Redness/Swelling: ______yes ___X___no    Band aid removed: _____yes ___X__no (discussed removing when she showers)    Steri-Strips intact: ___X___yes _____no (discussed with patient to remove steri strips on 12/4/2023if they have not come off on their own)    Pt with no questions at this time, adv will call when results available, adv to call with any questions or concerns, has name/# for contact

## 2023-12-01 PROCEDURE — 88365 INSITU HYBRIDIZATION (FISH): CPT | Performed by: STUDENT IN AN ORGANIZED HEALTH CARE EDUCATION/TRAINING PROGRAM

## 2023-12-01 PROCEDURE — 88305 TISSUE EXAM BY PATHOLOGIST: CPT | Performed by: STUDENT IN AN ORGANIZED HEALTH CARE EDUCATION/TRAINING PROGRAM

## 2023-12-01 PROCEDURE — 88360 TUMOR IMMUNOHISTOCHEM/MANUAL: CPT | Performed by: STUDENT IN AN ORGANIZED HEALTH CARE EDUCATION/TRAINING PROGRAM

## 2023-12-01 PROCEDURE — 88342 IMHCHEM/IMCYTCHM 1ST ANTB: CPT | Performed by: STUDENT IN AN ORGANIZED HEALTH CARE EDUCATION/TRAINING PROGRAM

## 2023-12-01 PROCEDURE — 88341 IMHCHEM/IMCYTCHM EA ADD ANTB: CPT | Performed by: STUDENT IN AN ORGANIZED HEALTH CARE EDUCATION/TRAINING PROGRAM

## 2023-12-02 LAB — SCAN RESULT: NORMAL

## 2023-12-05 ENCOUNTER — OFFICE VISIT (OUTPATIENT)
Dept: PHYSICAL THERAPY | Facility: CLINIC | Age: 68
End: 2023-12-05
Payer: MEDICARE

## 2023-12-05 DIAGNOSIS — Z85.3 HISTORY OF LEFT BREAST CANCER: Primary | ICD-10-CM

## 2023-12-05 PROCEDURE — 97140 MANUAL THERAPY 1/> REGIONS: CPT | Performed by: PHYSICAL THERAPIST

## 2023-12-05 NOTE — PROGRESS NOTES
Daily Note     Today's date: 2023  Patient name: Sj Goodwin  : 1955  MRN: [id number]  Referring provider: HENRIK Ng  Dx:   Encounter Diagnosis     ICD-10-CM    1. History of left breast cancer  Z85.3                      Subjective: Pt reports consistent use of arm sleeve. Some concern/anxiety around recent medical testing including R sided biopsy. Concern for lymphedema risks in R UE now as well. Objective: See treatment diary below    UPPER EXTREMITY LEFT CALCULATIONS      Flowsheet Row Most Recent Value   Volume UE (mL) 2788   Difference from last visit (mL)  33   Difference from first visit (mL)  348   Difference from last visit (%)  1   Difference from first visit (%)  11                Assessment: Tolerated treatment well. Patient would benefit from continued PT  Measurements this day show overall reduction in L UE and decrease of 1% from previous session, I do not reduction in girth of L upper arm but a bit heavier actually in forearm region. Discussed consideration for nighttime garment, new daytime compression to be considered - will discuss NV, also plan to re-measure R UE NV for comparison to baseline from Aug '23. Plan: Continue per plan of care. Precautions: Hx CA, s/p L mastectomy      Manuals 8-21 9-5 9-21 10-19 11-9 12-5       MLD L UE [facility]       Self MLD instruction [facility], eduardo instr.            Compression TG 2.75" forearm TG 2.75" forearm TG 2.75" forearm TG 2,75" forearm Arm sleeve armsleeve       Measurements    [facility]       Neuro Re-Ed                                                                                                        Ther Ex                                                                                                                     Ther Activity                                       Gait Training                                       Modalities

## 2023-12-12 ENCOUNTER — ONCOLOGY SURVIVORSHIP (OUTPATIENT)
Dept: SURGICAL ONCOLOGY | Facility: CLINIC | Age: 68
End: 2023-12-12
Payer: MEDICARE

## 2023-12-12 VITALS
OXYGEN SATURATION: 94 % | SYSTOLIC BLOOD PRESSURE: 119 MMHG | HEIGHT: 63 IN | TEMPERATURE: 98.3 F | DIASTOLIC BLOOD PRESSURE: 69 MMHG | BODY MASS INDEX: 35.79 KG/M2 | WEIGHT: 202 LBS | HEART RATE: 71 BPM | RESPIRATION RATE: 14 BRPM

## 2023-12-12 DIAGNOSIS — Z08 ENCOUNTER FOR FOLLOW-UP EXAMINATION AFTER COMPLETED TREATMENT FOR MALIGNANT NEOPLASM: Primary | ICD-10-CM

## 2023-12-12 DIAGNOSIS — Z85.3 HISTORY OF LEFT BREAST CANCER: ICD-10-CM

## 2023-12-12 DIAGNOSIS — Z12.31 VISIT FOR SCREENING MAMMOGRAM: ICD-10-CM

## 2023-12-12 PROCEDURE — 99213 OFFICE O/P EST LOW 20 MIN: CPT | Performed by: NURSE PRACTITIONER

## 2023-12-12 RX ORDER — TACROLIMUS 1 MG/G
OINTMENT TOPICAL
COMMUNITY
Start: 2023-10-26

## 2023-12-12 NOTE — PROGRESS NOTES
Surgical Oncology Follow Up       [facility]  [facility] [place] [address]    Woodway Crews  1955  [id number]      Chief Complaint   Patient presents with    Follow-up       Assessment/Plan:  1. Encounter for follow-up examination after completed treatment for malignant neoplasm    2. History of left breast cancer  - 1 year f/u visit    3. Visit for screening mammogram  - Mammo screening right w 3d & cad; Future      Discussion/Summary: Patient is a 31-year-old female that was diagnosed with a left breast cancer in 2013. Her pathology revealed invasive ductal carcinoma, triple negative. She underwent a lumpectomy and sentinel node biopsy which revealed close margins. She underwent a completion mastectomy and had multiple reconstructive surgeries. She completed adjuvant chemotherapy and is currently on observation. She had undergone genetic testing, most recently in 2015 with the [employer] panel which was negative. She had a right 3D screening mammogram last month which revealed an enlarged right axillary lymph node. She underwent a diagnostic ultrasound which revealed a 28 x 21 x 8 mm lymph node with a cortical thickness of 5 mm. She underwent an ultrasound-guided biopsy, with flow cytometry which revealed reactive lymph node, dermatopathic lymphadenitis. This was concordant. We discussed this in more detail again today. No obvious skin reaction that would account for the lymphadenopathy. As patient is followed with medical oncology for chronic thrombocytosis and leukocytosis, I encouraged her to follow-up with medical oncology to see if any further testing is needed given this new finding. Otherwise there are no worrisome findings on today's clinical exam.  She continues with lymphedema therapy for her left arm. Prescription given for next year's mammogram.  I will see her back in 1 year or sooner should the need arise. She was instructed to contact us with any changes or concerns in the interim. All of her questions were answered today. History of Present Illness:     -Interval History: Patient presents today for a follow-up visit. She had a screening mammogram which revealed a enlarged right axillary lymph node. She underwent an ultrasound-guided biopsy and pathology and flow cytometry were benign. This revealed a reactive lymph node with changes compatible with dermatopathic lymphadenitis. This was concordant. Patient states that she had contact dermatitis by her eyes but denies any recent skin issues elsewhere. She follows routinely with a dermatologist and recently had a biopsy of her left upper abdomen. Otherwise she denies any other new or persistent symptoms out of the norm. Review of Systems:  Review of Systems   Constitutional:  Negative for activity change, appetite change, chills, fatigue, fever and unexpected weight change. Respiratory:  Negative for cough and shortness of breath. Cardiovascular:  Negative for chest pain. Gastrointestinal:  Negative for abdominal pain, constipation, diarrhea, nausea and vomiting. Musculoskeletal:  Negative for arthralgias, back pain, gait problem and myalgias. Skin:  Negative for color change and rash. Neurological:  Negative for dizziness and headaches. Hematological:  Negative for adenopathy. Psychiatric/Behavioral:  Negative for agitation and confusion. All other systems reviewed and are negative.       Patient Active Problem List   Diagnosis    History of left breast cancer    Leukocytosis    Essential (hemorrhagic) thrombocythemia ([address])    Hypothyroidism    Encounter for follow-up examination after completed treatment for malignant neoplasm    Obesity, morbid ([address])     Past Medical History:   Diagnosis Date    Alopecia totalis     Arthritis of knee     bilateral    Basal cell carcinoma (BCC) of chest     Breast CA ([address]) 2014    Left    Cancer ([address]) 07/2021    Basal cell carcinoma on chest    Colon polyp     Disease of thyroid gland     Herpes zoster     History of chemotherapy 2014    Hyperlipidemia     Hypertension     Hypothyroidism     Lymphadenopathy, mediastinal     Menopause     Multiple allergies     Murmur, cardiac     PHN (postherpetic neuralgia)     PONV (postoperative nausea and vomiting)     Postherpetic neuralgia     Thrombocytopathy ([address])      Past Surgical History:   Procedure Laterality Date    BASAL CELL CARCINOMA EXCISION  08/2021    BREAST LUMPECTOMY Left 2014    BREAST RECONSTRUCTION Left     COLONOSCOPY      also needed CT of colon    COLONOSCOPY      MASTECTOMY Left 2014    OVARIAN CYST REMOVAL  2002    REPLACEMENT TOTAL KNEE Bilateral     R 01/21/20, L 02/25/20    SENTINEL LYMPH NODE BIOPSY Left 2014    TUBAL LIGATION Bilateral     UPPER GASTROINTESTINAL ENDOSCOPY      US GUIDED BREAST LYMPH NODE BIOPSY RIGHT Right 11/29/2023    WISDOM TOOTH EXTRACTION       Family History   Problem Relation Age of Onset    Diabetes Father     Hypertension Father     Bone cancer Father 61    Multiple myeloma Father 61    Diabetes Sister     Polycythemia Sister     Heart attack Mother     Stroke Mother         CVA?     Breast cancer Maternal Aunt         age dx unknown    Other Maternal Uncle         Brain tumor    No Known Problems Daughter     No Known Problems Maternal Grandmother     No Known Problems Maternal Grandfather     No Known Problems Paternal Grandmother     No Known Problems Paternal Grandfather     No Known Problems Sister     No Known Problems Daughter     No Known Problems Maternal Aunt     No Known Problems Maternal Aunt     No Known Problems Maternal Aunt     No Known Problems Maternal Aunt     No Known Problems Maternal Aunt     No Known Problems Maternal Aunt      Social History     Socioeconomic History    Marital status: /Civil Union     Spouse name: Not on file    Number of children: Not on file    Years of education: Not on file    Highest education level: Not on file   Occupational History     Employer: [employer]   Tobacco Use    Smoking status: Former     Packs/day: 0.50     Years: 27.00     Total pack years: 13.50     Types: Cigarettes     Quit date:      Years since quittin.9    Smokeless tobacco: Never   Vaping Use    Vaping Use: Never used   Substance and Sexual Activity    Alcohol use: Yes     Alcohol/week: 10.0 standard drinks of alcohol     Types: 10 Standard drinks or equivalent per week    Drug use: Never    Sexual activity: Yes     Birth control/protection: Post-menopausal   Other Topics Concern    Not on file   Social History Narrative    Not on file     Social Determinants of Health     Financial Resource Strain: Low Risk  (2023)    Overall Financial Resource Strain (CARDIA)     Difficulty of Paying Living Expenses: Not hard at all   Food Insecurity: No Food Insecurity (2020)    Hunger Vital Sign     Worried About Running Out of Food in the Last Year: Never true     Ran Out of Food in the Last Year: Never true   Transportation Needs: No Transportation Needs (2023)    PRAPARE - Transportation     Lack of Transportation (Medical): No     Lack of Transportation (Non-Medical): No   Physical Activity: Insufficiently Active (2019)    Exercise Vital Sign     Days of Exercise per Week: 4 days     Minutes of Exercise per Session: 30 min   Stress: No Stress Concern Present (2019)    [facility]     Feeling of Stress : Only a little   Social Connections:  Moderately Integrated (2019)    Social Connection and Isolation Panel [NHANES]     Frequency of Communication with Friends and Family: More than three times a week     Frequency of Social Gatherings with Friends and Family: More than three times a week     Attends Confucianist Services: 1 to 4 times per year     Active Member of [address] or Organizations: No     Attends [employer] or Organization Meetings: Never     Marital Status:    Intimate Partner Violence: Not At Risk (11/6/2019)    Humiliation, Afraid, Rape, and Kick questionnaire     Fear of Current or Ex-Partner: No     Emotionally Abused: No     Physically Abused: No     Sexually Abused: No   Housing Stability: Not on file       Current Outpatient Medications:     ascorbic acid (VITAMIN C) 500 MG tablet, Take by mouth, Disp: , Rfl:     aspirin (ECOTRIN LOW STRENGTH) 81 mg EC tablet, Take 81 mg by mouth daily, Disp: , Rfl:     Calcium Carb-Cholecalciferol 1000-800 MG-UNIT TABS, Take by mouth, Disp: , Rfl:     Cetirizine HCl 10 MG CAPS, Take by mouth, Disp: , Rfl:     EPINEPHrine (EPIPEN) 0.3 mg/0.3 mL SOAJ, INJECT 0.3 ML (0.3 MG TOTAL) INJECT INTO THE MUSCLE ONE TIME FOR 1 DOSE., Disp: , Rfl:     levothyroxine 137 mcg tablet, Take 1 tablet (137 mcg total) by mouth daily, Disp: 60 tablet, Rfl: 1    lisinopril (ZESTRIL) 20 mg tablet, TAKE 1 TABLET BY MOUTH EVERY DAY, Disp: 90 tablet, Rfl: 1    montelukast (SINGULAIR) 10 mg tablet, TAKE 1 TABLET BY MOUTH EVERYDAY AT BEDTIME, Disp: 90 tablet, Rfl: 2    Multiple Vitamin (MULTI-DAY VITAMINS) TABS, Take by mouth, Disp: , Rfl:     Omega-3 Fatty Acids (Fish Oil) 1200 MG CAPS, , Disp: , Rfl:     Psyllium (METAMUCIL FIBER PO), Take by mouth, Disp: , Rfl:     Restasis 0.05 % ophthalmic emulsion, , Disp: , Rfl:     rosuvastatin (CRESTOR) 20 MG tablet, TAKE 1 TABLET BY MOUTH EVERY DAY, Disp: 90 tablet, Rfl: 1    tacrolimus (PROTOPIC) 0.1 % ointment, APPLY TO AFFECTED AREAS ONCE TO TWICE DAILY AS NEEDED ON AFFECTED AREAS AROUND THE EYES, Disp: , Rfl:     VITAMIN E COMPLEX PO, Take by mouth, Disp: , Rfl:   Allergies   Allergen Reactions    Clarithromycin     Clindamycin Abdominal Pain    Fluticasone Swelling    Other     Sulfanilamide     Adhesive [Medical Tape] Rash    Cobalt Rash    Formaldehyde Rash    Gold Sodium Thiomalate Rash    Nickel Rash    Penicillins Rash    Propolis Rash    Sulfa Antibiotics Rash     Vitals:    12/12/23 1124   BP: 119/69   Pulse: 71   Resp: 14   Temp: 98.3 °F (36.8 °C)   SpO2: 94%       Physical Exam  Vitals reviewed. Constitutional:       General: She is not in acute distress. Appearance: Normal appearance. She is well-developed. She is not diaphoretic. HENT:      Head: Normocephalic and atraumatic. Cardiovascular:      Rate and Rhythm: Normal rate and regular rhythm. Heart sounds: Normal heart sounds. Pulmonary:      Effort: Pulmonary effort is normal.      Breath sounds: Normal breath sounds. Chest:   Breasts:     Right: Skin change (reduction/lift scars present) present. No swelling, bleeding, inverted nipple, mass, nipple discharge or tenderness. Comments: Left reconstructed breast ( Latissimus dorsi flap)  Without masses, skin changes or nodules. Left axilla is tight    No skin lesions or skin conditions noted right arm, back/shoulder/chest. Palpable right axillary lymph node  Abdominal:      Palpations: Abdomen is soft. There is no mass. Tenderness: There is no abdominal tenderness. Musculoskeletal:         General: Normal range of motion. Cervical back: Normal range of motion. Lymphadenopathy:      Upper Body:      Right upper body: No supraclavicular adenopathy. Left upper body: No supraclavicular or axillary adenopathy. Skin:     General: Skin is warm and dry. Findings: No rash. Neurological:      Mental Status: She is alert and oriented to person, place, and time. Psychiatric:         Speech: Speech normal.           Results:    Imaging  US guided breast lymph node biopsy right, Mammo post biopsy right    Addendum Date: 12/4/2023 Addendum:   ADDENDUM: PATHOLOGY RESULTS: 1) Lymph Node [A] (Right; Axilla; Posterior) The pathology results indicate a Benign finding with reactive lymph node tissue and dermatopathic lymphadenitis. No evidence for primary or secondary malignancy. No lymphoproliferative disorder. Radiology and pathology are  concordant. Clinical surveillance in addition to return to routine screening mammography recommended. RECOMMENDATION:      - Routine Screening Mammogram in 1 year for the right breast.      - Clinical Management for the right breast. END ADDENDUM    Result Date: 12/4/2023  Narrative: DIAGNOSIS: Abnormal mammogram INDICATION: Elly Jackson is a 80 y.o. female presenting for enlarging right axillary lymph node with focal cortical thickening. Prior to the procedure, previous imaging was reviewed. The procedure was explained to the patient in detail. All questions were answered. Written and verbal informed consent was obtained. FINDINGS: RIGHT 1) LYMPH NODE [A] US guided breast lymph node biopsy right: Images of the right breast lymph node in the axilla region in the posterior portion were obtained. The patient was placed supine on the biopsy table. A core needle biopsy was performed under ultrasound guidance using a lateral approach. After obtaining informed consent for the procedure at which time the risks and benefits were explained to the patient including bleeding, infection, pneumothorax, timeout was performed. The skin was prepped in the usual fashion. Anesthesia was administered using 5 mL of lidocaine 1%. A 16 G device was advanced in multiple passes. A HydroMARK butterfly clip marker was placed in the target area. Post-placement ultrasound and digital mammographic imaging demonstrate the clip was at the site. The patient tolerated the procedure well. There were no immediate complications. Impression:  Ultrasound core biopsy of pathologically enlarged right axillary lymph node. Specimens were sent for both histology and flow cytometry.  RECOMMENDATION:      - Waiting for pathology for the right breast. Workstation ID: [id number] [email or web address] [id number] right limited (diagnostic)    Result Date: 11/29/2023  Narrative: DIAGNOSIS: Abnormal mammogram TECHNIQUE: Ultrasound of the right breast(s) was performed. COMPARISONS: Prior breast imaging dated: 11/20/2023, 11/17/2022, 11/16/2021, 08/31/2020, 08/30/2019, 08/23/2018, 08/24/2017, 08/24/2017, 08/22/2016, 08/22/2016, 08/18/2015, 08/18/2015, 08/18/2015, 03/16/2015, 08/08/2014, and 01/16/2014 RELEVANT HISTORY: Family Breast Cancer History: History of breast cancer in Maternal Aunt. Family Medical History: Family medical history includes breast cancer in maternal aunt. Personal History: Hormone history includes birth control. Surgical history includes lumpectomy, mastectomy, and breast explant. Medical history includes breast cancer and history of chemotherapy. RISK ASSESSMENT: [facility] risk assessment reporting was suppressed due to the patient's history and/or demographic factors. INDICATION: Phyllis Saini is a 80 y.o. female presenting for abnormal mammogram. FINDINGS: RIGHT 1) LYMPH NODE [A]: There is a 28 mm x 21 mm x 8 mm lymph node with circumscribed margins seen in the right axilla. The lymph node correlates with the prior mammogram finding. Focal cortical thickening measures 5 mm. Ultrasound-guided core biopsy recommended including with flow cytometry. The findings were discussed with the patient prior to sampling. Impression:  Ultrasound-guided core biopsy recommended for prominent right axillary lymph node with focal cortical thickening. ASSESSMENT/BI-RADS CATEGORY: Right: 4 - Suspicious Overall: 4 - Suspicious RECOMMENDATION:      - Ultrasound-guided breast biopsy for the right breast. Workstation ID: [id number]    Mammo screening right w 3d & cad    Result Date: 11/21/2023  Narrative: DIAGNOSIS: Visit for screening mammogram TECHNIQUE: Digital screening mammography was performed. Computer Aided Detection (CAD) analyzed all applicable images.  COMPARISONS: Prior breast imaging dated: 11/17/2022, 11/16/2021, 08/31/2020, 08/30/2019, 08/23/2018, 08/24/2017, 08/24/2017, 08/22/2016, 08/18/2015, 08/18/2015, and 08/08/2014 RELEVANT HISTORY: Family Breast Cancer History: History of breast cancer in Maternal Aunt. Family Medical History: Family medical history includes breast cancer in maternal aunt. Personal History: Hormone history includes birth control. Surgical history includes lumpectomy, mastectomy, and breast explant. Medical history includes breast cancer and history of chemotherapy. The patient is scheduled in a reminder system for screening mammography. 8-10% of cancers will be missed on mammography. Management of a palpable abnormality must be based on clinical grounds. Patients will be notified of their results via letter from our facility. Accredited by [employer] [facility] [employer] and [facility]. RISK ASSESSMENT: [facility] risk assessment reporting was suppressed due to the patient's history and/or demographic factors. TISSUE DENSITY: There are scattered areas of fibroglandular density. INDICATION: Elly Jackson is a 78 y.o. female presenting for screening mammography. FINDINGS: RIGHT 1) LYMPH NODE [A]: There is a 29 mm x 19 mm lymph node seen in the right axilla. Compared to the previous study, the axillary lymph node increased in size. Impression: Gradually enlarging hypertrophic right axillary lymph node. Unless there is clinical explanation for this finding, recommend further characterization with ultrasound and consider tissue sampling. A breast health care nurse from our facility will be contacting the patient regarding the need for additional imaging. ASSESSMENT/BI-RADS CATEGORY: Right: 0 - Incomplete: Needs Additional Imaging Evaluation Overall: 0 - Incomplete: Needs Additional Imaging Evaluation RECOMMENDATION:      - Ultrasound at the current time for the right breast. Workstation ID: [id number]       I reviewed the above imaging data. Advance Care Planning/Advance Directives:  Discussed disease status and treatment goals with the patient.

## 2023-12-20 ENCOUNTER — ANNUAL EXAM (OUTPATIENT)
Dept: GYNECOLOGY | Facility: CLINIC | Age: 68
End: 2023-12-20
Payer: MEDICARE

## 2023-12-20 VITALS
DIASTOLIC BLOOD PRESSURE: 63 MMHG | BODY MASS INDEX: 35.26 KG/M2 | SYSTOLIC BLOOD PRESSURE: 116 MMHG | HEART RATE: 78 BPM | WEIGHT: 199 LBS | HEIGHT: 63 IN

## 2023-12-20 DIAGNOSIS — Z85.3 HISTORY OF LEFT BREAST CANCER: ICD-10-CM

## 2023-12-20 DIAGNOSIS — Z01.419 ENCOUNTER FOR GYNECOLOGICAL EXAMINATION WITHOUT ABNORMAL FINDING: ICD-10-CM

## 2023-12-20 DIAGNOSIS — Z78.0 MENOPAUSE: ICD-10-CM

## 2023-12-20 DIAGNOSIS — Z13.820 SCREENING FOR OSTEOPOROSIS: Primary | ICD-10-CM

## 2023-12-20 DIAGNOSIS — Z01.419 ENCOUNTER FOR GYNECOLOGICAL EXAMINATION WITH PAPANICOLAOU SMEAR OF CERVIX: ICD-10-CM

## 2023-12-20 PROCEDURE — G0143 SCR C/V CYTO,THINLAYER,RESCR: HCPCS | Performed by: OBSTETRICS & GYNECOLOGY

## 2023-12-20 PROCEDURE — G0101 CA SCREEN;PELVIC/BREAST EXAM: HCPCS | Performed by: OBSTETRICS & GYNECOLOGY

## 2023-12-20 NOTE — PROGRESS NOTES
Assessment/Plan:         Diagnoses and all orders for this visit:    Screening for osteoporosis  -     DXA bone density spine hip and pelvis; Future    History of left breast cancer    Menopause  -     DXA bone density spine hip and pelvis; Future    Encounter for gynecological examination without abnormal finding      Subjective:      Patient ID: Nadia Palacio is a 68 y.o. female.    HPI patient presents for annual examination.  She has a history of left breast cancer diagnosed in 2014. stage Ia,  triple negative.  Status post left mastectomy with reconstruction.  She denies any vaginal irritation, burning, discharge or bleeding.  Denies any dysuria, hematuria, urgency or stress urinary incontinence.  No GI complaints.    Colonoscopy December 2021.  Polyps identified.  Advised repeat in 3 years    DEXA scan February 2022.  Osteopenia    The following portions of the patient's history were reviewed and updated as appropriate: She  has a past medical history of Alopecia totalis, Arthritis of knee, Basal cell carcinoma (BCC) of chest, Breast CA (HCC) (2014), Cancer ([facility]) (07/2021), Colon polyp, Disease of thyroid gland, Herpes zoster, History of chemotherapy (2014), Hyperlipidemia, Hypertension, Hypothyroidism, Lymphadenopathy, mediastinal, Menopause, Multiple allergies, Murmur, cardiac, PHN (postherpetic neuralgia), PONV (postoperative nausea and vomiting), Postherpetic neuralgia, and Thrombocytopathy ([facility]).  She   Patient Active Problem List    Diagnosis Date Noted    Obesity, morbid ([facility]) 01/18/2022    Encounter for follow-up examination after completed treatment for malignant neoplasm 11/15/2021    Hypothyroidism 02/16/2019    Essential (hemorrhagic) thrombocythemia ([facility]) 10/30/2018    History of left breast cancer 04/03/2018    Leukocytosis 04/03/2018     She  has a past surgical history that includes Tubal ligation (Bilateral); [place] tooth extraction; Ovarian cyst removal (2002); Colonoscopy; Replacement total  knee (Bilateral); Colonoscopy; Upper gastrointestinal endoscopy; [place] lymph node biopsy (Left, 2014); Excision basal cell carcinoma (08/2021); Breast reconstruction (Left); Breast lumpectomy (Left, 2014); Mastectomy (Left, 2014); and US guided breast lymph node biopsy right (Right, 11/29/2023).  Her family history includes Bone cancer (age of onset: 60) in her father; Breast cancer in her maternal aunt; Diabetes in her father and sister; Heart attack in her mother; Hypertension in her father; Multiple myeloma (age of onset: 60) in her father; No Known Problems in her daughter, daughter, maternal aunt, maternal aunt, maternal aunt, maternal aunt, maternal aunt, maternal aunt, maternal grandfather, maternal grandmother, paternal grandfather, paternal grandmother, and sister; Other in her maternal uncle; Polycythemia in her sister; Stroke in her mother.  She  reports that she quit smoking about 24 years ago. Her smoking use included cigarettes. She started smoking about 52 years ago. She has a 13.5 pack-year smoking history. She has never used smokeless tobacco. She reports current alcohol use of about 10.0 standard drinks of alcohol per week. She reports that she does not use drugs.  Current Outpatient Medications   Medication Sig Dispense Refill    ascorbic acid (VITAMIN C) 500 MG tablet Take by mouth      aspirin (ECOTRIN LOW STRENGTH) 81 mg EC tablet Take 81 mg by mouth daily      Calcium Carb-Cholecalciferol 1000-800 MG-UNIT TABS Take by mouth      Cetirizine HCl 10 MG CAPS Take by mouth      EPINEPHrine (EPIPEN) 0.3 mg/0.3 mL SOAJ INJECT 0.3 ML (0.3 MG TOTAL) INJECT INTO THE MUSCLE ONE TIME FOR 1 DOSE.      levothyroxine 137 mcg tablet Take 1 tablet (137 mcg total) by mouth daily 60 tablet 1    lisinopril (ZESTRIL) 20 mg tablet TAKE 1 TABLET BY MOUTH EVERY DAY 90 tablet 1    montelukast (SINGULAIR) 10 mg tablet TAKE 1 TABLET BY MOUTH EVERYDAY AT BEDTIME 90 tablet 2    Multiple Vitamin (MULTI-DAY VITAMINS)  TABS Take by mouth      Omega-3 Fatty Acids (Fish Oil) 1200 MG CAPS       Psyllium (METAMUCIL FIBER PO) Take by mouth      Restasis 0.05 % ophthalmic emulsion       rosuvastatin (CRESTOR) 20 MG tablet TAKE 1 TABLET BY MOUTH EVERY DAY 90 tablet 1    tacrolimus (PROTOPIC) 0.1 % ointment APPLY TO AFFECTED AREAS ONCE TO TWICE DAILY AS NEEDED ON AFFECTED AREAS AROUND THE EYES      VITAMIN E COMPLEX PO Take by mouth       No current facility-administered medications for this visit.     Current Outpatient Medications on File Prior to Visit   Medication Sig    ascorbic acid (VITAMIN C) 500 MG tablet Take by mouth    aspirin (ECOTRIN LOW STRENGTH) 81 mg EC tablet Take 81 mg by mouth daily    Calcium Carb-Cholecalciferol 1000-800 MG-UNIT TABS Take by mouth    Cetirizine HCl 10 MG CAPS Take by mouth    EPINEPHrine (EPIPEN) 0.3 mg/0.3 mL SOAJ INJECT 0.3 ML (0.3 MG TOTAL) INJECT INTO THE MUSCLE ONE TIME FOR 1 DOSE.    levothyroxine 137 mcg tablet Take 1 tablet (137 mcg total) by mouth daily    lisinopril (ZESTRIL) 20 mg tablet TAKE 1 TABLET BY MOUTH EVERY DAY    montelukast (SINGULAIR) 10 mg tablet TAKE 1 TABLET BY MOUTH EVERYDAY AT BEDTIME    Multiple Vitamin (MULTI-DAY VITAMINS) TABS Take by mouth    Omega-3 Fatty Acids (Fish Oil) 1200 MG CAPS     Psyllium (METAMUCIL FIBER PO) Take by mouth    Restasis 0.05 % ophthalmic emulsion     rosuvastatin (CRESTOR) 20 MG tablet TAKE 1 TABLET BY MOUTH EVERY DAY    tacrolimus (PROTOPIC) 0.1 % ointment APPLY TO AFFECTED AREAS ONCE TO TWICE DAILY AS NEEDED ON AFFECTED AREAS AROUND THE EYES    VITAMIN E COMPLEX PO Take by mouth     No current facility-administered medications on file prior to visit.     She is allergic to clarithromycin, clindamycin, fluticasone, other, sulfanilamide, adhesive [medical tape], cobalt, formaldehyde, gold sodium thiomalate, nickel, penicillins, propolis, and sulfa antibiotics..    Review of Systems   Constitutional: Negative.    HENT:  Negative for sore throat  "and trouble swallowing.    Gastrointestinal: Negative.    Genitourinary: Negative.          Objective:      /63   Pulse 78   Ht 5' 3\" (1.6 m)   Wt 90.3 kg (199 lb)   BMI 35.25 kg/m²          Physical Exam  Vitals reviewed.   Cardiovascular:      Rate and Rhythm: Normal rate and regular rhythm.      Pulses: Normal pulses.      Heart sounds: Normal heart sounds. No murmur heard.  Pulmonary:      Effort: Pulmonary effort is normal. No respiratory distress.      Breath sounds: Normal breath sounds.   Chest:   Breasts:     Right: No swelling, bleeding, inverted nipple, mass, nipple discharge, skin change or tenderness.      Left: Absent. No skin change.      Comments: Status post left mastectomy with reconstruction  Abdominal:      General: There is no distension.      Palpations: Abdomen is soft. There is no mass.      Tenderness: There is no abdominal tenderness. There is no guarding or rebound.      Hernia: No hernia is present. There is no hernia in the left inguinal area or right inguinal area.   Genitourinary:     General: Normal vulva.      Labia:         Right: No rash, tenderness or lesion.         Left: No rash, tenderness or lesion.       Comments: Uterus anteverted normal size and contour freely mobile and nontender.  No palpable adnexal masses or tenderness.  Vagina with atrophic changes.  [place]'s and [place]'s glands normal.  Urethral orifice normal.  No cystocele or rectocele.  Musculoskeletal:      Cervical back: Normal range of motion and neck supple.   Lymphadenopathy:      Upper Body:      Right upper body: No supraclavicular, axillary or pectoral adenopathy.      Left upper body: No supraclavicular, axillary or pectoral adenopathy.      Lower Body: No right inguinal adenopathy. No left inguinal adenopathy.   Neurological:      Mental Status: She is alert.           "

## 2023-12-27 ENCOUNTER — OFFICE VISIT (OUTPATIENT)
Dept: PHYSICAL THERAPY | Facility: CLINIC | Age: 68
End: 2023-12-27
Payer: MEDICARE

## 2023-12-27 DIAGNOSIS — Z85.3 HISTORY OF LEFT BREAST CANCER: Primary | ICD-10-CM

## 2023-12-27 LAB
LAB AP GYN PRIMARY INTERPRETATION: NORMAL
Lab: NORMAL

## 2023-12-27 PROCEDURE — 97140 MANUAL THERAPY 1/> REGIONS: CPT | Performed by: PHYSICAL THERAPIST

## 2023-12-27 NOTE — PROGRESS NOTES
"Daily Note     Today's date: 2023  Patient name: Nadia Palacio  : 1955  MRN: [id number]  Referring provider: Will Rivera CRNP  Dx:   Encounter Diagnosis     ICD-10-CM    1. History of left breast cancer  Z85.3                      Subjective: Pt reports consistent use of arm sleeve.  Some concern/anxiety around recent medical testing including R sided biopsy.  Concern for lymphedema risks in R UE now as well.       Objective: See treatment diary below        Assessment: Tolerated treatment well. Patient would benefit from continued PT  Mild increase by gross assessment.   Discussed consideration for nighttime garment, new daytime compression to be considered - will review NV, also plan to re-measure R UE NV and compare w/ previous assessment, cont to monitor for lymphedema risks.  Review garment options and plan to measure for garments 1-10.        Plan: Continue per plan of care.         Precautions: Hx CA, s/p L mastectomy      Manuals 8-21 9-5 9-21 10-19 11-9 12- 1227      MLD L UE [facility]      Self MLD instruction [facility], fam instr.           Compression TG 2.75\" forearm TG 2.75\" forearm TG 2.75\" forearm TG 2,75\" forearm Arm sleeve armsleeve armsleeve      Measurements    [facility]       Neuro Re-Ed                                                                                                        Ther Ex                                                                                                                     Ther Activity                                       Gait Training                                       Modalities                                            "

## 2023-12-29 ENCOUNTER — APPOINTMENT (OUTPATIENT)
Dept: PHYSICAL THERAPY | Facility: CLINIC | Age: 68
End: 2023-12-29
Payer: MEDICARE

## 2024-01-03 ENCOUNTER — APPOINTMENT (OUTPATIENT)
Dept: PHYSICAL THERAPY | Facility: CLINIC | Age: 69
End: 2024-01-03
Payer: MEDICARE

## 2024-01-09 DIAGNOSIS — E03.9 ACQUIRED HYPOTHYROIDISM: ICD-10-CM

## 2024-01-09 RX ORDER — LEVOTHYROXINE SODIUM 137 UG/1
137 TABLET ORAL DAILY
Qty: 90 TABLET | Refills: 1 | Status: SHIPPED | OUTPATIENT
Start: 2024-01-09

## 2024-01-10 ENCOUNTER — OFFICE VISIT (OUTPATIENT)
Dept: PHYSICAL THERAPY | Facility: CLINIC | Age: 69
End: 2024-01-10
Payer: MEDICARE

## 2024-01-10 DIAGNOSIS — Z85.3 HISTORY OF LEFT BREAST CANCER: Primary | ICD-10-CM

## 2024-01-10 PROCEDURE — 97140 MANUAL THERAPY 1/> REGIONS: CPT | Performed by: PHYSICAL THERAPIST

## 2024-01-10 NOTE — PROGRESS NOTES
"Daily Note     Today's date: 1/10/2024  Patient name: Nadia Palacio  : 1955  MRN: [id number]  Referring provider: Will Rivera CRNP  Dx:   Encounter Diagnosis     ICD-10-CM    1. History of left breast cancer  Z85.3                      Subjective: Pt reports consistent use of arm sleeve.  Still feels her L arm is roughly the same, discussed compression garment options pt amenable to nighttime and daytime.     Objective: See treatment diary below        Assessment: Tolerated treatment well. Patient would benefit from continued PT  Mild increase by gross assessment.   Discussed consideration for nighttime garment, new daytime compression to be considered - Measure NV, also plan to re-measure R UE NV and compare w/ previous assessment, cont to monitor for lymphedema risks.         Plan: Continue per plan of care.         Precautions: Hx CA, s/p L mastectomy      Manuals 8-21 9-5 9-21 10-19 11-9 12-5 12-27 1-10     MLD L UE [facility]     Self MLD instruction [facility], fam instr.           Compression TG 2.75\" forearm TG 2.75\" forearm TG 2.75\" forearm TG 2,75\" forearm Arm sleeve armsleeve armsleeve armsleeve     Measurements    [facility]       Neuro Re-Ed                                                                                                        Ther Ex                                                                                                                     Ther Activity                                       Gait Training                                       Modalities                                            "
Speaking Coherently

## 2024-01-16 ENCOUNTER — APPOINTMENT (OUTPATIENT)
Dept: PHYSICAL THERAPY | Facility: CLINIC | Age: 69
End: 2024-01-16
Payer: MEDICARE

## 2024-01-17 ENCOUNTER — EVALUATION (OUTPATIENT)
Dept: PHYSICAL THERAPY | Facility: CLINIC | Age: 69
End: 2024-01-17
Payer: MEDICARE

## 2024-01-17 ENCOUNTER — TELEPHONE (OUTPATIENT)
Age: 69
End: 2024-01-17

## 2024-01-17 DIAGNOSIS — I89.0 LYMPHEDEMA OF LEFT ARM: ICD-10-CM

## 2024-01-17 DIAGNOSIS — I89.0 LYMPHEDEMA: Primary | ICD-10-CM

## 2024-01-17 DIAGNOSIS — I89.0 LYMPHEDEMA: ICD-10-CM

## 2024-01-17 DIAGNOSIS — Z85.3 HISTORY OF LEFT BREAST CANCER: Primary | ICD-10-CM

## 2024-01-17 PROCEDURE — 97140 MANUAL THERAPY 1/> REGIONS: CPT | Performed by: PHYSICAL THERAPIST

## 2024-01-17 NOTE — TELEPHONE ENCOUNTER
Pt called in stating  that her Therapist Liban Elizondo is requesting a referral to be placed for a compression sleeve for day and night left arm. Please advise.

## 2024-01-17 NOTE — PROGRESS NOTES
"Daily Note     Today's date: 2024  Patient name: Nadia Palacio  : 1955  MRN: [id number]  Referring provider: Will Rivera CRNP  Dx:   Encounter Diagnosis     ICD-10-CM    1. History of left breast cancer  Z85.3                      Subjective: Pt reports consistent use of arm sleeve.  Still feels her L arm is roughly the same, discussed compression garment options pt amenable to nighttime and daytime.     Objective: See treatment diary below    UPPER EXTREMITY LEFT CALCULATIONS      Flowsheet Row Most Recent Value   Volume UE (mL) 2809   Difference from last visit (mL)  -21   Difference from first visit (mL)  327   Difference from last visit (%)  -1   Difference from first visit (%)  10                Assessment: Tolerated treatment well. Patient would benefit from continued PT  Measured this day, plateau seen, fitted for custom armsleeve this 20 C-G 20-30 mmHg Jobst elvarex, beige color CCL2. Silicone band at top.  Pt requires custom fitting due to irregularity in shape of arm, Hx of L breast CA related lymphedema.  Also fitted for CCL1 Jobst relax armsleeve nightime garment necessary to manage chronic L UE lymphedema, again requiring custom fot garment due to irregularity of shape and volume in arm.       Plan: Continue per plan of care.   Will f/u once garments obtained for fit test.         Precautions: Hx CA, s/p L mastectomy      Manuals 8-21 9-5 9-21 10-19 11-9 12-5 12-27 1-10 1-17    MLD L UE [facility]    Self MLD instruction [facility], fam instr.           Compression TG 2.75\" forearm TG 2.75\" forearm TG 2.75\" forearm TG 2,75\" forearm Arm sleeve armsleeve armsleeve armsleeve armsleeve    Measurements    [facility]    Neuro Re-Ed                                                                                                        Ther Ex                                                                                                                     Ther " Activity                                       Gait Training                                       Modalities

## 2024-01-24 ENCOUNTER — APPOINTMENT (OUTPATIENT)
Dept: LAB | Facility: MEDICAL CENTER | Age: 69
End: 2024-01-24
Payer: MEDICARE

## 2024-01-24 DIAGNOSIS — D75.839 THROMBOCYTOSIS: ICD-10-CM

## 2024-01-24 DIAGNOSIS — D72.829 LEUKOCYTOSIS, UNSPECIFIED TYPE: ICD-10-CM

## 2024-01-24 LAB
BASOPHILS # BLD AUTO: 0.08 THOUSANDS/ÂΜL (ref 0–0.1)
BASOPHILS NFR BLD AUTO: 1 % (ref 0–1)
EOSINOPHIL # BLD AUTO: 0.27 THOUSAND/ÂΜL (ref 0–0.61)
EOSINOPHIL NFR BLD AUTO: 2 % (ref 0–6)
ERYTHROCYTE [DISTWIDTH] IN BLOOD BY AUTOMATED COUNT: 14.7 % (ref 11.6–15.1)
HCT VFR BLD AUTO: 44.1 % (ref 34.8–46.1)
HGB BLD-MCNC: 14.2 G/DL (ref 11.5–15.4)
IMM GRANULOCYTES # BLD AUTO: 0.03 THOUSAND/UL (ref 0–0.2)
IMM GRANULOCYTES NFR BLD AUTO: 0 % (ref 0–2)
LYMPHOCYTES # BLD AUTO: 5.14 THOUSANDS/ÂΜL (ref 0.6–4.47)
LYMPHOCYTES NFR BLD AUTO: 44 % (ref 14–44)
MCH RBC QN AUTO: 31.1 PG (ref 26.8–34.3)
MCHC RBC AUTO-ENTMCNC: 32.2 G/DL (ref 31.4–37.4)
MCV RBC AUTO: 97 FL (ref 82–98)
MONOCYTES # BLD AUTO: 1.47 THOUSAND/ÂΜL (ref 0.17–1.22)
MONOCYTES NFR BLD AUTO: 12 % (ref 4–12)
NEUTROPHILS # BLD AUTO: 4.97 THOUSANDS/ÂΜL (ref 1.85–7.62)
NEUTS SEG NFR BLD AUTO: 41 % (ref 43–75)
NRBC BLD AUTO-RTO: 0 /100 WBCS
PLATELET # BLD AUTO: 452 THOUSANDS/UL (ref 149–390)
PMV BLD AUTO: 10.3 FL (ref 8.9–12.7)
RBC # BLD AUTO: 4.57 MILLION/UL (ref 3.81–5.12)
TSH SERPL DL<=0.05 MIU/L-ACNC: 1.64 UIU/ML (ref 0.45–4.5)
WBC # BLD AUTO: 11.96 THOUSAND/UL (ref 4.31–10.16)

## 2024-01-24 PROCEDURE — 85025 COMPLETE CBC W/AUTO DIFF WBC: CPT

## 2024-01-30 DIAGNOSIS — I10 HYPERTENSION, UNSPECIFIED TYPE: ICD-10-CM

## 2024-01-30 DIAGNOSIS — E78.5 HYPERLIPIDEMIA, UNSPECIFIED HYPERLIPIDEMIA TYPE: ICD-10-CM

## 2024-01-30 RX ORDER — LISINOPRIL 20 MG/1
TABLET ORAL
Qty: 90 TABLET | Refills: 1 | Status: SHIPPED | OUTPATIENT
Start: 2024-01-30

## 2024-01-30 RX ORDER — ROSUVASTATIN CALCIUM 20 MG/1
TABLET, COATED ORAL
Qty: 90 TABLET | Refills: 1 | Status: SHIPPED | OUTPATIENT
Start: 2024-01-30

## 2024-02-28 ENCOUNTER — OFFICE VISIT (OUTPATIENT)
Dept: HEMATOLOGY ONCOLOGY | Facility: CLINIC | Age: 69
End: 2024-02-28
Payer: MEDICARE

## 2024-02-28 VITALS
SYSTOLIC BLOOD PRESSURE: 120 MMHG | HEART RATE: 78 BPM | HEIGHT: 63 IN | DIASTOLIC BLOOD PRESSURE: 70 MMHG | TEMPERATURE: 97.7 F | WEIGHT: 208 LBS | OXYGEN SATURATION: 98 % | BODY MASS INDEX: 36.86 KG/M2

## 2024-02-28 DIAGNOSIS — E66.01 OBESITY, MORBID (HCC): ICD-10-CM

## 2024-02-28 DIAGNOSIS — D72.829 LEUKOCYTOSIS, UNSPECIFIED TYPE: Primary | ICD-10-CM

## 2024-02-28 DIAGNOSIS — D75.839 THROMBOCYTOSIS: ICD-10-CM

## 2024-02-28 PROCEDURE — 99213 OFFICE O/P EST LOW 20 MIN: CPT | Performed by: PHYSICIAN ASSISTANT

## 2024-02-28 NOTE — PROGRESS NOTES
Hematology/Oncology Outpatient Follow-up  Nadia Palacio 68 y.o. female 1955 [id number]    Date:  2/28/2024      Assessment and Plan:  1. Leukocytosis, unspecified type  WBC elevated with elevated lymphocytes and monocytes, both mild elevations.  This remains chronic.  Flow cytometry has already been negative.  Likely this is reactive.  Monitoring favored.  Follow-up 1 year.    - CBC and differential; Future    2. Thrombocytosis  Platelets remain elevated but are stable JAK2 was negative.  Likely this is reactive monitoring fever follow-up in 1 year.    - CBC and differential; Future    3. Obesity, morbid (HCC)  Follow up per pcp      HPI:  68-year-old female presents for follow-up.  She was last seen in November 2020. She is being followed due to history of breast cancer.  She had history of stage IA left-sided breast cancer, grade 3, triple negative disease, diagnosed in 2014.  he underwent mastectomy resulting in MITZI followed by adjuvant chemotherapy, AC followed by paclitaxel.  She had remained in observation.  She was last seen by Dr. Ramos in November 2020.  He recommended follow-up p.r.n. due to greater than 5 years since diagnosis and to complete imaging with her primary care doctor.      She continues to follow up at this time due to history of elevated WBC and platelet count.     JAK2 negative, flow cytometry of the blood negative     Colonoscopy UTD from Dec 2021.     Interval history:  Routine mammo in November showed an enlarged lymph node in the right axillary area.  Biopsy was completed.  This showed dermatopathic lymphangitis, negative for lymphoproliferative disorder or malignancy  Continue to follow up with derm, avoids her allergy triggers   Never had food testing for allergies     ROS: Review of Systems   Constitutional:  Negative for activity change, appetite change, chills, fatigue, fever and unexpected weight change.   Respiratory:  Negative for cough and shortness of breath.     Cardiovascular:  Negative for chest pain, palpitations and leg swelling.   Gastrointestinal:  Negative for abdominal pain, constipation, diarrhea, nausea and vomiting.   Genitourinary:  Negative for difficulty urinating, dysuria and hematuria.   Musculoskeletal:  Negative for arthralgias.   Skin:  Negative for rash.   Neurological:  Negative for dizziness, weakness, light-headedness, numbness and headaches.   Hematological: Negative.    Psychiatric/Behavioral: Negative.       Past Medical History:   Diagnosis Date    Alopecia totalis     Arthritis of knee     bilateral    Basal cell carcinoma (BCC) of chest     Breast CA (HCC) 2014    Left    Cancer (HCC) 07/2021    Basal cell carcinoma on chest    Colon polyp     Disease of thyroid gland     Herpes zoster     History of chemotherapy 2014    Hyperlipidemia     Hypertension     Hypothyroidism     Lymphadenopathy, mediastinal     Menopause     Multiple allergies     Murmur, cardiac     PHN (postherpetic neuralgia)     PONV (postoperative nausea and vomiting)     Postherpetic neuralgia     Thrombocytopathy (HCC)        Past Surgical History:   Procedure Laterality Date    BASAL CELL CARCINOMA EXCISION  08/2021    BREAST LUMPECTOMY Left 2014    BREAST RECONSTRUCTION Left     COLONOSCOPY      also needed CT of colon    COLONOSCOPY      MASTECTOMY Left 2014    OVARIAN CYST REMOVAL  2002    REPLACEMENT TOTAL KNEE Bilateral     R 01/21/20, L 02/25/20    SENTINEL LYMPH NODE BIOPSY Left 2014    TUBAL LIGATION Bilateral     UPPER GASTROINTESTINAL ENDOSCOPY      US GUIDED BREAST LYMPH NODE BIOPSY RIGHT Right 11/29/2023    WISDOM TOOTH EXTRACTION         Social History     Socioeconomic History    Marital status: /Civil Union     Spouse name: None    Number of children: None    Years of education: None    Highest education level: None   Occupational History     Employer: [employer]   Tobacco Use    Smoking status: Former     Current packs/day: 0.00     Average  packs/day: 0.5 packs/day for 27.0 years (13.5 ttl pk-yrs)     Types: Cigarettes     Start date:      Quit date:      Years since quittin.1    Smokeless tobacco: Never   Vaping Use    Vaping status: Never Used   Substance and Sexual Activity    Alcohol use: Yes     Alcohol/week: 10.0 standard drinks of alcohol     Types: 10 Standard drinks or equivalent per week    Drug use: Never    Sexual activity: Yes     Birth control/protection: Post-menopausal   Other Topics Concern    None   Social History Narrative    None     Social Determinants of Health     Financial Resource Strain: Low Risk  (2023)    Overall Financial Resource Strain (CARDIA)     Difficulty of Paying Living Expenses: Not hard at all   Food Insecurity: No Food Insecurity (2020)    Hunger Vital Sign     Worried About Running Out of Food in the Last Year: Never true     Ran Out of Food in the Last Year: Never true   Transportation Needs: No Transportation Needs (2023)    PRAPARE - Transportation     Lack of Transportation (Medical): No     Lack of Transportation (Non-Medical): No   Physical Activity: Insufficiently Active (2019)    Exercise Vital Sign     Days of Exercise per Week: 4 days     Minutes of Exercise per Session: 30 min   Stress: No Stress Concern Present (2019)    [employer] - Occupational Stress Questionnaire     Feeling of Stress : Only a little   Social Connections: Moderately Integrated (2019)    Social Connection and Isolation Panel [NHANES]     Frequency of Communication with Friends and Family: More than three times a week     Frequency of Social Gatherings with Friends and Family: More than three times a week     Attends L[employer]-day Services: 1 to 4 times per year     Active Member of Clubs or Organizations: No     Attends Club or Organization Meetings: Never     Marital Status:    Intimate Partner Violence: Not At Risk (2019)    Humiliation, Afraid,  Rape, and Kick questionnaire     Fear of Current or Ex-Partner: No     Emotionally Abused: No     Physically Abused: No     Sexually Abused: No   Housing Stability: Not on file       Family History   Problem Relation Age of Onset    Diabetes Father     Hypertension Father     Bone cancer Father 60    Multiple myeloma Father 60    Diabetes Sister     Polycythemia Sister     Heart attack Mother     Stroke Mother         CVA?    Breast cancer Maternal Aunt         age dx unknown    Other Maternal Uncle         Brain tumor    No Known Problems Daughter     No Known Problems Maternal Grandmother     No Known Problems Maternal Grandfather     No Known Problems Paternal Grandmother     No Known Problems Paternal Grandfather     No Known Problems Sister     No Known Problems Daughter     No Known Problems Maternal Aunt     No Known Problems Maternal Aunt     No Known Problems Maternal Aunt     No Known Problems Maternal Aunt     No Known Problems Maternal Aunt     No Known Problems Maternal Aunt        Allergies   Allergen Reactions    Clarithromycin     Clindamycin Abdominal Pain    Fluticasone Swelling    Other     Sulfanilamide     Adhesive [Medical Tape] Rash    Cobalt Rash    Formaldehyde Rash    Gold Sodium Thiomalate Rash    Nickel Rash    Penicillins Rash    Propolis Rash    Sulfa Antibiotics Rash         Current Outpatient Medications:     ascorbic acid (VITAMIN C) 500 MG tablet, Take by mouth, Disp: , Rfl:     aspirin (ECOTRIN LOW STRENGTH) 81 mg EC tablet, Take 81 mg by mouth daily, Disp: , Rfl:     Calcium Carb-Cholecalciferol 1000-800 MG-UNIT TABS, Take by mouth, Disp: , Rfl:     Cetirizine HCl 10 MG CAPS, Take by mouth, Disp: , Rfl:     EPINEPHrine (EPIPEN) 0.3 mg/0.3 mL SOAJ, INJECT 0.3 ML (0.3 MG TOTAL) INJECT INTO THE MUSCLE ONE TIME FOR 1 DOSE., Disp: , Rfl:     levothyroxine 137 mcg tablet, TAKE 1 TABLET BY MOUTH EVERY DAY, Disp: 90 tablet, Rfl: 1    lisinopril (ZESTRIL) 20 mg tablet, TAKE 1 TABLET BY  "MOUTH EVERY DAY, Disp: 90 tablet, Rfl: 1    montelukast (SINGULAIR) 10 mg tablet, TAKE 1 TABLET BY MOUTH EVERYDAY AT BEDTIME, Disp: 90 tablet, Rfl: 2    Multiple Vitamin (MULTI-DAY VITAMINS) TABS, Take by mouth, Disp: , Rfl:     Omega-3 Fatty Acids (Fish Oil) 1200 MG CAPS, , Disp: , Rfl:     Psyllium (METAMUCIL FIBER PO), Take by mouth, Disp: , Rfl:     Restasis 0.05 % ophthalmic emulsion, , Disp: , Rfl:     rosuvastatin (CRESTOR) 20 MG tablet, TAKE 1 TABLET BY MOUTH EVERY DAY, Disp: 90 tablet, Rfl: 1    tacrolimus (PROTOPIC) 0.1 % ointment, APPLY TO AFFECTED AREAS ONCE TO TWICE DAILY AS NEEDED ON AFFECTED AREAS AROUND THE EYES, Disp: , Rfl:     VITAMIN E COMPLEX PO, Take by mouth, Disp: , Rfl:       Physical Exam:  /70 (BP Location: Right arm, Patient Position: Sitting, Cuff Size: Large)   Pulse 78   Temp 97.7 °F (36.5 °C) (Temporal)   Ht 5' 3\" (1.6 m)   Wt 94.3 kg (208 lb)   SpO2 98%   BMI 36.85 kg/m²     Physical Exam  Vitals reviewed.   Constitutional:       General: She is not in acute distress.     Appearance: She is well-developed. She is not ill-appearing.   HENT:      Head: Normocephalic and atraumatic.   Eyes:      General: No scleral icterus.     Conjunctiva/sclera: Conjunctivae normal.   Cardiovascular:      Rate and Rhythm: Normal rate and regular rhythm.      Heart sounds: Normal heart sounds. No murmur heard.  Pulmonary:      Effort: Pulmonary effort is normal. No respiratory distress.      Breath sounds: Normal breath sounds.   Abdominal:      Palpations: Abdomen is soft.      Tenderness: There is no abdominal tenderness.   Musculoskeletal:         General: No tenderness. Normal range of motion.      Cervical back: Normal range of motion and neck supple.      Right lower leg: No edema.      Left lower leg: No edema.   Lymphadenopathy:      Cervical: No cervical adenopathy.   Skin:     General: Skin is warm and dry.   Neurological:      Mental Status: She is alert and oriented to person, " place, and time.      Cranial Nerves: No cranial nerve deficit.   Psychiatric:         Mood and Affect: Mood normal.         Behavior: Behavior normal.       Labs:      I have spent 20 minutes with Patient  today in which greater than 50% of this time was spent in counseling/coordination of care regarding Diagnostic results, Patient and family education, Impressions, Documenting in the medical record, Reviewing / ordering tests, medicine, procedures  , and Obtaining or reviewing history  .    Patient voiced understanding and agreement in the above discussion. Aware to contact our office with questions/symptoms in the interim.     This note has been generated by voice recognition software system.  Therefore, there may be spelling, grammar, and or syntax errors. Please contact if questions arise.

## 2024-03-07 ENCOUNTER — EVALUATION (OUTPATIENT)
Dept: PHYSICAL THERAPY | Facility: CLINIC | Age: 69
End: 2024-03-07
Payer: MEDICARE

## 2024-03-07 DIAGNOSIS — Z85.3 HX OF BREAST CANCER: ICD-10-CM

## 2024-03-07 DIAGNOSIS — I89.0 LYMPHEDEMA: Primary | ICD-10-CM

## 2024-03-07 PROCEDURE — 97162 PT EVAL MOD COMPLEX 30 MIN: CPT | Performed by: PHYSICAL THERAPIST

## 2024-03-07 NOTE — PROGRESS NOTES
PT Evaluation     Today's date: 3/7/2024  Patient name: Nadia Palacio  : 1955  MRN: [id number]  Referring provider: Will Rivera CRNP  Dx:   Encounter Diagnosis     ICD-10-CM    1. Lymphedema  I89.0       2. Hx of breast cancer  Z85.3                      Lymphedema Physical Assessment    SUBJECTIVE EVALUATION:  History of present illness:  Pt seen for assessment as she recently had f/u w/ CA survivorship and had some concern regarding swelling/edema presence in UE.  She has hx of breast cancer, stage IA left-sided breast cancer, grade 3, triple negative disease, diagnosed in .  She underwent mastectomy resulting in an ED followed by adjuvant chemotherapy.  Pt reports she notices some swelling in her L forearm to elbow.     3/7/2023 Update: Pt seen for RA this day w/ plan to check fitment of new compression garments.  Pt admits during hiatus in care of nearly 2 months she was away on vacation, enjoyed eating and drinking, in turn feels L UE may in fact be heavier.     Pain (0-10): 0-1/10   Location: L UE    OBJECTIVE:    Pulse: (0 no, 1+ diminished, 2+ mildly diminished, 3+ normal, 4 bounding)     Radial: 3+      Capillary Refill (=< 3 seconds is normal): normal    Palpation: Mild heaviness noted thru forearm, elbow, and triceps region of L UE    Pitting: Trace    Stemmer's Sign: Neg    Gait assessment: WNL    Transfer status: I    Ability to don/doff clothing/garments: I    LE ROM: n/a  Knee flexion limitation:  Ankle DF/PF limitation:    UE ROM: R WNL  Shldr OH flex:  L 178 deg A, 180 P  Shldr OH abd: L 174 deg A, 180 P  Shldr ER: L 88 deg A, 90 P  Shldr IR: 60 deg A, 68 deg P  Elbow flex/ext: WNL  Wrist flex/ext: WNL    Strength:  UE: Good, 4+ to 5/5 t/o  LE:    Self management:  Home compression pump: no  Compression stockings: arm sleeve for travel  Lymphedema exercise: yes    Skin Assessment:  Skin mobility:  Good, mild heaviness in forearm region/tautness to forearm and triceps  "areas  Fibrosis (0 normal - 4 >severe): 0-1  Erythema scale (1 very faint, 2 faint, 3 bright, 4 very bright): 1  Hemosiderin staining present: no  Blister present: no   Location:   Color:   Size:  Wound present: no   Location:   Color:   Size:    Visualization:  Bony prominences:  Good, ulnar and radial prominences well visualized  Tendon pathways: Good, WNL and comparable to unaffected side thru dorsum of hand  Joint creases: Good, no visible accumulation of edema thru fingers of L hand    Lymphedema classification (0 latent, 1 reverse, 2 non-rev, 3 elephantiasis): 1    PMHx:  Cellulitis: no   Most recent:  Cardiac: no  Renal: no  Hepatic: no  Cancer: Yes   Type: L breast   Treatment: L breast mastectomy w/ reconstruction, adjuvant chemo  Surgical: no       UPPER EXTREMITY LEFT CALCULATIONS      Flowsheet Row Most Recent Value   Volume UE (mL) 3013   Difference from last visit (mL)  -204   Difference from first visit (mL)  123   Difference from last visit (%)  -7   Difference from first visit (%)  4                >> REFER TO FLOW SHEET FOR GIRTH MEASUREMENTS <<    ASSESSMENT:  Assessment details:  Pt RA this day regarding ongoing L UE lymphedema w/ overall a volume reduction achieved, however pt had previosuly achieved greater reduction thru L UE before vacation out of the country which entailed flying.  Pt has maintained 2.75\" TG thru hand, wrist, and forearm however will benefit from more effective compression of entire L arm as measurements this day suggest increase thru L upper arm.  Air travel, diet, and limited use of compression this past wk leading up to RA are likely contributing factors.  Will re-measure NV and compare results, consider bandaging to achieve optimal reduction and fit then for arm sleeve and gauntlet.  Requires cont'd skilled PT to address and achieve established goals.      Goals:  ST. Pt educated in self MLD within 2 wks-MET  2. Pt fitted for proper compression sleeve within 2 " "wks-MET  3. Reduce L UE volume >2% within 3 wks-MET    LT. Pt I in self MLD and phase 2 CDT protocol within 6 wks-In progress  2. L UE volume = R within 6 wks-In progress  3. Proper fitment of L UE compression sleeves within 6 wks    Lymphedema Life Impact Scale    Score: NV  % Impairment:  Infection Occurrence:    FOTO score: n/a    PLAN:  Patient would benefit from: skilled physical therapy  Planned therapy interventions: manual therapy, massage, compression, home exercise program and patient education  Frequency: 1-2x/wk  Duration in visits: 12  Duration in weeks: 6  Plan of Care beginning date: 3/7/2024  Plan of Care expiration date: 2024  Treatment plan discussed with: patient      Assessment: Tolerated treatment well. Patient would benefit from continued PT  Measured this day, test fit new custom armsleeve C-G 20-30 mmHg Jobst elvarex, beige color CCL2. Silicone band at top.  Pt requires custom fitting due to irregularity in shape of arm, Hx of L breast CA related lymphedema.  Also tested CCL1 Jobst relax armsleeve nightime garment necessary to manage chronic L UE lymphedema, again requiring custom fot garment due to irregularity of shape and volume in arm. Unfortunately thee has been a sig vol increase of L UE since previous assessment and fitment for garments, most notable in forearm and elbow regions which prevents proper fitment of compression garments.  Now advise skilled MLD and compression bandaging to address vol increase, promote reduction, and to again then test fit garments.               Precautions: Hx CA, s/p L mastectomy      Manuals 8- 9-5 9-21 10-19 11-9 12-5 12-27 1-10 1-17 3-7   MLD L UE [facility] [place]   Self MLD instruction [facility], fam instr.           Compression TG 2.75\" forearm TG 2.75\" forearm TG 2.75\" forearm TG 2,75\" forearm Arm sleeve armsleeve armsleeve armsleeve armsleeve amrsleeve   Measurements    [facility]   Neuro Re-Ed           "                                                                                              Ther Ex                                                                                                                     Ther Activity                                       Gait Training                                       Modalities

## 2024-03-13 ENCOUNTER — OFFICE VISIT (OUTPATIENT)
Dept: PHYSICAL THERAPY | Facility: CLINIC | Age: 69
End: 2024-03-13
Payer: MEDICARE

## 2024-03-13 DIAGNOSIS — I89.0 LYMPHEDEMA: Primary | ICD-10-CM

## 2024-03-13 DIAGNOSIS — Z85.3 HX OF BREAST CANCER: ICD-10-CM

## 2024-03-13 PROCEDURE — 97140 MANUAL THERAPY 1/> REGIONS: CPT | Performed by: PHYSICAL THERAPIST

## 2024-03-13 NOTE — PROGRESS NOTES
Daily Note     Today's date: 3/13/2024  Patient name: Nadia Palacio  : 1955  MRN: [id number]  Referring provider: Will Rivera CRNP  Dx:   Encounter Diagnosis     ICD-10-CM    1. Lymphedema  I89.0       2. Hx of breast cancer  Z85.3                      Subjective: Pt presents reporting cont;d heaviness in arm, tried her garments again, too tight.       Objective: See treatment diary below      Assessment: Tolerated treatment well. Patient would benefit from continued PT  Bandaged this day A1-G, full arm.  Instructed in precautions and tolerance, reasons to remove if necessary.  Will remove in appt tomorrow AM and re-measure + retry garment fitment.       Plan: Continue per plan of care.      Precautions: Hx CA, s/p L mastectomy      Manuals 3-13-24      12-27 1-10 1-17 3-7   MLD L UE [facility] NV   Self MLD instruction             Compression Bandage- alexis hdez   Measurements         [facility]   Neuro Re-Ed                                                                                                        Ther Ex                                                                                                                     Ther Activity                                       Gait Training                                       Modalities

## 2024-03-14 ENCOUNTER — OFFICE VISIT (OUTPATIENT)
Dept: PHYSICAL THERAPY | Facility: CLINIC | Age: 69
End: 2024-03-14
Payer: MEDICARE

## 2024-03-14 DIAGNOSIS — Z85.3 HX OF BREAST CANCER: ICD-10-CM

## 2024-03-14 DIAGNOSIS — I89.0 LYMPHEDEMA: Primary | ICD-10-CM

## 2024-03-14 PROCEDURE — 97140 MANUAL THERAPY 1/> REGIONS: CPT | Performed by: PHYSICAL THERAPIST

## 2024-03-14 NOTE — PROGRESS NOTES
Daily Note     Today's date: 3/14/2024  Patient name: Nadia Palacio  : 1955  MRN: [id number]  Referring provider: Will Rivera CRNP  Dx:   Encounter Diagnosis     ICD-10-CM    1. Lymphedema  I89.0       2. Hx of breast cancer  Z85.3                      Subjective: Pt presents having tolerated bandaging overnight, ready for removal.       Objective: See treatment diary below    UPPER EXTREMITY LEFT CALCULATIONS      Flowsheet Row Most Recent Value   Volume UE (mL) 2801   Difference from last visit (mL)  212   Difference from first visit (mL)  335   Difference from last visit (%)  7   Difference from first visit (%)  11              Assessment: Tolerated treatment well. Patient would benefit from continued PT  Measurements show vol reduction to previous state when fitted for garments, those are fit today and found to be suitable.  Will trial use x1wk and f/u.       Plan: Continue per plan of care.      Precautions: Hx CA, s/p L mastectomy      Manuals 3-13-24 3-14     12-27 1-10 1-17 3-7   MLD L UE jph jph     jph jph jp NV   Self MLD instruction             Compression Bandage- [facility] garments     armsleeve armsleeve armsleeve amrsleeve   Measurements         [facility]   Neuro Re-Ed                                                                                                        Ther Ex                                                                                                                     Ther Activity                                       Gait Training                                       Modalities

## 2024-03-29 ENCOUNTER — APPOINTMENT (OUTPATIENT)
Dept: PHYSICAL THERAPY | Facility: CLINIC | Age: 69
End: 2024-03-29
Payer: MEDICARE

## 2024-04-08 ENCOUNTER — OFFICE VISIT (OUTPATIENT)
Dept: PHYSICAL THERAPY | Facility: CLINIC | Age: 69
End: 2024-04-08
Payer: MEDICARE

## 2024-04-08 DIAGNOSIS — I89.0 LYMPHEDEMA: Primary | ICD-10-CM

## 2024-04-08 DIAGNOSIS — Z85.3 HX OF BREAST CANCER: ICD-10-CM

## 2024-04-08 PROCEDURE — 97140 MANUAL THERAPY 1/> REGIONS: CPT | Performed by: PHYSICAL THERAPIST

## 2024-04-08 NOTE — PROGRESS NOTES
"Daily Note     Today's date: 2024  Patient name: Nadia Palacio  : 1955  MRN: [id number]  Referring provider: Will Rivera CRNP  Dx:   Encounter Diagnosis     ICD-10-CM    1. Lymphedema  I89.0       2. Hx of breast cancer  Z85.3                      Subjective: Pt presents admitting cont'd use of her old garment, did burn x2 fingers on her hand and also bruised dorsal aspect of hand after striking it on a door at home leading to hand being a bit more swollen and sensitive.       Objective: See treatment diary below      Assessment: Tolerated treatment well. Patient would benefit from continued PT  Able to fit daytime compression sleeve this day but w/ significant level of A to don and mod strain, pt stating \"it feels god once on\".  Will assess response NV.  There is a visible vol reduction compared to last assessment.  Will measure next visit.       Plan: Continue per plan of care.      Precautions: Hx CA, s/p L mastectomy      Manuals 3-13-24 3-14 4-8    12-27 1-10 1-17 3-7   MLD L UE [facility] NV   Self MLD instruction             Compression Bandage- [facility] garments Garment- new    armsleeve armsleeve armsleeve amrsleeve   Measurements         [facility]   Neuro Re-Ed                                                                                                        Ther Ex                                                                                                                     Ther Activity                                       Gait Training                                       Modalities                                              "
No

## 2024-04-11 ENCOUNTER — RA CDI HCC (OUTPATIENT)
Dept: OTHER | Facility: HOSPITAL | Age: 69
End: 2024-04-11

## 2024-04-15 ENCOUNTER — OFFICE VISIT (OUTPATIENT)
Dept: PHYSICAL THERAPY | Facility: CLINIC | Age: 69
End: 2024-04-15
Payer: MEDICARE

## 2024-04-15 DIAGNOSIS — I89.0 LYMPHEDEMA: Primary | ICD-10-CM

## 2024-04-15 DIAGNOSIS — Z85.3 HX OF BREAST CANCER: ICD-10-CM

## 2024-04-15 PROCEDURE — 97140 MANUAL THERAPY 1/> REGIONS: CPT | Performed by: PHYSICAL THERAPIST

## 2024-04-15 NOTE — PROGRESS NOTES
Daily Note     Today's date: 4/15/2024  Patient name: Nadia Palacio  : 1955  MRN: [id number]  Referring provider: Will Rivera CRNP  Dx:   Encounter Diagnosis     ICD-10-CM    1. Lymphedema  I89.0       2. Hx of breast cancer  Z85.3                      Subjective: Pt reports use of new garment but removes when hand seems to swell.  Using new night time garment as well.       Objective: See treatment diary below    UPPER EXTREMITY LEFT CALCULATIONS      Flowsheet Row Most Recent Value   Volume UE (mL) 2708   Difference from last visit (mL)  93   Difference from first visit (mL)  428   Difference from last visit (%)  3   Difference from first visit (%)  14              Assessment: Tolerated treatment well. Patient would benefit from continued PT  Measurements show further mild vol reduction of L arm, better fitment of garments, but still requires sig A to don new daytime, in part b/c pt has maintained lotion to L UE prior to tx.      Plan: Continue per plan of care.      Precautions: Hx CA, s/p L mastectomy      Manuals 3-24 3-14 4-8 4-15   12-27 1-10 1-17 3-7   MLD L UE jp jph jph jph   [facility] [place]   Self MLD instruction             Compression Bandage- [facility] garments Garment- new Garment- new   armsleeve armsleeve armsleeve amrslechanel   Measurements         [facility]   Neuro Re-Ed                                                                                                        Ther Ex                                                                                                                     Ther Activity                                       Gait Training                                       Modalities

## 2024-04-17 ENCOUNTER — OFFICE VISIT (OUTPATIENT)
Dept: FAMILY MEDICINE CLINIC | Facility: CLINIC | Age: 69
End: 2024-04-17
Payer: MEDICARE

## 2024-04-17 VITALS
WEIGHT: 203 LBS | DIASTOLIC BLOOD PRESSURE: 64 MMHG | TEMPERATURE: 98.3 F | SYSTOLIC BLOOD PRESSURE: 122 MMHG | HEART RATE: 74 BPM | OXYGEN SATURATION: 95 % | HEIGHT: 63 IN | BODY MASS INDEX: 35.97 KG/M2

## 2024-04-17 DIAGNOSIS — Z00.00 MEDICARE ANNUAL WELLNESS VISIT, SUBSEQUENT: ICD-10-CM

## 2024-04-17 DIAGNOSIS — I10 HYPERTENSION, UNSPECIFIED TYPE: Primary | ICD-10-CM

## 2024-04-17 DIAGNOSIS — Z17.1 MALIGNANT NEOPLASM OF UPPER-OUTER QUADRANT OF LEFT BREAST IN FEMALE, ESTROGEN RECEPTOR NEGATIVE (HCC): ICD-10-CM

## 2024-04-17 DIAGNOSIS — C50.412 MALIGNANT NEOPLASM OF UPPER-OUTER QUADRANT OF LEFT BREAST IN FEMALE, ESTROGEN RECEPTOR NEGATIVE (HCC): ICD-10-CM

## 2024-04-17 DIAGNOSIS — E78.5 HYPERLIPIDEMIA, UNSPECIFIED HYPERLIPIDEMIA TYPE: ICD-10-CM

## 2024-04-17 DIAGNOSIS — I89.0 LYMPHEDEMA OF LEFT ARM: ICD-10-CM

## 2024-04-17 DIAGNOSIS — E03.9 ACQUIRED HYPOTHYROIDISM: ICD-10-CM

## 2024-04-17 PROBLEM — D47.3 ESSENTIAL (HEMORRHAGIC) THROMBOCYTHEMIA (HCC): Status: RESOLVED | Noted: 2018-10-30 | Resolved: 2024-04-17

## 2024-04-17 PROCEDURE — 99214 OFFICE O/P EST MOD 30 MIN: CPT | Performed by: INTERNAL MEDICINE

## 2024-04-17 PROCEDURE — G0438 PPPS, INITIAL VISIT: HCPCS | Performed by: INTERNAL MEDICINE

## 2024-04-17 NOTE — PROGRESS NOTES
"Assessment/Plan:           Problem List Items Addressed This Visit       Hypothyroidism    Relevant Orders    TSH, 3rd generation with Free T4 reflex    Lymphedema of left arm     Other Visit Diagnoses       Hypertension, unspecified type    -  Primary    Hyperlipidemia, unspecified hyperlipidemia type        Relevant Orders    Comprehensive metabolic panel    Lipid panel    Malignant neoplasm of upper-outer quadrant of left breast in female, estrogen receptor negative (HCC)                  Subjective:      Patient ID: Nadia Palacio is a 68 y.o. female.    HPI  Patient with hypertension hyperlipidemia hypothyroidism  left breast carcinoma is here to follow-up on chronic medical problems.  Blood pressure is excellent continue current regimen denies any chest pain shortness Palpitation.   She is trying to lose weight and is on export restricted diet as well as walking regularly.  Lab studies are due.     she is followed regularly with GYN as well as hematology oncology.  She agrees lymphedema sleep and has been seeing lymphedema clinic   DEXA scan is scheduled and right breast screening mammogram.  Colonoscopy 2021 was good and patient was advised to follow-up in 3 years.  Patient is up-to-date with vaccination.   The following portions of the patient's history were reviewed and updated as appropriate: allergies, current medications, past family history, past medical history, past social history, past surgical history, and problem list.    Review of Systems      Objective:      /64   Pulse 74   Temp 98.3 °F (36.8 °C)   Ht 5' 3\" (1.6 m)   Wt 92.1 kg (203 lb)   SpO2 95%   BMI 35.96 kg/m²          Physical Exam  Cardiovascular:      Rate and Rhythm: Normal rate and regular rhythm.      Heart sounds: Normal heart sounds. No murmur heard.  Pulmonary:      Effort: Pulmonary effort is normal. No respiratory distress.      Breath sounds: Normal breath sounds. No wheezing or rales.   Abdominal:      General: There is " no distension.      Tenderness: There is no abdominal tenderness. There is no guarding.   Musculoskeletal:      Right lower leg: No edema.      Left lower leg: No edema.      Comments: Left arm lymphedema   Neurological:      Mental Status: She is alert.   Psychiatric:         Mood and Affect: Mood normal.         Behavior: Behavior normal.               Depression Screening and Follow-up Plan: Patient was screened for depression during today's encounter. They screened negative with a PHQ-2 score of 0.     Nadia is here for her Subsequent Wellness visit.     Health Risk Assessment:   Patient rates overall health as very good. Patient feels that their physical health rating is same. Patient is very satisfied with their life. Eyesight was rated as same. Hearing was rated as same. Patient feels that their emotional and mental health rating is same. Patients states they are never, rarely angry. Patient states they are never, rarely unusually tired/fatigued. Pain experienced in the last 7 days has been none. Patient states that she has experienced no weight loss or gain in last 6 months.     Depression Screening:   PHQ-2 Score: 0      Fall Risk Screening:   In the past year, patient has experienced: no history of falling in past year      Urinary Incontinence Screening:   Patient has not leaked urine accidently in the last six months.     Home Safety:  Patient does not have trouble with stairs inside or outside of their home. Patient has working smoke alarms and has working carbon monoxide detector. Home safety hazards include: none.     Nutrition:   Current diet is Regular.     Medications:   Patient is currently taking over-the-counter supplements. OTC medications include: see medication list. Patient is able to manage medications.     Activities of Daily Living (ADLs)/Instrumental Activities of Daily Living (IADLs):   Walk and transfer into and out of bed and chair?: Yes  Dress and groom yourself?: Yes    Bathe or  shower yourself?: Yes    Feed yourself? Yes  Do your laundry/housekeeping?: Yes  Manage your money, pay your bills and track your expenses?: Yes  Make your own meals?: Yes    Do your own shopping?: Yes    Previous Hospitalizations:   Any hospitalizations or ED visits within the last 12 months?: No      Advance Care Planning:   Living will: Yes    Durable POA for healthcare: Yes    Advanced directive: Yes      Cognitive Screening:   Provider or family/friend/caregiver concerned regarding cognition?: No    PREVENTIVE SCREENINGS      Cardiovascular Screening:    General: Screening Not Indicated and History Lipid Disorder    Due for: Lipid Panel      Diabetes Screening:     General: Screening Current    Due for: Blood Glucose      Colorectal Cancer Screening:     General: Screening Current    Due for: Colonoscopy - Low Risk      Breast Cancer Screening:     General: History Breast Cancer and Screening Current      Cervical Cancer Screening:    General: Screening Not Indicated      Osteoporosis Screening:      Due for: DXA Axial      Abdominal Aortic Aneurysm (AAA) Screening:        General: Screening Not Indicated      Lung Cancer Screening:     General: Screening Not Indicated      Hepatitis C Screening:    General: Screening Current    Screening, Brief Intervention, and Referral to Treatment (SBIRT)    Screening  Typical number of drinks in a day: 2  Typical number of drinks in a week: 8  Interpretation: Low risk drinking behavior.    AUDIT-C Screenin) How often did you have a drink containing alcohol in the past year? 2 to 3 times a week  2) How many drinks did you have on a typical day when you were drinking in the past year? 1 to 2  3) How often did you have 6 or more drinks on one occasion in the past year? never    AUDIT-C Score: 3  Interpretation: Score 3-12 (female): POSITIVE screen for alcohol misuse    AUDIT Screenin) How often during the last year have you found that you were not able to stop  drinking once you had started? 0 - never  5) How often during the last year have you failed to do what was normally expected from you because of drinking? 0 - never  6) How often during the last year have you needed a first drink in the morning to get yourself going after a heavy drinking session? 0 - never  7) How often during the last year have you had a feeling of guilt or remorse after drinking? 0 - never  8) How often during the last year have you been unable to remember what happened the night before because you had been drinking? 0 - never  9) Have you or someone else been injured as a result of your drinking? 0 - no  10) Has a relative or friend or a doctor or another health worker been concerned about your drinking or suggested you cut down? 0 - no    AUDIT Score: 3  Interpretation: Low risk alcohol consumption    Single Item Drug Screening:  How often have you used an illegal drug (including marijuana) or a prescription medication for non-medical reasons in the past year? never    Single Item Drug Screen Score: 0  Interpretation: Negative screen for possible drug use disorder

## 2024-04-26 ENCOUNTER — APPOINTMENT (OUTPATIENT)
Dept: PHYSICAL THERAPY | Facility: CLINIC | Age: 69
End: 2024-04-26
Payer: MEDICARE

## 2024-04-29 DIAGNOSIS — E03.9 ACQUIRED HYPOTHYROIDISM: ICD-10-CM

## 2024-04-29 RX ORDER — LEVOTHYROXINE SODIUM 137 UG/1
137 TABLET ORAL DAILY
Qty: 90 TABLET | Refills: 1 | Status: SHIPPED | OUTPATIENT
Start: 2024-04-29

## 2024-05-07 ENCOUNTER — OFFICE VISIT (OUTPATIENT)
Dept: PHYSICAL THERAPY | Facility: CLINIC | Age: 69
End: 2024-05-07
Payer: MEDICARE

## 2024-05-07 DIAGNOSIS — Z85.3 HX OF BREAST CANCER: ICD-10-CM

## 2024-05-07 DIAGNOSIS — I89.0 LYMPHEDEMA: Primary | ICD-10-CM

## 2024-05-07 PROCEDURE — 97140 MANUAL THERAPY 1/> REGIONS: CPT | Performed by: PHYSICAL THERAPIST

## 2024-05-07 NOTE — PROGRESS NOTES
Daily Note     Today's date: 2024  Patient name: Nadia Palacio  : 1955  MRN: [id number]  Referring provider: Will Rivera CRNP  Dx:   Encounter Diagnosis     ICD-10-CM    1. Lymphedema  I89.0       2. Hx of breast cancer  Z85.3                      Subjective: Pt reports more consistent use of new garment.       Objective: See treatment diary below      UPPER EXTREMITY LEFT CALCULATIONS      Flowsheet Row Most Recent Value   Volume UE (mL) 2657   Difference from last visit (mL)  51   Difference from first visit (mL)  479   Difference from last visit (%)  2   Difference from first visit (%)  15                  Assessment: Tolerated treatment well. Patient would benefit from continued PT  Measurements show further mild vol reduction of L arm, better fitment of garments, but still requires sig A to don new daytime.  Cont current POC as pt cont to achieve vol reduction - will f/u and re-measure in 2 wks, potential d/c.       Plan: Continue per plan of care.      Precautions: Hx CA, s/p L mastectomy      Manuals 3-13-24 3-14 4-8 4-15 5-7  12-27 1-10 1-17 3-7   MLD L UE [facility] [place]   Self MLD instruction             Compression Bandage- [facility] garments Garment- new Garment- new Garment-new  armsleeve armsleeve armsleeve amrsleeve   Measurements         [facility]   Neuro Re-Ed                                                                                                        Ther Ex                                                                                                                     Ther Activity                                       Gait Training                                       Modalities

## 2024-05-28 ENCOUNTER — OFFICE VISIT (OUTPATIENT)
Dept: PHYSICAL THERAPY | Facility: CLINIC | Age: 69
End: 2024-05-28
Payer: MEDICARE

## 2024-05-28 DIAGNOSIS — I89.0 LYMPHEDEMA: Primary | ICD-10-CM

## 2024-05-28 DIAGNOSIS — Z85.3 HX OF BREAST CANCER: ICD-10-CM

## 2024-05-28 PROCEDURE — 97140 MANUAL THERAPY 1/> REGIONS: CPT | Performed by: PHYSICAL THERAPIST

## 2024-05-28 NOTE — PROGRESS NOTES
Daily Note     Today's date: 2024  Patient name: Nadia Palacio  : 1955  MRN: [id number]  Referring provider: Will Rivera CRNP  Dx:   Encounter Diagnosis     ICD-10-CM    1. Lymphedema  I89.0       2. Hx of breast cancer  Z85.3                      Subjective: Pt reports cont'd use of garment, more active this time of yr including golfing and outdoor activity.  Nighttime garment is a bit inconvenient but works.       Objective: See treatment diary below        UPPER EXTREMITY LEFT CALCULATIONS      Flowsheet Row Most Recent Value   Volume UE (mL) 2614   Difference from last visit (mL)  43   Difference from first visit (mL)  522   Difference from last visit (%)  2   Difference from first visit (%)  17              Assessment: Tolerated treatment well. Patient would benefit from continued PT  Measurements show further mild vol reduction of L arm, better fitment of garments, but still requires sig A to don new daytime.  Cont current POC as pt cont to achieve vol reduction - will f/u and re-measure in approx 1 month.        Plan: Continue per plan of care.      Precautions: Hx CA, s/p L mastectomy      Manuals 3-13-24 3-14 4-8 4-15 5-7 5-28       MLD L UE [facility] j[facility] j[facility] j[facility]       Self MLD instruction             Compression Bandage- jph garments Garment- new Garment- new Garment-new Garment-new       Measurements             Neuro Re-Ed                                                                                                        Ther Ex                                                                                                                     Ther Activity                                       Gait Training                                       Modalities

## 2024-06-20 ENCOUNTER — OFFICE VISIT (OUTPATIENT)
Dept: PHYSICAL THERAPY | Facility: CLINIC | Age: 69
End: 2024-06-20
Payer: MEDICARE

## 2024-06-20 DIAGNOSIS — Z85.3 HX OF BREAST CANCER: ICD-10-CM

## 2024-06-20 DIAGNOSIS — I89.0 LYMPHEDEMA: Primary | ICD-10-CM

## 2024-06-20 PROCEDURE — 97140 MANUAL THERAPY 1/> REGIONS: CPT | Performed by: PHYSICAL THERAPIST

## 2024-06-20 NOTE — PROGRESS NOTES
Daily Note + PT Discharge    Today's date: 2024  Patient name: Nadia Palacio  : 1955  MRN: [id number]  Referring provider: Will Rivera CRNP  Dx:   Encounter Diagnosis     ICD-10-CM    1. Lymphedema  I89.0       2. Hx of breast cancer  Z85.3                      Subjective: Pt reports use of garments, maintaining limb vol as well as she can despite the heat of summer, being outdoors and on vacation. Pt admits fluctuations related to alcohol consumption, diet, and summer heat.       Objective: See treatment diary below          UPPER EXTREMITY LEFT CALCULATIONS      Flowsheet Row Most Recent Value   Volume UE (mL) 2681   Difference from last visit (mL)  -67   Difference from first visit (mL)  455   Difference from last visit (%)  -3   Difference from first visit (%)  15            Goals:  ST. Pt educated in self MLD within 2 wks-MET  2. Pt fitted for proper compression sleeve within 2 wks-MET  3. Reduce L UE volume >2% within 3 wks-MET    LT. Pt I in self MLD and phase 2 CDT protocol within 6 wks-MET  2. L UE volume = R within 6 wks-Partially met  3. Proper fitment of L UE compression sleeves within 6 wks-MET      Assessment: Tolerated treatment well. Patient would benefit from continued PT  Measurements this day show relative plateau and holding in L UE volume w/ slight increase as anticipated.  Maintains current compression garments and self MLD as able.  Suitable for d/c at this time to phase 2 CDT protocol - self management w/ plan for 3 month f/u and assessment of garments at that time.       Plan: Continue per plan of care.      Precautions: Hx CA, s/p L mastectomy      Manuals 3-13-24 3-14 4-8 4-15 5-7 5-28 6-20      MLD L UE [facility]      Self MLD instruction             Compression Bandage- [facility] garments Garment- new Garment- new Garment-new Garment-new Garment-new      Measurements             Neuro Re-Ed                                                                                                         Ther Ex                                                                                                                     Ther Activity                                       Gait Training                                       Modalities

## 2024-06-28 ENCOUNTER — HOSPITAL ENCOUNTER (OUTPATIENT)
Dept: BONE DENSITY | Facility: MEDICAL CENTER | Age: 69
Discharge: HOME | End: 2024-06-28
Payer: MEDICARE

## 2024-06-28 DIAGNOSIS — Z13.820 SCREENING FOR OSTEOPOROSIS: ICD-10-CM

## 2024-06-28 DIAGNOSIS — Z78.0 MENOPAUSE: ICD-10-CM

## 2024-06-28 PROCEDURE — 77080 DXA BONE DENSITY AXIAL: CPT

## 2024-07-24 DIAGNOSIS — H01.00A BLEPHARITIS OF UPPER AND LOWER EYELIDS OF BOTH EYES, UNSPECIFIED TYPE: ICD-10-CM

## 2024-07-24 DIAGNOSIS — H01.00B BLEPHARITIS OF UPPER AND LOWER EYELIDS OF BOTH EYES, UNSPECIFIED TYPE: ICD-10-CM

## 2024-07-24 DIAGNOSIS — I10 HYPERTENSION, UNSPECIFIED TYPE: ICD-10-CM

## 2024-07-24 DIAGNOSIS — E78.5 HYPERLIPIDEMIA, UNSPECIFIED HYPERLIPIDEMIA TYPE: ICD-10-CM

## 2024-07-24 RX ORDER — LISINOPRIL 20 MG/1
TABLET ORAL
Qty: 100 TABLET | Refills: 1 | Status: SHIPPED | OUTPATIENT
Start: 2024-07-24

## 2024-07-24 RX ORDER — ROSUVASTATIN CALCIUM 20 MG/1
TABLET, COATED ORAL
Qty: 100 TABLET | Refills: 1 | Status: SHIPPED | OUTPATIENT
Start: 2024-07-24

## 2024-07-24 RX ORDER — MONTELUKAST SODIUM 10 MG/1
10 TABLET ORAL
Qty: 100 TABLET | Refills: 1 | Status: SHIPPED | OUTPATIENT
Start: 2024-07-24

## 2024-08-13 DIAGNOSIS — E03.9 ACQUIRED HYPOTHYROIDISM: ICD-10-CM

## 2024-08-14 RX ORDER — LEVOTHYROXINE SODIUM 137 UG/1
137 TABLET ORAL DAILY
Qty: 90 TABLET | Refills: 1 | Status: SHIPPED | OUTPATIENT
Start: 2024-08-14

## 2024-09-16 ENCOUNTER — TELEPHONE (OUTPATIENT)
Age: 69
End: 2024-09-16

## 2024-09-16 NOTE — TELEPHONE ENCOUNTER
April from [employer] calling regarding a form for to update molecular testing. Form was being mailed to [address] [place] [address].      Requested April send the fax to [phone].

## 2024-11-26 ENCOUNTER — TELEPHONE (OUTPATIENT)
Dept: SURGICAL ONCOLOGY | Facility: CLINIC | Age: 69
End: 2024-11-26

## 2024-11-26 NOTE — TELEPHONE ENCOUNTER
Left voicemail for patient in regards to rescheduling survivorship appointment with Will on 12/17 due to Will being out of the office that day. Provided patient with the hopeline number to call and reschedule.

## 2024-12-02 ENCOUNTER — HOSPITAL ENCOUNTER (OUTPATIENT)
Dept: MAMMOGRAPHY | Facility: MEDICAL CENTER | Age: 69
Discharge: HOME | End: 2024-12-02
Payer: MEDICARE

## 2024-12-02 VITALS — WEIGHT: 203 LBS | HEIGHT: 63 IN | BODY MASS INDEX: 35.97 KG/M2

## 2024-12-02 DIAGNOSIS — Z12.31 VISIT FOR SCREENING MAMMOGRAM: ICD-10-CM

## 2024-12-02 PROCEDURE — 77063 BREAST TOMOSYNTHESIS BI: CPT

## 2024-12-02 PROCEDURE — 77067 SCR MAMMO BI INCL CAD: CPT

## 2024-12-04 ENCOUNTER — TELEPHONE (OUTPATIENT)
Age: 69
End: 2024-12-04

## 2024-12-04 ENCOUNTER — PREP FOR PROCEDURE (OUTPATIENT)
Age: 69
End: 2024-12-04

## 2024-12-04 DIAGNOSIS — Z86.0100 HX OF COLONIC POLYPS: Primary | ICD-10-CM

## 2024-12-04 NOTE — TELEPHONE ENCOUNTER
Scheduled date of colonoscopy (as of today): 1/13/2025  Physician performing colonoscopy: DR GAY  Location of colonoscopy: [facility]  Bowel prep reviewed with patient: MIRALAX/DUCOLAX  Instructions reviewed with patient by: Alma via telephone. Procedure directions sent via [employer].  Clearances:

## 2024-12-04 NOTE — TELEPHONE ENCOUNTER
TX of care from Dr. Coppola to Dr. Alanis due to location.   50 year old male with past medical history of hypertension, DM Type 2, hyperlipidemia, found to have a pituitary adenoma on work up for low testosterone level, no complaints of headaches / no changes in vision. Patient was seen by Dr. Bernal in his office for surgical evaluation. On 10/29/2020 patient went for an elective transphenoidal resection of pituitary mass complicated by CSF leak repaired intra-op with duragen and nasoseptal flap with Dr. Bernal and ENT. Patient tolerated procedure well and was in the NSCU for post-op care and management. On 10/31 patient was seen by Internal Medicine for post-op care and management. Endocrinology was following patient post-op management, monitoring of DI. He was found to have low cortisol level, concern for adrenal insufficiency patient was placed on Hydrocortisone 10mg at 8am and 5mg at 3pm.  On 10/31/2020 patient's course complicated by hyponatremia secondary to SIADH, patient was placed on fluid restriction and salt tabs. On 11/4 sodium normalized and was discontinued off salt tabs with repeat being stable. 50 year old male with past medical history of hypertension, DM Type 2, hyperlipidemia, found to have a pituitary adenoma on work up for low testosterone level, no complaints of headaches / no changes in vision. Patient was seen by Dr. Bernal in his office for surgical evaluation. On 10/29/2020 patient went for an elective transphenoidal resection of pituitary mass complicated by CSF leak repaired intra-op with duragen and nasoseptal flap with Dr. Bernal and ENT. Patient tolerated procedure well and was in the NSCU for post-op care and management. On 10/31 patient was seen by Internal Medicine for post-op care and management. Endocrinology was following patient post-op management, monitoring of DI. He was found to have low cortisol level, concern for adrenal insufficiency patient was placed on Hydrocortisone 10mg at 8am and 5mg at 3pm.  On 10/31/2020 patient's course complicated by hyponatremia secondary to SIADH, patient was placed on fluid restriction and salt tabs. On 11/4 sodium normalized and was discontinued off salt tabs with repeat being stable. Patient was evaluated by Physical Therapy and Occupational Therapy and deemed a candidate for home with no skilled needs. On day of discharge, patient is neurologically and hemodynamically stable. 50 year old male with past medical history of hypertension, DM Type 2, hyperlipidemia, found to have a pituitary adenoma on work up for low testosterone level, no complaints of headaches / no changes in vision. Patient was seen by Dr. Bernal in his office for surgical evaluation. On 10/29/2020 patient went for an elective transphenoidal resection of pituitary mass complicated by CSF leak repaired intra-op with duragen and nasoseptal flap with Dr. Bernal and ENT. Patient tolerated procedure well and was in the NSCU for post-op care and management. On 10/31 patient was seen by Internal Medicine for post-op care and management. Endocrinology was following patient post-op management, monitoring of DI. He was found to have low cortisol level, concern for adrenal insufficiency patient was placed on Hydrocortisone 10mg at 8am and 5mg at 3pm.  On 10/31/2020 patient's course complicated by hyponatremia secondary to SIADH, patient was placed on fluid restriction and salt tabs. On 11/4 sodium normalized and was discontinued off salt tabs with repeat being stable. Subsequent sodium levels were stable with no symptoms of DI. Patient had no discharge from his nose and salty taste from the back of his throat. Patient was evaluated by Physical Therapy and Occupational Therapy and deemed a candidate for home with no skilled needs. On day of discharge, patient is neurologically and hemodynamically stable.

## 2024-12-04 NOTE — TELEPHONE ENCOUNTER
12/04/24  Screened by: Alma Hanna MA    Referring Provider 3 yr repeat    Pre- Screening:     There is no height or weight on file to calculate BMI.  Has patient been referred for a routine screening Colonoscopy? yes  Is the patient between 45-75 years old? yes      Previous Colonoscopy yes   If yes:    Date: 12/01/2021     Facility: [facility]    Reason: POSITIVE FIT          Does the patient want to see a Gastroenterologist prior to their procedure OR are they having any GI symptoms? no    Has the patient been hospitalized or had abdominal surgery in the past 6 months? no    Does the patient use supplemental oxygen? no    Does the patient take Coumadin, Lovenox, Plavix, Elliquis, Xarelto, or other blood thinning medication? no    Has the patient had a stroke, cardiac event, or stent placed in the past year? no        If patient is between 45yrs - 49yrs, please advise patient that we will have to confirm benefits & coverage with their insurance company for a routine screening colonoscopy.

## 2024-12-24 ENCOUNTER — TELEPHONE (OUTPATIENT)
Dept: GASTROENTEROLOGY | Facility: MEDICAL CENTER | Age: 69
End: 2024-12-24

## 2024-12-24 NOTE — TELEPHONE ENCOUNTER
Sent pt MYC message to confirm upcoming colonoscopy scheduled for 1/13/25 with  at [facility].

## 2024-12-30 ENCOUNTER — ANESTHESIA EVENT (OUTPATIENT)
Dept: ANESTHESIOLOGY | Facility: HOSPITAL | Age: 69
End: 2024-12-30

## 2024-12-30 ENCOUNTER — ANESTHESIA (OUTPATIENT)
Dept: ANESTHESIOLOGY | Facility: HOSPITAL | Age: 69
End: 2024-12-30

## 2025-01-06 NOTE — TELEPHONE ENCOUNTER
Second attempt, spoke to pt to confirm upcoming colonoscopy scheduled for 1/13/25 with  at [facility]. Pt confirmed and stated she had no further questions at this time.

## 2025-01-07 ENCOUNTER — APPOINTMENT (OUTPATIENT)
Dept: LAB | Facility: MEDICAL CENTER | Age: 70
End: 2025-01-07
Payer: MEDICARE

## 2025-01-07 DIAGNOSIS — D72.829 LEUKOCYTOSIS, UNSPECIFIED TYPE: ICD-10-CM

## 2025-01-07 DIAGNOSIS — D75.839 THROMBOCYTOSIS: ICD-10-CM

## 2025-01-07 LAB
ALBUMIN SERPL BCG-MCNC: 4.1 G/DL (ref 3.5–5)
ALP SERPL-CCNC: 59 U/L (ref 34–104)
ALT SERPL W P-5'-P-CCNC: 14 U/L (ref 7–52)
ANION GAP SERPL CALCULATED.3IONS-SCNC: 7 MMOL/L (ref 4–13)
AST SERPL W P-5'-P-CCNC: 17 U/L (ref 13–39)
BILIRUB SERPL-MCNC: 0.5 MG/DL (ref 0.2–1)
BUN SERPL-MCNC: 14 MG/DL (ref 5–25)
CALCIUM SERPL-MCNC: 9.2 MG/DL (ref 8.4–10.2)
CHLORIDE SERPL-SCNC: 103 MMOL/L (ref 96–108)
CHOLEST SERPL-MCNC: 115 MG/DL (ref ?–200)
CO2 SERPL-SCNC: 29 MMOL/L (ref 21–32)
CREAT SERPL-MCNC: 0.68 MG/DL (ref 0.6–1.3)
GFR SERPL CREATININE-BSD FRML MDRD: 90 ML/MIN/1.73SQ M
GLUCOSE P FAST SERPL-MCNC: 100 MG/DL (ref 65–99)
HDLC SERPL-MCNC: 32 MG/DL
LDLC SERPL CALC-MCNC: 42 MG/DL (ref 0–100)
NONHDLC SERPL-MCNC: 83 MG/DL
POTASSIUM SERPL-SCNC: 4.9 MMOL/L (ref 3.5–5.3)
PROT SERPL-MCNC: 6.6 G/DL (ref 6.4–8.4)
SODIUM SERPL-SCNC: 139 MMOL/L (ref 135–147)
TRIGL SERPL-MCNC: 203 MG/DL (ref ?–150)
TSH SERPL DL<=0.05 MIU/L-ACNC: 3.05 UIU/ML (ref 0.45–4.5)

## 2025-01-07 PROCEDURE — 36415 COLL VENOUS BLD VENIPUNCTURE: CPT

## 2025-01-07 PROCEDURE — 85007 BL SMEAR W/DIFF WBC COUNT: CPT

## 2025-01-07 PROCEDURE — 85027 COMPLETE CBC AUTOMATED: CPT

## 2025-01-08 ENCOUNTER — RESULTS FOLLOW-UP (OUTPATIENT)
Age: 70
End: 2025-01-08

## 2025-01-08 ENCOUNTER — ANNUAL EXAM (OUTPATIENT)
Dept: GYNECOLOGY | Facility: CLINIC | Age: 70
End: 2025-01-08
Payer: MEDICARE

## 2025-01-08 VITALS
DIASTOLIC BLOOD PRESSURE: 72 MMHG | SYSTOLIC BLOOD PRESSURE: 118 MMHG | HEART RATE: 70 BPM | HEIGHT: 63 IN | WEIGHT: 205.6 LBS | BODY MASS INDEX: 36.43 KG/M2

## 2025-01-08 DIAGNOSIS — Z01.419 ENCOUNTER FOR GYNECOLOGICAL EXAMINATION WITHOUT ABNORMAL FINDING: ICD-10-CM

## 2025-01-08 DIAGNOSIS — Z85.3 HISTORY OF LEFT BREAST CANCER: Primary | ICD-10-CM

## 2025-01-08 PROCEDURE — G0101 CA SCREEN;PELVIC/BREAST EXAM: HCPCS | Performed by: OBSTETRICS & GYNECOLOGY

## 2025-01-08 PROCEDURE — G0145 SCR C/V CYTO,THINLAYER,RESCR: HCPCS | Performed by: OBSTETRICS & GYNECOLOGY

## 2025-01-08 PROCEDURE — 85060 BLOOD SMEAR INTERPRETATION: CPT | Performed by: STUDENT IN AN ORGANIZED HEALTH CARE EDUCATION/TRAINING PROGRAM

## 2025-01-08 NOTE — PROGRESS NOTES
Name: Nadia Palacio      : 1955      MRN: [id number]  Encounter Provider: Leighton Arora DO  Encounter Date: 2025   Encounter department: [facility]  :  Assessment & Plan  History of left breast cancer         Encounter for gynecological examination without abnormal finding             History of Present Illness   HPI  Nadia Palacio is a 69 y.o. female who presents for annual examination.  She has a history of left breast cancer diagnosed in  stage Ia.  She is status post left mastectomy.  Patient denies any vaginal irritation, burning, discharge or bleeding.  Denies any dysuria, hematuria urgency or urinary incontinence.  No GI complaints.    Colonoscopy 2021.  Polyps identified.  Advised repeat in 3 years.  She is scheduled for repeat colonoscopy on .    DEXA scan 2024.  Osteopenia      Review of Systems   Constitutional: Negative.    HENT:  Negative for sore throat and trouble swallowing.    Gastrointestinal: Negative.    Genitourinary: Negative.      Past Medical History   Past Medical History:   Diagnosis Date    Allergic     Alopecia totalis     Arthritis     Arthritis of knee     bilateral    Basal cell carcinoma (BCC) of chest     Breast CA (HCC) 2014    Left    Cancer (HCC) 2021    Basal cell carcinoma on chest    Colon polyp     Disease of thyroid gland     Fibroid     Herpes zoster     History of chemotherapy     Hyperlipidemia     Hypertension     Hypothyroidism     Lymphadenopathy, mediastinal     Menopause     Multiple allergies     Murmur, cardiac     Obesity     PHN (postherpetic neuralgia)     PONV (postoperative nausea and vomiting)     Postherpetic neuralgia     Thrombocytopathy (HCC)      Past Surgical History:   Procedure Laterality Date    BASAL CELL CARCINOMA EXCISION  2021    BREAST BIOPSY  2013    BREAST LUMPECTOMY Left 2014    BREAST RECONSTRUCTION Left     COLONOSCOPY      also needed CT of colon     COLONOSCOPY      MASTECTOMY Left 2014    OVARIAN CYST REMOVAL  2002    REPLACEMENT TOTAL KNEE Bilateral     R 01/21/20, L 02/25/20    SENTINEL LYMPH NODE BIOPSY Left 2014    TUBAL LIGATION Bilateral     UPPER GASTROINTESTINAL ENDOSCOPY      US GUIDED BREAST LYMPH NODE BIOPSY RIGHT Right 11/29/2023    WISDOM TOOTH EXTRACTION       Family History   Problem Relation Age of Onset    Heart attack Mother     Stroke Mother         CVA?    Diabetes Father     Hypertension Father     Bone cancer Father 60    Multiple myeloma Father 60    Diabetes Sister     Polycythemia Sister     No Known Problems Sister     No Known Problems Daughter     No Known Problems Daughter     No Known Problems Maternal Grandmother     No Known Problems Maternal Grandfather     No Known Problems Paternal Grandmother     No Known Problems Paternal Grandfather     Breast cancer Maternal Aunt         age dx unknown    No Known Problems Maternal Aunt     No Known Problems Maternal Aunt     No Known Problems Maternal Aunt     No Known Problems Maternal Aunt     No Known Problems Maternal Aunt     No Known Problems Maternal Aunt       reports that she quit smoking about 26 years ago. Her smoking use included cigarettes. She started smoking about 53 years ago. She has a 13.5 pack-year smoking history. She has been exposed to tobacco smoke. She has never used smokeless tobacco. She reports current alcohol use of about 7.0 standard drinks of alcohol per week. She reports that she does not use drugs.  Current Outpatient Medications on File Prior to Visit   Medication Sig Dispense Refill    ascorbic acid (VITAMIN C) 500 MG tablet Take by mouth      aspirin (ECOTRIN LOW STRENGTH) 81 mg EC tablet Take 81 mg by mouth daily      Calcium Carb-Cholecalciferol 1000-800 MG-UNIT TABS Take by mouth      Cetirizine HCl 10 MG CAPS Take by mouth      EPINEPHrine (EPIPEN) 0.3 mg/0.3 mL SOAJ INJECT 0.3 ML (0.3 MG TOTAL) INJECT INTO THE MUSCLE ONE TIME FOR 1 DOSE.       levothyroxine 137 mcg tablet TAKE 1 TABLET BY MOUTH EVERY DAY 90 tablet 1    lisinopril (ZESTRIL) 20 mg tablet TAKE 1 TABLET BY MOUTH EVERY  tablet 1    montelukast (SINGULAIR) 10 mg tablet TAKE 1 TABLET BY MOUTH EVERYDAY AT BEDTIME 100 tablet 1    Multiple Vitamin (MULTI-DAY VITAMINS) TABS Take by mouth      Omega-3 Fatty Acids (Fish Oil) 1200 MG CAPS       Psyllium (METAMUCIL FIBER PO) Take by mouth      Restasis 0.05 % ophthalmic emulsion       rosuvastatin (CRESTOR) 20 MG tablet TAKE 1 TABLET BY MOUTH EVERY  tablet 1    tacrolimus (PROTOPIC) 0.1 % ointment APPLY TO AFFECTED AREAS ONCE TO TWICE DAILY AS NEEDED ON AFFECTED AREAS AROUND THE EYES      VITAMIN E COMPLEX PO Take by mouth       No current facility-administered medications on file prior to visit.     Allergies   Allergen Reactions    Clarithromycin     Clindamycin Abdominal Pain    Fluticasone Swelling    Other     Sulfanilamide     Adhesive [Medical Tape] Rash    Cobalt Rash    Formaldehyde Rash    Gold Sodium Thiomalate Rash    Nickel Rash    Penicillins Rash    Propolis Rash    Sulfa Antibiotics Rash      Current Outpatient Medications on File Prior to Visit   Medication Sig Dispense Refill    ascorbic acid (VITAMIN C) 500 MG tablet Take by mouth      aspirin (ECOTRIN LOW STRENGTH) 81 mg EC tablet Take 81 mg by mouth daily      Calcium Carb-Cholecalciferol 1000-800 MG-UNIT TABS Take by mouth      Cetirizine HCl 10 MG CAPS Take by mouth      EPINEPHrine (EPIPEN) 0.3 mg/0.3 mL SOAJ INJECT 0.3 ML (0.3 MG TOTAL) INJECT INTO THE MUSCLE ONE TIME FOR 1 DOSE.      levothyroxine 137 mcg tablet TAKE 1 TABLET BY MOUTH EVERY DAY 90 tablet 1    lisinopril (ZESTRIL) 20 mg tablet TAKE 1 TABLET BY MOUTH EVERY  tablet 1    montelukast (SINGULAIR) 10 mg tablet TAKE 1 TABLET BY MOUTH EVERYDAY AT BEDTIME 100 tablet 1    Multiple Vitamin (MULTI-DAY VITAMINS) TABS Take by mouth      Omega-3 Fatty Acids (Fish Oil) 1200 MG CAPS       Psyllium (METAMUCIL  "FIBER PO) Take by mouth      Restasis 0.05 % ophthalmic emulsion       rosuvastatin (CRESTOR) 20 MG tablet TAKE 1 TABLET BY MOUTH EVERY  tablet 1    tacrolimus (PROTOPIC) 0.1 % ointment APPLY TO AFFECTED AREAS ONCE TO TWICE DAILY AS NEEDED ON AFFECTED AREAS AROUND THE EYES      VITAMIN E COMPLEX PO Take by mouth       No current facility-administered medications on file prior to visit.      Social History     Tobacco Use    Smoking status: Former     Current packs/day: 0.00     Average packs/day: 0.5 packs/day for 27.0 years (13.5 ttl pk-yrs)     Types: Cigarettes     Start date:      Quit date:      Years since quittin.0     Passive exposure: Past    Smokeless tobacco: Never   Vaping Use    Vaping status: Never Used   Substance and Sexual Activity    Alcohol use: Yes     Alcohol/week: 7.0 standard drinks of alcohol     Types: 7 Glasses of wine per week    Drug use: Never    Sexual activity: Yes     Partners: Male     Birth control/protection: Surgical        Objective   /72 (BP Location: Right arm, Patient Position: Sitting, Cuff Size: Large)   Pulse 70   Ht 5' 3\" (1.6 m)   Wt 93.3 kg (205 lb 9.6 oz)   BMI 36.42 kg/m²      Physical Exam  Vitals reviewed.   Cardiovascular:      Rate and Rhythm: Normal rate and regular rhythm.      Pulses: Normal pulses.      Heart sounds: Normal heart sounds.   Pulmonary:      Effort: Pulmonary effort is normal.      Breath sounds: Normal breath sounds.   Chest:   Breasts:     Right: No swelling, bleeding, inverted nipple, mass, nipple discharge, skin change or tenderness.      Left: Absent. No skin change.   Abdominal:      General: There is no distension.      Palpations: Abdomen is soft. There is no mass.      Tenderness: There is no abdominal tenderness. There is no guarding or rebound.      Hernia: No hernia is present. There is no hernia in the left inguinal area or right inguinal area.   Genitourinary:     General: Normal vulva.      Labia:       "   Right: No rash, tenderness or lesion.         Left: No rash, tenderness or lesion.       Comments: Uterus anteverted normal size and contour freely mobile and nontender.  No palpable adnexal masses or tenderness.  Cervix appears normal.  Vagina with atrophic changes.  Bartholin's and Arrowsmith's glands normal.  Urethral orifice normal.  No cystocele or rectocele.  Musculoskeletal:      Cervical back: Normal range of motion and neck supple. No tenderness.   Lymphadenopathy:      Cervical: No cervical adenopathy.      Upper Body:      Right upper body: No supraclavicular, axillary or pectoral adenopathy.      Left upper body: No supraclavicular, axillary or pectoral adenopathy.      Lower Body: No right inguinal adenopathy. No left inguinal adenopathy.   Neurological:      Mental Status: She is alert.

## 2025-01-09 LAB
ANISOCYTOSIS BLD QL SMEAR: PRESENT
BASOPHILS # BLD MANUAL: 0 THOUSAND/UL (ref 0–0.1)
BASOPHILS NFR MAR MANUAL: 0 % (ref 0–1)
DIFFERENTIAL COMMENT: ABNORMAL
EOSINOPHIL # BLD MANUAL: 0.13 THOUSAND/UL (ref 0–0.4)
EOSINOPHIL NFR BLD MANUAL: 1 % (ref 0–6)
ERYTHROCYTE [DISTWIDTH] IN BLOOD BY AUTOMATED COUNT: 15 % (ref 11.6–15.1)
HCT VFR BLD AUTO: 43.1 % (ref 34.8–46.1)
HGB BLD-MCNC: 14 G/DL (ref 11.5–15.4)
LYMPHOCYTES # BLD AUTO: 46 % (ref 14–44)
LYMPHOCYTES # BLD AUTO: 8.67 THOUSAND/UL (ref 0.6–4.47)
MACROCYTES BLD QL AUTO: PRESENT
MCH RBC QN AUTO: 31.3 PG (ref 26.8–34.3)
MCHC RBC AUTO-ENTMCNC: 32.5 G/DL (ref 31.4–37.4)
MCV RBC AUTO: 96 FL (ref 82–98)
MONOCYTES # BLD AUTO: 0.26 THOUSAND/UL (ref 0–1.22)
MONOCYTES NFR BLD: 2 % (ref 4–12)
NEUTROPHILS # BLD MANUAL: 4.07 THOUSAND/UL (ref 1.85–7.62)
NEUTS BAND NFR BLD MANUAL: 2 % (ref 0–8)
NEUTS SEG NFR BLD AUTO: 29 % (ref 43–75)
NRBC BLD AUTO-RTO: 1 /100 WBC (ref 0–2)
PATHOLOGY REVIEW: YES
PLATELET # BLD AUTO: 526 THOUSANDS/UL (ref 149–390)
PLATELET BLD QL SMEAR: ABNORMAL
PMV BLD AUTO: 9.9 FL (ref 8.9–12.7)
RBC # BLD AUTO: 4.48 MILLION/UL (ref 3.81–5.12)
RBC MORPH BLD: PRESENT
TARGETS BLD QL SMEAR: PRESENT
VARIANT LYMPHS # BLD AUTO: 20 %
WBC # BLD AUTO: 13.13 THOUSAND/UL (ref 4.31–10.16)

## 2025-01-13 ENCOUNTER — HOSPITAL ENCOUNTER (OUTPATIENT)
Dept: GASTROENTEROLOGY | Facility: MEDICAL CENTER | Age: 70
Setting detail: OUTPATIENT SURGERY
Discharge: HOME | End: 2025-01-13
Attending: INTERNAL MEDICINE
Payer: MEDICARE

## 2025-01-13 ENCOUNTER — ANESTHESIA EVENT (OUTPATIENT)
Dept: GASTROENTEROLOGY | Facility: MEDICAL CENTER | Age: 70
End: 2025-01-13
Payer: MEDICARE

## 2025-01-13 ENCOUNTER — ANESTHESIA (OUTPATIENT)
Dept: GASTROENTEROLOGY | Facility: MEDICAL CENTER | Age: 70
End: 2025-01-13
Payer: MEDICARE

## 2025-01-13 VITALS
DIASTOLIC BLOOD PRESSURE: 57 MMHG | RESPIRATION RATE: 18 BRPM | HEIGHT: 63 IN | WEIGHT: 205 LBS | HEART RATE: 67 BPM | SYSTOLIC BLOOD PRESSURE: 118 MMHG | BODY MASS INDEX: 36.32 KG/M2 | TEMPERATURE: 98.4 F | OXYGEN SATURATION: 96 %

## 2025-01-13 DIAGNOSIS — Z86.0100 HX OF COLONIC POLYPS: ICD-10-CM

## 2025-01-13 LAB
LAB AP GYN PRIMARY INTERPRETATION: NORMAL
Lab: NORMAL

## 2025-01-13 RX ORDER — ONDANSETRON 2 MG/ML
4 INJECTION INTRAMUSCULAR; INTRAVENOUS ONCE AS NEEDED
Status: CANCELLED | OUTPATIENT
Start: 2025-01-13

## 2025-01-13 RX ORDER — SODIUM CHLORIDE 9 MG/ML
125 INJECTION, SOLUTION INTRAVENOUS CONTINUOUS
Status: DISCONTINUED | OUTPATIENT
Start: 2025-01-13 | End: 2025-01-17 | Stop reason: HOSPADM

## 2025-01-13 RX ORDER — PROPOFOL 10 MG/ML
INJECTION, EMULSION INTRAVENOUS AS NEEDED
Status: DISCONTINUED | OUTPATIENT
Start: 2025-01-13 | End: 2025-01-13

## 2025-01-13 RX ADMIN — PROPOFOL 130 MG: 10 INJECTION, EMULSION INTRAVENOUS at 08:59

## 2025-01-13 RX ADMIN — PROPOFOL 50 MG: 10 INJECTION, EMULSION INTRAVENOUS at 09:15

## 2025-01-13 RX ADMIN — PROPOFOL 30 MG: 10 INJECTION, EMULSION INTRAVENOUS at 09:11

## 2025-01-13 RX ADMIN — PROPOFOL 50 MG: 10 INJECTION, EMULSION INTRAVENOUS at 09:02

## 2025-01-13 RX ADMIN — SODIUM CHLORIDE 125 ML/HR: 0.9 INJECTION, SOLUTION INTRAVENOUS at 08:49

## 2025-01-13 RX ADMIN — PROPOFOL 50 MG: 10 INJECTION, EMULSION INTRAVENOUS at 09:07

## 2025-01-13 NOTE — H&P
History and Physical - [facility]  Nadia Palacio 69 y.o. female MRN: [id number]                  HPI: Nadia Palacio is a 69 y.o. year old female who presents for crc screening      REVIEW OF SYSTEMS: Per the HPI, and otherwise unremarkable.    Historical Information   Past Medical History:   Diagnosis Date    Allergic     Alopecia totalis     Arthritis     Arthritis of knee     bilateral    Basal cell carcinoma (BCC) of chest     Breast CA (HCC) 2014    Left    Cancer (HCC) 07/2021    Basal cell carcinoma on chest    Colon polyp     Disease of thyroid gland     Fibroid 1999    Herpes zoster     History of chemotherapy 2014    Hyperlipidemia     Hypertension     Hypothyroidism     Lymphadenopathy, mediastinal     Menopause     Multiple allergies     Murmur, cardiac     Obesity     PHN (postherpetic neuralgia)     PONV (postoperative nausea and vomiting)     Postherpetic neuralgia     Thrombocytopathy (HCC)      Past Surgical History:   Procedure Laterality Date    BASAL CELL CARCINOMA EXCISION  08/2021    BREAST BIOPSY  2013    BREAST LUMPECTOMY Left 2014    BREAST RECONSTRUCTION Left     COLONOSCOPY      also needed CT of colon    COLONOSCOPY      MASTECTOMY Left 2014    OVARIAN CYST REMOVAL  2002    REPLACEMENT TOTAL KNEE Bilateral     R 01/21/20, L 02/25/20    SENTINEL LYMPH NODE BIOPSY Left 2014    TUBAL LIGATION Bilateral     UPPER GASTROINTESTINAL ENDOSCOPY      US GUIDED BREAST LYMPH NODE BIOPSY RIGHT Right 11/29/2023    WISDOM TOOTH EXTRACTION       Social History   Social History     Substance and Sexual Activity   Alcohol Use Yes    Alcohol/week: 7.0 standard drinks of alcohol    Types: 7 Glasses of wine per week     Social History     Substance and Sexual Activity   Drug Use Never     Social History     Tobacco Use   Smoking Status Former    Current packs/day: 0.00    Average packs/day: 0.5 packs/day for 27.0 years (13.5 ttl pk-yrs)    Types: Cigarettes    Start date: 1972    Quit date: 1999  "   Years since quittin.0    Passive exposure: Past   Smokeless Tobacco Never     Family History   Problem Relation Age of Onset    Heart attack Mother     Stroke Mother         CVA?    Diabetes Father     Hypertension Father     Bone cancer Father 60    Multiple myeloma Father 60    Diabetes Sister     Polycythemia Sister     No Known Problems Sister     No Known Problems Daughter     No Known Problems Daughter     No Known Problems Maternal Grandmother     No Known Problems Maternal Grandfather     No Known Problems Paternal Grandmother     No Known Problems Paternal Grandfather     Breast cancer Maternal Aunt         age dx unknown    No Known Problems Maternal Aunt     No Known Problems Maternal Aunt     No Known Problems Maternal Aunt     No Known Problems Maternal Aunt     No Known Problems Maternal Aunt     No Known Problems Maternal Aunt        Meds/Allergies       Current Outpatient Medications:     ascorbic acid (VITAMIN C) 500 MG tablet    aspirin (ECOTRIN LOW STRENGTH) 81 mg EC tablet    Calcium Carb-Cholecalciferol 1000-800 MG-UNIT TABS    Cetirizine HCl 10 MG CAPS    levothyroxine 137 mcg tablet    lisinopril (ZESTRIL) 20 mg tablet    montelukast (SINGULAIR) 10 mg tablet    Multiple Vitamin (MULTI-DAY VITAMINS) TABS    Omega-3 Fatty Acids (Fish Oil) 1200 MG CAPS    rosuvastatin (CRESTOR) 20 MG tablet    EPINEPHrine (EPIPEN) 0.3 mg/0.3 mL SOAJ    Psyllium (METAMUCIL FIBER PO)    Restasis 0.05 % ophthalmic emulsion    tacrolimus (PROTOPIC) 0.1 % ointment    VITAMIN E COMPLEX PO    Allergies   Allergen Reactions    Clarithromycin     Clindamycin Abdominal Pain    Fluticasone Swelling    Other     Sulfanilamide     Adhesive [Medical Tape] Rash    Cobalt Rash    Formaldehyde Rash    Gold Sodium Thiomalate Rash    Nickel Rash    Penicillins Rash    Propolis Rash    Sulfa Antibiotics Rash       Objective     /52   Pulse 74   Temp 98.4 °F (36.9 °C) (Temporal)   Resp 14   Ht 5' 3\" (1.6 m)   Wt 93 " kg (205 lb)   SpO2 97%   BMI 36.31 kg/m²       PHYSICAL EXAM    Gen: NAD  Head: NCAT  CV: RRR  CHEST: not in respiratory distress  ABD: soft, NT/ND  EXT: no edema      ASSESSMENT/PLAN:  This is a 69 y.o. year old female here for colonoscopy, and she is stable and optimized for her procedure.

## 2025-01-13 NOTE — ANESTHESIA POSTPROCEDURE EVALUATION
Post-Op Assessment Note    CV Status:  Stable  Pain Score: 0    Pain management: adequate       Mental Status:  Alert   Hydration Status:  Stable   PONV Controlled:  None   Airway Patency:  Patent     Post Op Vitals Reviewed: Yes    No anethesia notable event occurred.    Staff: CRNA       Last Filed PACU Vitals:  Vitals Value Taken Time   Temp     Pulse     BP     Resp     SpO2

## 2025-01-13 NOTE — ANESTHESIA PREPROCEDURE EVALUATION
"\"Patient with hypertension hyperlipidemia hypothyroidism  left breast carcinoma is here to follow-up on chronic medical problems.  Blood pressure is excellent continue current regimen denies any chest pain shortness Palpitation.   She is trying to lose weight and is on export restricted diet as well as walking regularly.  Lab studies are due.     she is followed regularly with GYN as well as hematology oncology.  She agrees lymphedema sleep and has been seeing lymphedema clinic   DEXA scan is scheduled and right breast screening mammogram.  Colonoscopy 2021 was good and patient was advised to follow-up in 3 years.\"    Procedure:  COLONOSCOPY    Relevant Problems   ENDO   (+) Hypothyroidism        Physical Exam    Airway    Mallampati score: II  TM Distance: >3 FB  Neck ROM: full     Dental   No notable dental hx     Cardiovascular  Rhythm: regular, No weak pulses    Pulmonary   No stridor    Other Findings  post-pubertal.      Anesthesia Plan  ASA Score- 2     Anesthesia Type- IV sedation with anesthesia with ASA Monitors.         Additional Monitors:     Airway Plan:            Plan Factors-    Chart reviewed.   Existing labs reviewed. Patient summary reviewed.                  Induction- intravenous.    Postoperative Plan-         Informed Consent- Anesthetic plan and risks discussed with patient.  I personally reviewed this patient with the CRNA. Discussed and agreed on the Anesthesia Plan with the CRNA..        "

## 2025-01-22 DIAGNOSIS — I10 HYPERTENSION, UNSPECIFIED TYPE: ICD-10-CM

## 2025-01-22 DIAGNOSIS — E78.5 HYPERLIPIDEMIA, UNSPECIFIED HYPERLIPIDEMIA TYPE: ICD-10-CM

## 2025-01-22 DIAGNOSIS — H01.00B BLEPHARITIS OF UPPER AND LOWER EYELIDS OF BOTH EYES, UNSPECIFIED TYPE: ICD-10-CM

## 2025-01-22 DIAGNOSIS — H01.00A BLEPHARITIS OF UPPER AND LOWER EYELIDS OF BOTH EYES, UNSPECIFIED TYPE: ICD-10-CM

## 2025-01-22 RX ORDER — MONTELUKAST SODIUM 10 MG/1
10 TABLET ORAL
Qty: 90 TABLET | Refills: 1 | Status: SHIPPED | OUTPATIENT
Start: 2025-01-22

## 2025-01-22 RX ORDER — ROSUVASTATIN CALCIUM 20 MG/1
20 TABLET, COATED ORAL DAILY
Qty: 90 TABLET | Refills: 1 | Status: SHIPPED | OUTPATIENT
Start: 2025-01-22

## 2025-01-22 RX ORDER — LISINOPRIL 20 MG/1
20 TABLET ORAL DAILY
Qty: 90 TABLET | Refills: 1 | Status: SHIPPED | OUTPATIENT
Start: 2025-01-22

## 2025-03-26 ENCOUNTER — ONCOLOGY SURVIVORSHIP (OUTPATIENT)
Dept: SURGICAL ONCOLOGY | Facility: CLINIC | Age: 70
End: 2025-03-26
Payer: MEDICARE

## 2025-03-26 ENCOUNTER — OFFICE VISIT (OUTPATIENT)
Dept: HEMATOLOGY ONCOLOGY | Facility: CLINIC | Age: 70
End: 2025-03-26
Payer: MEDICARE

## 2025-03-26 VITALS
TEMPERATURE: 97.6 F | WEIGHT: 201 LBS | BODY MASS INDEX: 35.61 KG/M2 | OXYGEN SATURATION: 97 % | HEART RATE: 84 BPM | HEIGHT: 63 IN | DIASTOLIC BLOOD PRESSURE: 64 MMHG | RESPIRATION RATE: 18 BRPM | SYSTOLIC BLOOD PRESSURE: 110 MMHG

## 2025-03-26 VITALS
OXYGEN SATURATION: 97 % | HEIGHT: 63 IN | BODY MASS INDEX: 35.61 KG/M2 | WEIGHT: 201 LBS | SYSTOLIC BLOOD PRESSURE: 110 MMHG | HEART RATE: 84 BPM | DIASTOLIC BLOOD PRESSURE: 64 MMHG | TEMPERATURE: 97.6 F

## 2025-03-26 DIAGNOSIS — Z85.3 HISTORY OF LEFT BREAST CANCER: ICD-10-CM

## 2025-03-26 DIAGNOSIS — D72.829 LEUKOCYTOSIS, UNSPECIFIED TYPE: ICD-10-CM

## 2025-03-26 DIAGNOSIS — Z12.31 VISIT FOR SCREENING MAMMOGRAM: ICD-10-CM

## 2025-03-26 DIAGNOSIS — Z17.1 MALIGNANT NEOPLASM OF UPPER-OUTER QUADRANT OF LEFT BREAST IN FEMALE, ESTROGEN RECEPTOR NEGATIVE (HCC): ICD-10-CM

## 2025-03-26 DIAGNOSIS — D75.839 THROMBOCYTOSIS: Primary | ICD-10-CM

## 2025-03-26 DIAGNOSIS — C50.412 MALIGNANT NEOPLASM OF UPPER-OUTER QUADRANT OF LEFT BREAST IN FEMALE, ESTROGEN RECEPTOR NEGATIVE (HCC): ICD-10-CM

## 2025-03-26 DIAGNOSIS — E66.01 OBESITY, MORBID (HCC): ICD-10-CM

## 2025-03-26 DIAGNOSIS — Z08 ENCOUNTER FOR FOLLOW-UP EXAMINATION AFTER COMPLETED TREATMENT FOR MALIGNANT NEOPLASM: Primary | ICD-10-CM

## 2025-03-26 PROCEDURE — 99213 OFFICE O/P EST LOW 20 MIN: CPT | Performed by: NURSE PRACTITIONER

## 2025-03-26 PROCEDURE — 99213 OFFICE O/P EST LOW 20 MIN: CPT | Performed by: PHYSICIAN ASSISTANT

## 2025-03-26 RX ORDER — TRIAMCINOLONE ACETONIDE 1 MG/G
CREAM TOPICAL
COMMUNITY
Start: 2025-03-05

## 2025-03-26 NOTE — PROGRESS NOTES
Name: Nadia Palacio      : 1955      MRN: [id number]  Encounter Provider: Makeda Holbrook PA-C  Encounter Date: 3/26/2025   Encounter department: [facility]  :  Assessment & Plan  Thrombocytosis  Platelet count is stable   F/u 1 year   Orders:  •  CBC and differential; Future    Leukocytosis, unspecified type  WBC stable     Orders:  •  CBC and differential; Future    Malignant neoplasm of upper-outer quadrant of left breast in female, estrogen receptor negative (HCC)  Remote hx  Mammogram is scheduled in Dec 2025        Obesity, morbid (HCC)  Continue with walking as exercising            Return in about 1 year (around 3/26/2026) for virtual or in office, follow up Martir, print lab orders.    History of Present Illness   Chief Complaint   Patient presents with   • Follow-up     Pertinent Medical History     26: presents for follow-up.  She was last seen in 2020. She is being followed due to history of breast cancer.  She had history of stage IA left-sided breast cancer, grade 3, triple negative disease, diagnosed in .  he underwent mastectomy resulting in MITZI followed by adjuvant chemotherapy, AC followed by paclitaxel.  She had remained in observation.  She was last seen by Dr. Ramos in 2020.  He recommended follow-up p.r.n. due to greater than 5 years since diagnosis and to complete imaging with her primary care doctor.       She continues to follow up at this time due to history of elevated WBC and platelet count.      JAK2 negative, flow cytometry of the blood negative     Colonoscopy UTD from Dec 2021.     Routine mammo in 2023 showed an enlarged lymph node in the right axillary area. Biopsy was completed. This showed dermatopathic lymphangitis, negative for lymphoproliferative disorder or malignancy        Review of Systems   Constitutional:  Negative for appetite change, chills, fever and unexpected weight  "change.   HENT:  Negative for mouth sores and nosebleeds.    Respiratory:  Negative for cough and shortness of breath.    Cardiovascular:  Negative for chest pain, palpitations and leg swelling.   Gastrointestinal:  Negative for abdominal pain, blood in stool, constipation, diarrhea, nausea and vomiting.   Genitourinary:  Negative for difficulty urinating, dysuria and hematuria.   Musculoskeletal:  Negative for arthralgias.   Skin: Negative.    Neurological:  Negative for dizziness, weakness, light-headedness, numbness and headaches.   Hematological: Negative.    Psychiatric/Behavioral: Negative.             Objective   /64 (BP Location: Right arm, Patient Position: Sitting, Cuff Size: Large)   Pulse 84   Temp 97.6 °F (36.4 °C) (Temporal)   Resp 18   Ht 5' 3\" (1.6 m)   Wt 91.2 kg (201 lb)   SpO2 97%   BMI 35.61 kg/m²     Physical Exam  Vitals reviewed.   Constitutional:       General: She is not in acute distress.     Appearance: She is well-developed. She is not ill-appearing.   HENT:      Head: Normocephalic and atraumatic.   Eyes:      General: No scleral icterus.     Conjunctiva/sclera: Conjunctivae normal.   Cardiovascular:      Rate and Rhythm: Normal rate and regular rhythm.      Heart sounds: Normal heart sounds. No murmur heard.  Pulmonary:      Effort: Pulmonary effort is normal. No respiratory distress.      Breath sounds: Normal breath sounds.   Abdominal:      Palpations: Abdomen is soft.      Tenderness: There is no abdominal tenderness.   Musculoskeletal:         General: No tenderness. Normal range of motion.      Cervical back: Normal range of motion and neck supple.      Right lower leg: No edema.      Left lower leg: No edema.   Lymphadenopathy:      Cervical: No cervical adenopathy.   Skin:     General: Skin is warm and dry.   Neurological:      Mental Status: She is alert and oriented to person, place, and time.      Cranial Nerves: No cranial nerve deficit.   Psychiatric:         " Mood and Affect: Mood normal.         Behavior: Behavior normal.         Labs: I have reviewed the following labs:  Results for orders placed or performed in visit on 01/08/25   Liquid-based pap, screening   Result Value Ref Range    Case Report       Gynecologic Cytology Report                       Case: [id number]                                  Authorizing Provider:  Leighton Arora DO      Collected:           01/08/2025 1030              Ordering Location:     [facility]        Received:            01/08/2025 1030                                     [facility]                                                    First Screen:          Cassie Borges                                                            Specimen:    LIQUID-BASED PAP, SCREENING, Cervix, Endocervical                                          Primary Interpretation Negative for intraepithelial lesion or malignancy     Specimen Adequacy Satisfactory for evaluation. (See note)     Note       No endocervical cells identified. It is difficult to differentiate between squamous metaplastic cells and parabasal cells in atrophic specimens due to numerous causes including menopause, postpartum changes and progestational agents.        Additional Information       R17's FDA approved ,  and ThinPrep Imaging Duo System are utilized with strict adherence to the 's instruction manual to prepare gynecologic and non-gynecologic cytology specimens for the production of ThinPrep slides as well as for gynecologic ThinPrep imaging. These processes have been validated by our laboratory and/or by the .  The Pap test is not a diagnostic procedure and should not be used as the sole means to detect cervical cancer. It is only a screening procedure to aid in the detection of cervical cancer and its precursors. Both false-negative and false-positive results have been experienced. Your patient's test  result should be interpreted in this context together with the history and clinical findings.      Gross Description       20 ml , colorless, cloudy received in a ThinPrep vial.

## 2025-03-26 NOTE — PROGRESS NOTES
Name: Nadia Palacio      : 1955      MRN: [id number]  Encounter Provider: HENRIK Bowers  Encounter Date: 3/26/2025   Encounter department: [facility] [place]  :  Assessment & Plan  Encounter for follow-up examination after completed treatment for malignant neoplasm         History of left breast cancer         Visit for screening mammogram    Orders:  •  Mammo screening right w 3d and cad; Future    Patient is a 69-year-old female that was diagnosed with a left breast cancer in .  Her pathology revealed invasive ductal carcinoma, triple negative.  She underwent a lumpectomy and sentinel node biopsy which revealed close margins.  She underwent a completion mastectomy and had multiple reconstructive surgeries.  She completed adjuvant chemotherapy and is currently on observation.   She had undergone genetic testing, most recently in  with the [employer] panel which was negative.  Follow-up breast cancer survivorship visit performed today.  She had a right 3d screening mammogram in 2024 which was BIRADS 2, category 2 density.  She offers no new complaints today and there are no worrisome findings on today's clinical exam.  Prescription given for mammogram which will be due in December.  Patient continues to follow with her gynecologist on an annual basis.  She feels comfortable resuming care with her gynecologist for a breast exam as well as annual mammography.  We also discussed the option of meeting with cardio oncology secondary to her previous chemotherapy.  She is not interested at this time.  I will plan to see her back on an as-needed basis.  She knows to contact us with any changes or concerns in the future.  All questions were answered today.      Survivorship  Discussed symptoms related to disease recurrence, Yes     Evaluated for late effects related to cancer treatment, Yes     Screening current for cervical cancer, Yes     Screening  current for colon cancer, Yes     Cancer rehabilitation services addressed, Yes     Screening current for osteoporosis, Yes         History of Present Illness   Nadia Palacio is a 69 y.o. year old female who presents today for follow-up survivorship visit.  She has not appreciated any changes on self-exam.  She had a right-sided mammogram in December 2024 which was BI-RADS 2, category 2 density.  She denies persistent headaches, back pain or bone pain, cough or shortness of breath, abdominal pain.    Oncology History   Cancer Staging   No matching staging information was found for the patient.  Oncology History   History of left breast cancer   11/13/2013 Biopsy    Left breast stereotactic biopsy    Ductal carcinoma in situ  ER 10%  OK negative     12/2013 Biopsy    MRI guided biopsy.     Left breast invasive ductal carcinoma  ER negative  OK negative  HER-2 negative     12/2013 Genetic Testing    Patient has genetic testing done for BRCA1, BRCA2  Results revealed patient has the following mutation(s):  negative     1/16/2014 Surgery    Left lumpectomy, sentinel node biopsy (Dr Chelo Starkey)    pT1c pN0  Close margins     1/24/2014 Surgery    Left mastectomy     No residual malignancy     3/2014 - 6/2014 Chemotherapy    Dose dense Adriamycin and Cytoxan for 4 courses and then dose dense Taxol four courses      8/2014 Surgery    Left breast reconstruction     10/2014 Surgery    Exchange of left breast implant, removal of Alloderm     2/2015 Surgery    Removal left breast implant     5/2015 Surgery    Left latissimus dorsi flap reconstruction     11/2015 Genetic Testing    Patient has genetic testing done for [employer]  APC, BELEM,  BARD1, BMPR1A, BRCA1, BRCA2, BRIP1, CDH1, CDK4, CDKN2A, CHEK2, EPCAM (large rearrangment only),  MLH1, MSH2,  MSH6, MUTYH, NBN, , PALB2, PMS2, PTEN, RAD51C, RAD51D,  SMAD4, STK11, TP53    Results revealed patient has the following mutation(s):  Negative        Review of Systems  "  Constitutional:  Negative for activity change, appetite change, chills, fatigue, fever and unexpected weight change.   Respiratory:  Negative for cough and shortness of breath.    Cardiovascular:  Negative for chest pain.   Gastrointestinal:  Negative for abdominal pain, constipation, diarrhea, nausea and vomiting.   Musculoskeletal:  Negative for arthralgias, back pain, gait problem and myalgias.   Skin:  Negative for color change and rash.   Neurological:  Negative for dizziness and headaches.   Hematological:  Negative for adenopathy.   Psychiatric/Behavioral:  Negative for agitation and confusion.    All other systems reviewed and are negative.   A complete review of systems is negative other than that noted above in the HPI.           Objective   /64 (Patient Position: Sitting, Cuff Size: Large)   Pulse 84   Temp 97.6 °F (36.4 °C) (Temporal)   Ht 5' 3\" (1.6 m)   Wt 91.2 kg (201 lb)   SpO2 97%   BMI 35.61 kg/m²     Pain Screening:  Pain Score: 0-No pain  ECOG    Physical Exam  Vitals reviewed.   Constitutional:       General: She is not in acute distress.     Appearance: Normal appearance. She is well-developed. She is not diaphoretic.   HENT:      Head: Normocephalic and atraumatic.   Cardiovascular:      Rate and Rhythm: Normal rate and regular rhythm.      Heart sounds: Normal heart sounds.   Pulmonary:      Effort: Pulmonary effort is normal.      Breath sounds: Normal breath sounds.   Chest:   Breasts:     Right: Skin change (reduction/lift scars present) present. No swelling, bleeding, inverted nipple, mass, nipple discharge or tenderness.      Comments: Left reconstructed breast ( Latissimus dorsi flap)  Without masses, skin changes or nodules. Left axilla is tight  Stable left arm lymphedema  Abdominal:      Palpations: Abdomen is soft. There is no mass.      Tenderness: There is no abdominal tenderness.   Musculoskeletal:         General: Normal range of motion.      Cervical back: Normal " range of motion.   Lymphadenopathy:      Upper Body:      Right upper body: No supraclavicular adenopathy.      Left upper body: No supraclavicular or axillary adenopathy.   Skin:     General: Skin is warm and dry.      Findings: No rash.   Neurological:      Mental Status: She is alert and oriented to person, place, and time.   Psychiatric:         Speech: Speech normal.

## 2025-04-20 DIAGNOSIS — E03.9 ACQUIRED HYPOTHYROIDISM: ICD-10-CM

## 2025-04-21 DIAGNOSIS — H01.00B BLEPHARITIS OF UPPER AND LOWER EYELIDS OF BOTH EYES, UNSPECIFIED TYPE: ICD-10-CM

## 2025-04-21 DIAGNOSIS — H01.00A BLEPHARITIS OF UPPER AND LOWER EYELIDS OF BOTH EYES, UNSPECIFIED TYPE: ICD-10-CM

## 2025-04-22 RX ORDER — LEVOTHYROXINE SODIUM 137 UG/1
137 TABLET ORAL DAILY
Qty: 90 TABLET | Refills: 1 | Status: SHIPPED | OUTPATIENT
Start: 2025-04-22

## 2025-04-23 ENCOUNTER — RA CDI HCC (OUTPATIENT)
Dept: OTHER | Facility: HOSPITAL | Age: 70
End: 2025-04-23

## 2025-04-23 RX ORDER — MONTELUKAST SODIUM 10 MG/1
10 TABLET ORAL
Qty: 90 TABLET | Refills: 1 | Status: SHIPPED | OUTPATIENT
Start: 2025-04-23

## 2025-04-29 ENCOUNTER — OFFICE VISIT (OUTPATIENT)
Age: 70
End: 2025-04-29
Payer: MEDICARE

## 2025-04-29 VITALS
WEIGHT: 202 LBS | RESPIRATION RATE: 16 BRPM | OXYGEN SATURATION: 98 % | TEMPERATURE: 97.8 F | DIASTOLIC BLOOD PRESSURE: 80 MMHG | SYSTOLIC BLOOD PRESSURE: 128 MMHG | HEIGHT: 62 IN | BODY MASS INDEX: 37.17 KG/M2 | HEART RATE: 61 BPM

## 2025-04-29 DIAGNOSIS — R73.9 HYPERGLYCEMIA: ICD-10-CM

## 2025-04-29 DIAGNOSIS — E78.5 HYPERLIPIDEMIA, UNSPECIFIED HYPERLIPIDEMIA TYPE: ICD-10-CM

## 2025-04-29 DIAGNOSIS — E03.9 ACQUIRED HYPOTHYROIDISM: ICD-10-CM

## 2025-04-29 DIAGNOSIS — I10 HYPERTENSION, UNSPECIFIED TYPE: Primary | ICD-10-CM

## 2025-04-29 DIAGNOSIS — Z00.00 MEDICARE ANNUAL WELLNESS VISIT, SUBSEQUENT: ICD-10-CM

## 2025-04-29 PROCEDURE — 99214 OFFICE O/P EST MOD 30 MIN: CPT | Performed by: INTERNAL MEDICINE

## 2025-04-29 PROCEDURE — G0439 PPPS, SUBSEQ VISIT: HCPCS | Performed by: INTERNAL MEDICINE

## 2025-04-29 PROCEDURE — G2211 COMPLEX E/M VISIT ADD ON: HCPCS | Performed by: INTERNAL MEDICINE

## 2025-04-29 NOTE — PATIENT INSTRUCTIONS
Medicare Preventive Visit Patient Instructions  Thank you for completing your Welcome to Medicare Visit or Medicare Annual Wellness Visit today. Your next wellness visit will be due in one year (4/30/2026).  The screening/preventive services that you may require over the next 5-10 years are detailed below. Some tests may not apply to you based off risk factors and/or age. Screening tests ordered at today's visit but not completed yet may show as past due. Also, please note that scanned in results may not display below.  Preventive Screenings:  Service Recommendations Previous Testing/Comments   Colorectal Cancer Screening  * Colonoscopy    * Fecal Occult Blood Test (FOBT)/Fecal Immunochemical Test (FIT)  * Fecal DNA/Cologuard Test  * Flexible Sigmoidoscopy Age: 45-75 years old   Colonoscopy: every 10 years (may be performed more frequently if at higher risk)  OR  FOBT/FIT: every 1 year  OR  Cologuard: every 3 years  OR  Sigmoidoscopy: every 5 years  Screening may be recommended earlier than age 45 if at higher risk for colorectal cancer. Also, an individualized decision between you and your healthcare provider will decide whether screening between the ages of 76-85 would be appropriate. Colonoscopy: 01/13/2025  FOBT/FIT: Not on file  Cologuard: Not on file  Sigmoidoscopy: Not on file          Breast Cancer Screening Age: 40+ years old  Frequency: every 1-2 years  Not required if history of left and right mastectomy Mammogram: 12/02/2024        Cervical Cancer Screening Between the ages of 21-29, pap smear recommended once every 3 years.   Between the ages of 30-65, can perform pap smear with HPV co-testing every 5 years.   Recommendations may differ for women with a history of total hysterectomy, cervical cancer, or abnormal pap smears in past. Pap Smear: 01/08/2025        Hepatitis C Screening Once for adults born between 1945 and 1965  More frequently in patients at high risk for Hepatitis C Hep C Antibody:  11/01/2019        Diabetes Screening 1-2 times per year if you're at risk for diabetes or have pre-diabetes Fasting glucose: 100 mg/dL (1/7/2025)  A1C: No results in last 5 years (No results in last 5 years)      Cholesterol Screening Once every 5 years if you don't have a lipid disorder. May order more often based on risk factors. Lipid panel: 01/07/2025          Other Preventive Screenings Covered by Medicare:  Abdominal Aortic Aneurysm (AAA) Screening: covered once if your at risk. You're considered to be at risk if you have a family history of AAA.  Lung Cancer Screening: covers low dose CT scan once per year if you meet all of the following conditions: (1) Age 55-77; (2) No signs or symptoms of lung cancer; (3) Current smoker or have quit smoking within the last 15 years; (4) You have a tobacco smoking history of at least 20 pack years (packs per day multiplied by number of years you smoked); (5) You get a written order from a healthcare provider.  Glaucoma Screening: covered annually if you're considered high risk: (1) You have diabetes OR (2) Family history of glaucoma OR (3)  aged 50 and older OR (4)  American aged 65 and older  Osteoporosis Screening: covered every 2 years if you meet one of the following conditions: (1) You're estrogen deficient and at risk for osteoporosis based off medical history and other findings; (2) Have a vertebral abnormality; (3) On glucocorticoid therapy for more than 3 months; (4) Have primary hyperparathyroidism; (5) On osteoporosis medications and need to assess response to drug therapy.   Last bone density test (DXA Scan): 06/28/2024.  HIV Screening: covered annually if you're between the age of 15-65. Also covered annually if you are younger than 15 and older than 65 with risk factors for HIV infection. For pregnant patients, it is covered up to 3 times per pregnancy.    Immunizations:  Immunization Recommendations   Influenza Vaccine Annual  influenza vaccination during flu season is recommended for all persons aged >= 6 months who do not have contraindications   Pneumococcal Vaccine   * Pneumococcal conjugate vaccine = PCV13 (Prevnar 13), PCV15 (Vaxneuvance), PCV20 (Prevnar 20)  * Pneumococcal polysaccharide vaccine = PPSV23 (Pneumovax) Adults 19-65 yo with certain risk factors or if 65+ yo  If never received any pneumonia vaccine: recommend Prevnar 20 (PCV20)  Give PCV20 if previously received 1 dose of PCV13 or PPSV23   Hepatitis B Vaccine 3 dose series if at intermediate or high risk (ex: diabetes, end stage renal disease, liver disease)   Respiratory syncytial virus (RSV) Vaccine - COVERED BY MEDICARE PART D  * RSVPreF3 (Arexvy) CDC recommends that adults 60 years of age and older may receive a single dose of RSV vaccine using shared clinical decision-making (SCDM)   Tetanus (Td) Vaccine - COST NOT COVERED BY MEDICARE PART B Following completion of primary series, a booster dose should be given every 10 years to maintain immunity against tetanus. Td may also be given as tetanus wound prophylaxis.   Tdap Vaccine - COST NOT COVERED BY MEDICARE PART B Recommended at least once for all adults. For pregnant patients, recommended with each pregnancy.   Shingles Vaccine (Shingrix) - COST NOT COVERED BY MEDICARE PART B  2 shot series recommended in those 19 years and older who have or will have weakened immune systems or those 50 years and older     Health Maintenance Due:      Topic Date Due   • Breast Cancer Screening: Mammogram  12/02/2026   • Colorectal Cancer Screening  01/11/2035   • Hepatitis C Screening  Completed     Immunizations Due:      Topic Date Due   • COVID-19 Vaccine (12 - 2024-25 season) 04/28/2025     Advance Directives   What are advance directives?  Advance directives are legal documents that state your wishes and plans for medical care. These plans are made ahead of time in case you lose your ability to make decisions for yourself.  Advance directives can apply to any medical decision, such as the treatments you want, and if you want to donate organs.   What are the types of advance directives?  There are many types of advance directives, and each state has rules about how to use them. You may choose a combination of any of the following:  Living will:  This is a written record of the treatment you want. You can also choose which treatments you do not want, which to limit, and which to stop at a certain time. This includes surgery, medicine, IV fluid, and tube feedings.   Durable power of  for healthcare (DPAHC):  This is a written record that states who you want to make healthcare choices for you when you are unable to make them for yourself. This person, called a proxy, is usually a family member or a friend. You may choose more than 1 proxy.  Do not resuscitate (DNR) order:  A DNR order is used in case your heart stops beating or you stop breathing. It is a request not to have certain forms of treatment, such as CPR. A DNR order may be included in other types of advance directives.  Medical directive:  This covers the care that you want if you are in a coma, near death, or unable to make decisions for yourself. You can list the treatments you want for each condition. Treatment may include pain medicine, surgery, blood transfusions, dialysis, IV or tube feedings, and a ventilator (breathing machine).  Values history:  This document has questions about your views, beliefs, and how you feel and think about life. This information can help others choose the care that you would choose.  Why are advance directives important?  An advance directive helps you control your care. Although spoken wishes may be used, it is better to have your wishes written down. Spoken wishes can be misunderstood, or not followed. Treatments may be given even if you do not want them. An advance directive may make it easier for your family to make difficult  choices about your care.   Weight Management   Why it is important to manage your weight:  Being overweight increases your risk of health conditions such as heart disease, high blood pressure, type 2 diabetes, and certain types of cancer. It can also increase your risk for osteoarthritis, sleep apnea, and other respiratory problems. Aim for a slow, steady weight loss. Even a small amount of weight loss can lower your risk of health problems.  How to lose weight safely:  A safe and healthy way to lose weight is to eat fewer calories and get regular exercise. You can lose up about 1 pound a week by decreasing the number of calories you eat by 500 calories each day.   Healthy meal plan for weight management:  A healthy meal plan includes a variety of foods, contains fewer calories, and helps you stay healthy. A healthy meal plan includes the following:  Eat whole-grain foods more often.  A healthy meal plan should contain fiber. Fiber is the part of grains, fruits, and vegetables that is not broken down by your body. Whole-grain foods are healthy and provide extra fiber in your diet. Some examples of whole-grain foods are whole-wheat breads and pastas, oatmeal, brown rice, and bulgur.  Eat a variety of vegetables every day.  Include dark, leafy greens such as spinach, kale, luanne greens, and mustard greens. Eat yellow and orange vegetables such as carrots, sweet potatoes, and winter squash.   Eat a variety of fruits every day.  Choose fresh or canned fruit (canned in its own juice or light syrup) instead of juice. Fruit juice has very little or no fiber.  Eat low-fat dairy foods.  Drink fat-free (skim) milk or 1% milk. Eat fat-free yogurt and low-fat cottage cheese. Try low-fat cheeses such as mozzarella and other reduced-fat cheeses.  Choose meat and other protein foods that are low in fat.  Choose beans or other legumes such as split peas or lentils. Choose fish, skinless poultry (chicken or turkey), or lean cuts  of red meat (beef or pork). Before you cook meat or poultry, cut off any visible fat.   Use less fat and oil.  Try baking foods instead of frying them. Add less fat, such as margarine, sour cream, regular salad dressing and mayonnaise to foods. Eat fewer high-fat foods. Some examples of high-fat foods include french fries, doughnuts, ice cream, and cakes.  Eat fewer sweets.  Limit foods and drinks that are high in sugar. This includes candy, cookies, regular soda, and sweetened drinks.  Exercise:  Exercise at least 30 minutes per day on most days of the week. Some examples of exercise include walking, biking, dancing, and swimming. You can also fit in more physical activity by taking the stairs instead of the elevator or parking farther away from stores. Ask your healthcare provider about the best exercise plan for you.      © Copyright [employer] 2018 Information is for End User's use only and may not be sold, redistributed or otherwise used for commercial purposes. All illustrations and images included in CareNotes® are the copyrighted property of [employer] or [employer]

## 2025-04-29 NOTE — PROGRESS NOTES
Name: Nadia Palacio      : 1955      MRN: [id number]  Encounter Provider: Li Shultz MD  Encounter Date: 2025   Encounter department: [facility]  :  Assessment & Plan  Hypertension, unspecified type  Well-controlled continue current regimen       Acquired hypothyroidism  Continue current regimen lab studies are due  Orders:    TSH, 3rd generation with Free T4 reflex    Hyperlipidemia, unspecified hyperlipidemia type    Orders:    Comprehensive metabolic panel    Lipid Panel with Direct LDL reflex; Future    Hyperglycemia    Orders:    Hemoglobin A1C; Future    Medicare annual wellness visit, subsequent            Preventive health issues were discussed with patient, and age appropriate screening tests were ordered as noted in patient's After Visit Summary. Personalized health advice and appropriate referrals for health education or preventive services given if needed, as noted in patient's After Visit Summary.  Patient with a history of  History of Present Illness     HPI   Patient with hypertension hyperlipidemia hypothyroidism history of left breast cancer left arm lymphedema is here to follow-up on chronic medical problems  Blood pressure is good.  Patient denies any chest pain shortness of breath lightheadedness palpitation .last LDL continue was excellent.  Statin.  Last TSH was at target.  Continue current regimen glucose level was not elevated diet modification suggested.  DEXA scan showed osteopenia continue with weightbearing exercise.  Mammogram up-to-date.  Colonoscopy up-to-date vaccinations up-to-date    Patient Care Team:  Li Shultz MD as PCP - General (Internal Medicine)  DO Escobar Vera DO Caramarie Guilfoyle, MD    Review of Systems  Medical History Reviewed by provider this encounter:       Annual Wellness Visit Questionnaire   Nadia is here for her Subsequent Wellness visit.     Health Risk Assessment:   Patient rates overall health as  good. Patient feels that their physical health rating is same. Patient is very satisfied with their life. Eyesight was rated as same. Hearing was rated as same. Patient feels that their emotional and mental health rating is same. Patients states they are never, rarely angry. Patient states they are never, rarely unusually tired/fatigued. Pain experienced in the last 7 days has been none. Patient states that she has experienced no weight loss or gain in last 6 months.     Depression Screening:   PHQ-2 Score: 0      Fall Risk Screening:   In the past year, patient has experienced: no history of falling in past year      Urinary Incontinence Screening:   Patient has not leaked urine accidently in the last six months.     Home Safety:  Patient does not have trouble with stairs inside or outside of their home. Patient has working smoke alarms and has working carbon monoxide detector. Home safety hazards include: none.     Nutrition:   Current diet is Regular and Limited junk food.     Medications:   Patient is currently taking over-the-counter supplements. OTC medications include: see medication list. Patient is able to manage medications.     Activities of Daily Living (ADLs)/Instrumental Activities of Daily Living (IADLs):   Walk and transfer into and out of bed and chair?: Yes  Dress and groom yourself?: Yes    Bathe or shower yourself?: Yes    Feed yourself? Yes  Do your laundry/housekeeping?: Yes  Manage your money, pay your bills and track your expenses?: Yes  Make your own meals?: Yes    Do your own shopping?: Yes    Previous Hospitalizations:   Any hospitalizations or ED visits within the last 12 months?: No      Advance Care Planning:   Living will: Yes    Durable POA for healthcare: Yes    Advanced directive: Yes      Cognitive Screening:   Provider or family/friend/caregiver concerned regarding cognition?: No    Preventive Screenings      Cardiovascular Screening:    General: History Lipid Disorder and  Screening Current      Diabetes Screening:     General: Screening Current      Colorectal Cancer Screening:     General: Screening Current      Breast Cancer Screening:     General: History Breast Cancer      Cervical Cancer Screening:    General: Screening Not Indicated      Osteoporosis Screening:    General: Screening Current      Lung Cancer Screening:     General: Screening Not Indicated      Hepatitis C Screening:    General: Screening Current    Screening, Brief Intervention, and Referral to Treatment (SBIRT)     Screening  Typical number of drinks in a day: 3  Typical number of drinks in a week: 1  Interpretation: Low risk drinking behavior.    AUDIT-C Screenin) How often did you have a drink containing alcohol in the past year? 2 to 3 times a week  2) How many drinks did you have on a typical day when you were drinking in the past year? 1 to 2  3) How often did you have 6 or more drinks on one occasion in the past year? never    AUDIT-C Score: 3  Interpretation: Score 3-12 (female): POSITIVE screen for alcohol misuse    AUDIT Screenin) How often during the last year have you found that you were not able to stop drinking once you had started? 0 - never  5) How often during the last year have you failed to do what was normally expected from you because of drinking? 0 - never  6) How often during the last year have you needed a first drink in the morning to get yourself going after a heavy drinking session? 0 - never  7) How often during the last year have you had a feeling of guilt or remorse after drinking? 0 - never  8) How often during the last year have you been unable to remember what happened the night before because you had been drinking? 0 - never  9) Have you or someone else been injured as a result of your drinking? 0 - no  10) Has a relative or friend or a doctor or another health worker been concerned about your drinking or suggested you cut down? 0 - no    AUDIT Score:  "3  Interpretation: Low risk alcohol consumption    Single Item Drug Screening:  How often have you used an illegal drug (including marijuana) or a prescription medication for non-medical reasons in the past year? never    Single Item Drug Screen Score: 0  Interpretation: Negative screen for possible drug use disorder    Social Drivers of Health     Financial Resource Strain: Low Risk  (2/14/2023)    Overall Financial Resource Strain (CARDIA)     Difficulty of Paying Living Expenses: Not hard at all   Food Insecurity: No Food Insecurity (4/25/2025)    Hunger Vital Sign     Worried About Running Out of Food in the Last Year: Never true     Ran Out of Food in the Last Year: Never true   Transportation Needs: No Transportation Needs (4/25/2025)    PRAPARE - Transportation     Lack of Transportation (Medical): No     Lack of Transportation (Non-Medical): No   Housing Stability: Low Risk  (4/25/2025)    Housing Stability Vital Sign     Unable to Pay for Housing in the Last Year: No     Number of Times Moved in the Last Year: 0     Homeless in the Last Year: No   Utilities: Not At Risk (4/25/2025)    [facility] Utilities     Threatened with loss of utilities: No     No results found.    Objective   Resp 16   Ht 5' 2\" (1.575 m)   Wt 91.6 kg (202 lb)   SpO2 98%   BMI 36.95 kg/m²     Physical Exam  Neck:      Vascular: No carotid bruit.   Cardiovascular:      Rate and Rhythm: Normal rate and regular rhythm.      Heart sounds: Normal heart sounds. No murmur heard.  Pulmonary:      Effort: No respiratory distress.      Breath sounds: Normal breath sounds. No wheezing or rales.   Abdominal:      General: Bowel sounds are normal. There is no distension.      Tenderness: There is no abdominal tenderness. There is no guarding.   Musculoskeletal:      Right lower leg: No edema.      Left lower leg: No edema.   Lymphadenopathy:      Cervical: No cervical adenopathy.   Neurological:      Mental Status: She is alert.   Psychiatric:        "  Mood and Affect: Mood normal.         Behavior: Behavior normal.

## 2025-04-29 NOTE — ASSESSMENT & PLAN NOTE
Continue current regimen lab studies are due  Orders:    TSH, 3rd generation with Free T4 reflex

## 2025-07-21 DIAGNOSIS — E78.5 HYPERLIPIDEMIA, UNSPECIFIED HYPERLIPIDEMIA TYPE: ICD-10-CM

## 2025-07-21 DIAGNOSIS — I10 HYPERTENSION, UNSPECIFIED TYPE: ICD-10-CM

## 2025-07-21 RX ORDER — ROSUVASTATIN CALCIUM 20 MG/1
20 TABLET, COATED ORAL DAILY
Qty: 90 TABLET | Refills: 1 | Status: SHIPPED | OUTPATIENT
Start: 2025-07-21

## 2025-07-21 RX ORDER — LISINOPRIL 20 MG/1
20 TABLET ORAL DAILY
Qty: 90 TABLET | Refills: 1 | Status: SHIPPED | OUTPATIENT
Start: 2025-07-21

## 2025-08-18 DIAGNOSIS — E03.9 ACQUIRED HYPOTHYROIDISM: ICD-10-CM

## 2025-08-19 RX ORDER — LEVOTHYROXINE SODIUM 137 UG/1
137 TABLET ORAL DAILY
Qty: 90 TABLET | Refills: 1 | Status: SHIPPED | OUTPATIENT
Start: 2025-08-19